# Patient Record
Sex: FEMALE | Race: WHITE | NOT HISPANIC OR LATINO | Employment: OTHER | URBAN - METROPOLITAN AREA
[De-identification: names, ages, dates, MRNs, and addresses within clinical notes are randomized per-mention and may not be internally consistent; named-entity substitution may affect disease eponyms.]

---

## 2023-11-07 ENCOUNTER — APPOINTMENT (EMERGENCY)
Dept: CT IMAGING | Facility: HOSPITAL | Age: 68
End: 2023-11-07
Payer: COMMERCIAL

## 2023-11-07 ENCOUNTER — APPOINTMENT (EMERGENCY)
Dept: RADIOLOGY | Facility: HOSPITAL | Age: 68
End: 2023-11-07
Payer: COMMERCIAL

## 2023-11-07 ENCOUNTER — HOSPITAL ENCOUNTER (EMERGENCY)
Facility: HOSPITAL | Age: 68
Discharge: HOME | End: 2023-11-07
Attending: EMERGENCY MEDICINE
Payer: COMMERCIAL

## 2023-11-07 VITALS
DIASTOLIC BLOOD PRESSURE: 71 MMHG | OXYGEN SATURATION: 93 % | HEIGHT: 63 IN | BODY MASS INDEX: 19.14 KG/M2 | WEIGHT: 108 LBS | SYSTOLIC BLOOD PRESSURE: 119 MMHG | HEART RATE: 77 BPM | RESPIRATION RATE: 20 BRPM | TEMPERATURE: 98 F

## 2023-11-07 DIAGNOSIS — R10.9 ABDOMINAL PAIN: Primary | ICD-10-CM

## 2023-11-07 LAB
ALBUMIN SERPL BCP-MCNC: 3.7 G/DL (ref 3.5–5)
ALP SERPL-CCNC: 85 U/L (ref 34–104)
ALT SERPL W P-5'-P-CCNC: <3 U/L (ref 7–52)
ANION GAP SERPL CALCULATED.3IONS-SCNC: 11 MMOL/L
AST SERPL W P-5'-P-CCNC: 14 U/L (ref 13–39)
BASOPHILS # BLD AUTO: 0.05 THOUSANDS/ÂΜL (ref 0–0.1)
BASOPHILS NFR BLD AUTO: 1 % (ref 0–1)
BILIRUB SERPL-MCNC: 0.72 MG/DL (ref 0.2–1)
BUN SERPL-MCNC: 8 MG/DL (ref 5–25)
CALCIUM SERPL-MCNC: 9.7 MG/DL (ref 8.4–10.2)
CHLORIDE SERPL-SCNC: 101 MMOL/L (ref 96–108)
CO2 SERPL-SCNC: 26 MMOL/L (ref 21–32)
CREAT SERPL-MCNC: 0.64 MG/DL (ref 0.6–1.3)
EOSINOPHIL # BLD AUTO: 0.33 THOUSAND/ÂΜL (ref 0–0.61)
EOSINOPHIL NFR BLD AUTO: 4 % (ref 0–6)
ERYTHROCYTE [DISTWIDTH] IN BLOOD BY AUTOMATED COUNT: 13.2 % (ref 11.6–15.1)
GFR SERPL CREATININE-BSD FRML MDRD: 91 ML/MIN/1.73SQ M
GLUCOSE SERPL-MCNC: 92 MG/DL (ref 65–140)
HCT VFR BLD AUTO: 42.2 % (ref 34.8–46.1)
HGB BLD-MCNC: 13 G/DL (ref 11.5–15.4)
IMM GRANULOCYTES # BLD AUTO: 0.03 THOUSAND/UL (ref 0–0.2)
IMM GRANULOCYTES NFR BLD AUTO: 0 % (ref 0–2)
LIPASE SERPL-CCNC: 7 U/L (ref 11–82)
LYMPHOCYTES # BLD AUTO: 1.23 THOUSANDS/ÂΜL (ref 0.6–4.47)
LYMPHOCYTES NFR BLD AUTO: 14 % (ref 14–44)
MCH RBC QN AUTO: 29.7 PG (ref 26.8–34.3)
MCHC RBC AUTO-ENTMCNC: 30.8 G/DL (ref 31.4–37.4)
MCV RBC AUTO: 96 FL (ref 82–98)
MONOCYTES # BLD AUTO: 0.6 THOUSAND/ÂΜL (ref 0.17–1.22)
MONOCYTES NFR BLD AUTO: 7 % (ref 4–12)
NEUTROPHILS # BLD AUTO: 6.83 THOUSANDS/ÂΜL (ref 1.85–7.62)
NEUTS SEG NFR BLD AUTO: 74 % (ref 43–75)
NRBC BLD AUTO-RTO: 0 /100 WBCS
PLATELET # BLD AUTO: 322 THOUSANDS/UL (ref 149–390)
PMV BLD AUTO: 11.1 FL (ref 8.9–12.7)
POTASSIUM SERPL-SCNC: 4.7 MMOL/L (ref 3.5–5.3)
PROT SERPL-MCNC: 7.8 G/DL (ref 6.4–8.4)
RBC # BLD AUTO: 4.38 MILLION/UL (ref 3.81–5.12)
SODIUM SERPL-SCNC: 138 MMOL/L (ref 135–147)
WBC # BLD AUTO: 9.07 THOUSAND/UL (ref 4.31–10.16)

## 2023-11-07 PROCEDURE — 71046 X-RAY EXAM CHEST 2 VIEWS: CPT

## 2023-11-07 PROCEDURE — 99284 EMERGENCY DEPT VISIT MOD MDM: CPT

## 2023-11-07 PROCEDURE — 96374 THER/PROPH/DIAG INJ IV PUSH: CPT

## 2023-11-07 PROCEDURE — 93005 ELECTROCARDIOGRAM TRACING: CPT

## 2023-11-07 PROCEDURE — 74177 CT ABD & PELVIS W/CONTRAST: CPT

## 2023-11-07 PROCEDURE — G1004 CDSM NDSC: HCPCS

## 2023-11-07 PROCEDURE — 83690 ASSAY OF LIPASE: CPT | Performed by: EMERGENCY MEDICINE

## 2023-11-07 PROCEDURE — 36415 COLL VENOUS BLD VENIPUNCTURE: CPT | Performed by: EMERGENCY MEDICINE

## 2023-11-07 PROCEDURE — 99285 EMERGENCY DEPT VISIT HI MDM: CPT | Performed by: EMERGENCY MEDICINE

## 2023-11-07 PROCEDURE — 85025 COMPLETE CBC W/AUTO DIFF WBC: CPT | Performed by: EMERGENCY MEDICINE

## 2023-11-07 PROCEDURE — 80053 COMPREHEN METABOLIC PANEL: CPT | Performed by: EMERGENCY MEDICINE

## 2023-11-07 RX ORDER — ONDANSETRON 2 MG/ML
4 INJECTION INTRAMUSCULAR; INTRAVENOUS ONCE
Status: COMPLETED | OUTPATIENT
Start: 2023-11-07 | End: 2023-11-07

## 2023-11-07 RX ORDER — ONDANSETRON 4 MG/1
4 TABLET, ORALLY DISINTEGRATING ORAL EVERY 6 HOURS PRN
Qty: 20 TABLET | Refills: 0 | Status: ON HOLD | OUTPATIENT
Start: 2023-11-07

## 2023-11-07 RX ORDER — DICYCLOMINE HCL 20 MG
20 TABLET ORAL ONCE
Status: COMPLETED | OUTPATIENT
Start: 2023-11-07 | End: 2023-11-07

## 2023-11-07 RX ORDER — ALBUTEROL SULFATE 90 UG/1
2 AEROSOL, METERED RESPIRATORY (INHALATION) ONCE
Status: COMPLETED | OUTPATIENT
Start: 2023-11-07 | End: 2023-11-07

## 2023-11-07 RX ORDER — DICYCLOMINE HCL 20 MG
20 TABLET ORAL 2 TIMES DAILY
Qty: 20 TABLET | Refills: 0 | Status: ON HOLD | OUTPATIENT
Start: 2023-11-07

## 2023-11-07 RX ADMIN — ONDANSETRON 4 MG: 2 INJECTION INTRAMUSCULAR; INTRAVENOUS at 21:13

## 2023-11-07 RX ADMIN — ALBUTEROL SULFATE 2 PUFF: 90 AEROSOL, METERED RESPIRATORY (INHALATION) at 23:08

## 2023-11-07 RX ADMIN — IOHEXOL 100 ML: 350 INJECTION, SOLUTION INTRAVENOUS at 22:20

## 2023-11-07 RX ADMIN — DICYCLOMINE HYDROCHLORIDE 20 MG: 20 TABLET ORAL at 20:11

## 2023-11-08 LAB
ATRIAL RATE: 94 BPM
P AXIS: 75 DEGREES
PR INTERVAL: 134 MS
QRS AXIS: 82 DEGREES
QRSD INTERVAL: 66 MS
QT INTERVAL: 360 MS
QTC INTERVAL: 450 MS
T WAVE AXIS: 52 DEGREES
VENTRICULAR RATE: 94 BPM

## 2023-11-08 PROCEDURE — 93010 ELECTROCARDIOGRAM REPORT: CPT | Performed by: INTERNAL MEDICINE

## 2023-11-08 NOTE — RESULT ENCOUNTER NOTE
1st Attempt to contact patient regarding CXR results. Mobile number was disconnected. No answer on home number. Left generic message to return call to ED to discuss results.  H: [phone]

## 2023-11-08 NOTE — ED PROVIDER NOTES
History  Chief Complaint   Patient presents with   • Abdominal Pain     Generalized abdominal pain with continued nausea and vomiting for 4 weeks. Patient reports  continuous diarrhea and being unable to keep any food down and only being able to drink water. Patient denies any shortness of breath or chest pain. Patient is 17-year-old female past medical history of rheumatoid arthritis presenting with abdominal pain. Patient notes constant generalized abdominal pain for the last month associated with nausea but no vomiting. Notes diarrhea 2 episodes today, nonbloody and states that the pain is mildly better with bowel movements. Has not discussed with patient be, and has not taken medication for it. States that she cannot tolerate p.o. intake, only water. Denies any chest pain, shortness of breath, dizziness, urinary symptoms, fevers, rashes, vision changes. None       No past medical history on file. No past surgical history on file. No family history on file. I have reviewed and agree with the history as documented. E-Cigarette/Vaping   • E-Cigarette Use Never User      E-Cigarette/Vaping Substances     Social History     Tobacco Use   • Smoking status: Every Day     Packs/day: 0.25     Types: Cigarettes   • Smokeless tobacco: Never   Vaping Use   • Vaping Use: Never used   Substance Use Topics   • Alcohol use: Never   • Drug use: Never       Review of Systems   All other systems reviewed and are negative. Physical Exam  Physical Exam  Vitals reviewed. Constitutional:       General: She is not in acute distress. Appearance: Normal appearance. She is not ill-appearing. HENT:      Mouth/Throat:      Mouth: Mucous membranes are moist.   Eyes:      Conjunctiva/sclera: Conjunctivae normal.      Comments: Normal conjunctiva   Cardiovascular:      Rate and Rhythm: Normal rate and regular rhythm. Heart sounds: Normal heart sounds.       Comments: Equal bilateral radial pulses  Pulmonary:      Effort: Pulmonary effort is normal.      Comments: , No signs of respiratory distress, excess or muscle use, retractions or conversational dyspnea, small expiratory wheezing at the bases and apices bilaterally  Abdominal:      General: Abdomen is flat. Palpations: Abdomen is soft. Tenderness: There is generalized abdominal tenderness. There is guarding. There is no right CVA tenderness or left CVA tenderness. Comments: Generalized tenderness with voluntary guarding in all quadrants   Musculoskeletal:         General: No swelling. Normal range of motion. Cervical back: Neck supple. Skin:     General: Skin is warm and dry. Neurological:      General: No focal deficit present. Mental Status: She is alert.    Psychiatric:         Mood and Affect: Mood normal.         Vital Signs  ED Triage Vitals [11/07/23 1823]   Temperature Pulse Respirations Blood Pressure SpO2   98 °F (36.7 °C) 98 18 117/64 95 %      Temp Source Heart Rate Source Patient Position - Orthostatic VS BP Location FiO2 (%)   Tympanic Monitor Sitting Left arm --      Pain Score       --           Vitals:    11/07/23 1823   BP: 117/64   Pulse: 98   Patient Position - Orthostatic VS: Sitting         Visual Acuity      ED Medications  Medications   ondansetron (ZOFRAN) injection 4 mg (has no administration in time range)   dicyclomine (BENTYL) tablet 20 mg (has no administration in time range)   sodium chloride 0.9 % bolus 1,000 mL (has no administration in time range)       Diagnostic Studies  Results Reviewed       None                   No orders to display              Procedures  ECG 12 Lead Documentation Only    Date/Time: 11/7/2023 8:39 PM    Performed by: Sarah Do DO  Authorized by: Sarah Do DO    Patient location:  ED  Previous ECG:     Previous ECG:  Unavailable  Interpretation:     Interpretation: non-specific    Rate:     ECG rate assessment: normal    Rhythm:     Rhythm: sinus rhythm    Ectopy:     Ectopy: none    QRS:     QRS axis:  Normal    QRS intervals:  Normal  Conduction:     Conduction: normal    ST segments:     ST segments:  Normal  T waves:     T waves: non-specific             ED Course  ED Course as of 11/07/23 5228   Tue Nov 07, 2023   9188 Patient notes improvement of symptoms, still denying shortness of breath, trace left pleural effusion and wheezing on exam previously, will send with inhaler, have discussed return precautions and outpatient PCP follow-up and patient states she understands. Have also discussed incidental findings and patient states she understands. Medical Decision Making  Patient is 70-year-old female past medical history of rheumatoid arthritis presenting with abdominal pain. Patient is well-appearing at bedside with stable vitals and in no acute distress. She does have scant expiratory wheezing on exam but no signs respiratory distress and denies any shortness of breath. Obtain chest x-ray to rule out pneumonia. Patient does have generalized abdominal tenderness with voluntary guarding will obtain labs to assess for electrolyte abnormalities, anemia, pancreatitis, EKG to assess for signs of ACS, CT abdomen pelvis to rule out small bowel obstruction, diverticulitis, appendicitis, cholecystitis or other intra-abdominal pathology, give symptomatic management reassess. Amount and/or Complexity of Data Reviewed  Labs: ordered. Radiology: ordered and independent interpretation performed. Risk  Prescription drug management. Disposition  Final diagnoses:   None     ED Disposition       None          Follow-up Information    None         Patient's Medications    No medications on file       No discharge procedures on file.     PDMP Review       None            ED Provider  Electronically Signed by             Ac Bedolla DO  11/07/23 1258

## 2023-11-11 ENCOUNTER — APPOINTMENT (EMERGENCY)
Dept: RADIOLOGY | Facility: HOSPITAL | Age: 68
DRG: 393 | End: 2023-11-11
Payer: COMMERCIAL

## 2023-11-11 ENCOUNTER — HOSPITAL ENCOUNTER (INPATIENT)
Facility: HOSPITAL | Age: 68
LOS: 2 days | Discharge: HOME HEALTH | DRG: 393 | End: 2023-11-14
Attending: EMERGENCY MEDICINE | Admitting: INTERNAL MEDICINE
Payer: COMMERCIAL

## 2023-11-11 DIAGNOSIS — R09.89 PULMONARY VASCULAR CONGESTION: ICD-10-CM

## 2023-11-11 DIAGNOSIS — E27.8 ADRENAL NODULE (HCC): ICD-10-CM

## 2023-11-11 DIAGNOSIS — K59.01 SLOW TRANSIT CONSTIPATION: ICD-10-CM

## 2023-11-11 DIAGNOSIS — E44.0 MODERATE PROTEIN-CALORIE MALNUTRITION (HCC): ICD-10-CM

## 2023-11-11 DIAGNOSIS — R10.30 LOWER ABDOMINAL PAIN OF UNKNOWN ETIOLOGY: ICD-10-CM

## 2023-11-11 DIAGNOSIS — M06.9 RHEUMATOID ARTHRITIS (HCC): ICD-10-CM

## 2023-11-11 DIAGNOSIS — D86.0 PULMONARY SARCOIDOSIS (HCC): ICD-10-CM

## 2023-11-11 DIAGNOSIS — J18.9 BILATERAL PNEUMONIA: Primary | ICD-10-CM

## 2023-11-11 DIAGNOSIS — J90 BILATERAL PLEURAL EFFUSION: ICD-10-CM

## 2023-11-11 DIAGNOSIS — R09.02 HYPOXIA: ICD-10-CM

## 2023-11-11 LAB
ANION GAP SERPL CALCULATED.3IONS-SCNC: 8 MMOL/L
APTT PPP: 25 SECONDS (ref 23–37)
BASOPHILS # BLD AUTO: 0.03 THOUSANDS/ÂΜL (ref 0–0.1)
BASOPHILS NFR BLD AUTO: 0 % (ref 0–1)
BNP SERPL-MCNC: 47 PG/ML (ref 0–100)
BUN SERPL-MCNC: 13 MG/DL (ref 5–25)
CALCIUM SERPL-MCNC: 9.6 MG/DL (ref 8.4–10.2)
CARDIAC TROPONIN I PNL SERPL HS: 3 NG/L
CHLORIDE SERPL-SCNC: 100 MMOL/L (ref 96–108)
CO2 SERPL-SCNC: 28 MMOL/L (ref 21–32)
CREAT SERPL-MCNC: 0.62 MG/DL (ref 0.6–1.3)
EOSINOPHIL # BLD AUTO: 0.25 THOUSAND/ÂΜL (ref 0–0.61)
EOSINOPHIL NFR BLD AUTO: 2 % (ref 0–6)
ERYTHROCYTE [DISTWIDTH] IN BLOOD BY AUTOMATED COUNT: 13.5 % (ref 11.6–15.1)
FLUAV RNA RESP QL NAA+PROBE: NEGATIVE
FLUBV RNA RESP QL NAA+PROBE: NEGATIVE
GFR SERPL CREATININE-BSD FRML MDRD: 92 ML/MIN/1.73SQ M
GLUCOSE SERPL-MCNC: 165 MG/DL (ref 65–140)
HCT VFR BLD AUTO: 42.1 % (ref 34.8–46.1)
HGB BLD-MCNC: 13.1 G/DL (ref 11.5–15.4)
IMM GRANULOCYTES # BLD AUTO: 0.04 THOUSAND/UL (ref 0–0.2)
IMM GRANULOCYTES NFR BLD AUTO: 0 % (ref 0–2)
INR PPP: 1.16 (ref 0.84–1.19)
LACTATE SERPL-SCNC: 1.3 MMOL/L (ref 0.5–2)
LIPASE SERPL-CCNC: 7 U/L (ref 11–82)
LYMPHOCYTES # BLD AUTO: 0.72 THOUSANDS/ÂΜL (ref 0.6–4.47)
LYMPHOCYTES NFR BLD AUTO: 6 % (ref 14–44)
MAGNESIUM SERPL-MCNC: 1.9 MG/DL (ref 1.9–2.7)
MCH RBC QN AUTO: 29.8 PG (ref 26.8–34.3)
MCHC RBC AUTO-ENTMCNC: 31.1 G/DL (ref 31.4–37.4)
MCV RBC AUTO: 96 FL (ref 82–98)
MONOCYTES # BLD AUTO: 0.78 THOUSAND/ÂΜL (ref 0.17–1.22)
MONOCYTES NFR BLD AUTO: 7 % (ref 4–12)
NEUTROPHILS # BLD AUTO: 9.88 THOUSANDS/ÂΜL (ref 1.85–7.62)
NEUTS SEG NFR BLD AUTO: 85 % (ref 43–75)
NRBC BLD AUTO-RTO: 0 /100 WBCS
PLATELET # BLD AUTO: 288 THOUSANDS/UL (ref 149–390)
PMV BLD AUTO: 11.4 FL (ref 8.9–12.7)
POTASSIUM SERPL-SCNC: 4.1 MMOL/L (ref 3.5–5.3)
PROCALCITONIN SERPL-MCNC: 0.1 NG/ML
PROTHROMBIN TIME: 15.5 SECONDS (ref 11.6–14.5)
RBC # BLD AUTO: 4.39 MILLION/UL (ref 3.81–5.12)
RSV RNA RESP QL NAA+PROBE: NEGATIVE
SARS-COV-2 RNA RESP QL NAA+PROBE: NEGATIVE
SODIUM SERPL-SCNC: 136 MMOL/L (ref 135–147)
WBC # BLD AUTO: 11.7 THOUSAND/UL (ref 4.31–10.16)

## 2023-11-11 PROCEDURE — 96361 HYDRATE IV INFUSION ADD-ON: CPT

## 2023-11-11 PROCEDURE — 80048 BASIC METABOLIC PNL TOTAL CA: CPT | Performed by: EMERGENCY MEDICINE

## 2023-11-11 PROCEDURE — 94640 AIRWAY INHALATION TREATMENT: CPT

## 2023-11-11 PROCEDURE — 71045 X-RAY EXAM CHEST 1 VIEW: CPT

## 2023-11-11 PROCEDURE — 0241U HB NFCT DS VIR RESP RNA 4 TRGT: CPT | Performed by: EMERGENCY MEDICINE

## 2023-11-11 PROCEDURE — 84484 ASSAY OF TROPONIN QUANT: CPT | Performed by: EMERGENCY MEDICINE

## 2023-11-11 PROCEDURE — 83735 ASSAY OF MAGNESIUM: CPT | Performed by: EMERGENCY MEDICINE

## 2023-11-11 PROCEDURE — 80076 HEPATIC FUNCTION PANEL: CPT | Performed by: EMERGENCY MEDICINE

## 2023-11-11 PROCEDURE — 83605 ASSAY OF LACTIC ACID: CPT | Performed by: EMERGENCY MEDICINE

## 2023-11-11 PROCEDURE — 36415 COLL VENOUS BLD VENIPUNCTURE: CPT | Performed by: EMERGENCY MEDICINE

## 2023-11-11 PROCEDURE — 83690 ASSAY OF LIPASE: CPT | Performed by: EMERGENCY MEDICINE

## 2023-11-11 PROCEDURE — 85025 COMPLETE CBC W/AUTO DIFF WBC: CPT | Performed by: EMERGENCY MEDICINE

## 2023-11-11 PROCEDURE — 87040 BLOOD CULTURE FOR BACTERIA: CPT | Performed by: EMERGENCY MEDICINE

## 2023-11-11 PROCEDURE — 99285 EMERGENCY DEPT VISIT HI MDM: CPT

## 2023-11-11 PROCEDURE — 84145 PROCALCITONIN (PCT): CPT | Performed by: EMERGENCY MEDICINE

## 2023-11-11 PROCEDURE — 93005 ELECTROCARDIOGRAM TRACING: CPT

## 2023-11-11 PROCEDURE — 96365 THER/PROPH/DIAG IV INF INIT: CPT

## 2023-11-11 PROCEDURE — 83880 ASSAY OF NATRIURETIC PEPTIDE: CPT | Performed by: EMERGENCY MEDICINE

## 2023-11-11 PROCEDURE — 96375 TX/PRO/DX INJ NEW DRUG ADDON: CPT

## 2023-11-11 PROCEDURE — 85610 PROTHROMBIN TIME: CPT | Performed by: EMERGENCY MEDICINE

## 2023-11-11 PROCEDURE — 84443 ASSAY THYROID STIM HORMONE: CPT | Performed by: INTERNAL MEDICINE

## 2023-11-11 PROCEDURE — 85730 THROMBOPLASTIN TIME PARTIAL: CPT | Performed by: EMERGENCY MEDICINE

## 2023-11-11 PROCEDURE — 99285 EMERGENCY DEPT VISIT HI MDM: CPT | Performed by: EMERGENCY MEDICINE

## 2023-11-11 RX ORDER — DICYCLOMINE HCL 20 MG
20 TABLET ORAL ONCE
Status: COMPLETED | OUTPATIENT
Start: 2023-11-11 | End: 2023-11-11

## 2023-11-11 RX ORDER — ONDANSETRON 2 MG/ML
4 INJECTION INTRAMUSCULAR; INTRAVENOUS ONCE
Status: COMPLETED | OUTPATIENT
Start: 2023-11-11 | End: 2023-11-11

## 2023-11-11 RX ORDER — CEFTRIAXONE 2 G/50ML
2000 INJECTION, SOLUTION INTRAVENOUS ONCE
Status: COMPLETED | OUTPATIENT
Start: 2023-11-11 | End: 2023-11-11

## 2023-11-11 RX ADMIN — CEFTRIAXONE 2000 MG: 2 INJECTION, SOLUTION INTRAVENOUS at 23:15

## 2023-11-11 RX ADMIN — SODIUM CHLORIDE 1000 ML: 0.9 INJECTION, SOLUTION INTRAVENOUS at 22:31

## 2023-11-11 RX ADMIN — IPRATROPIUM BROMIDE 0.5 MG: 0.5 SOLUTION RESPIRATORY (INHALATION) at 21:52

## 2023-11-11 RX ADMIN — ALBUTEROL SULFATE 5 MG: 2.5 SOLUTION RESPIRATORY (INHALATION) at 21:52

## 2023-11-11 RX ADMIN — DICYCLOMINE HYDROCHLORIDE 20 MG: 20 TABLET ORAL at 22:38

## 2023-11-11 RX ADMIN — ONDANSETRON 4 MG: 2 INJECTION INTRAMUSCULAR; INTRAVENOUS at 22:38

## 2023-11-12 ENCOUNTER — APPOINTMENT (EMERGENCY)
Dept: CT IMAGING | Facility: HOSPITAL | Age: 68
DRG: 393 | End: 2023-11-12
Payer: COMMERCIAL

## 2023-11-12 PROBLEM — D72.829 LEUKOCYTOSIS: Status: ACTIVE | Noted: 2023-11-12

## 2023-11-12 PROBLEM — R06.89 RESPIRATORY INSUFFICIENCY: Status: ACTIVE | Noted: 2023-11-12

## 2023-11-12 PROBLEM — R10.9 ABDOMINAL PAIN: Status: ACTIVE | Noted: 2023-11-12

## 2023-11-12 PROBLEM — R93.89 ABNORMAL CT SCAN: Status: ACTIVE | Noted: 2023-11-12

## 2023-11-12 PROBLEM — E27.8 ADRENAL NODULE (HCC): Status: ACTIVE | Noted: 2023-11-12

## 2023-11-12 PROBLEM — M06.9 RHEUMATOID ARTHRITIS (HCC): Status: ACTIVE | Noted: 2023-11-12

## 2023-11-12 LAB
ALBUMIN SERPL BCP-MCNC: 3.2 G/DL (ref 3.5–5)
ALP SERPL-CCNC: 74 U/L (ref 34–104)
ALT SERPL W P-5'-P-CCNC: 3 U/L (ref 7–52)
AST SERPL W P-5'-P-CCNC: 11 U/L (ref 13–39)
ATRIAL RATE: 106 BPM
BACTERIA UR QL AUTO: NORMAL /HPF
BILIRUB DIRECT SERPL-MCNC: 0.16 MG/DL (ref 0–0.2)
BILIRUB SERPL-MCNC: 0.52 MG/DL (ref 0.2–1)
BILIRUB UR QL STRIP: NEGATIVE
CLARITY UR: CLEAR
COLOR UR: YELLOW
GLUCOSE UR STRIP-MCNC: NEGATIVE MG/DL
HGB UR QL STRIP.AUTO: NEGATIVE
KETONES UR STRIP-MCNC: ABNORMAL MG/DL
LEUKOCYTE ESTERASE UR QL STRIP: NEGATIVE
NITRITE UR QL STRIP: NEGATIVE
NON-SQ EPI CELLS URNS QL MICRO: NORMAL /HPF
P AXIS: 66 DEGREES
PH UR STRIP.AUTO: 6.5 [PH]
PR INTERVAL: 140 MS
PROT SERPL-MCNC: 7 G/DL (ref 6.4–8.4)
PROT UR STRIP-MCNC: ABNORMAL MG/DL
QRS AXIS: 64 DEGREES
QRSD INTERVAL: 68 MS
QT INTERVAL: 354 MS
QTC INTERVAL: 470 MS
RBC #/AREA URNS AUTO: NORMAL /HPF
SP GR UR STRIP.AUTO: <=1.005 (ref 1–1.03)
T WAVE AXIS: 6 DEGREES
TSH SERPL DL<=0.05 MIU/L-ACNC: 3.73 UIU/ML (ref 0.45–4.5)
UROBILINOGEN UR QL STRIP.AUTO: 0.2 E.U./DL
VENTRICULAR RATE: 106 BPM
WBC #/AREA URNS AUTO: NORMAL /HPF

## 2023-11-12 PROCEDURE — 96376 TX/PRO/DX INJ SAME DRUG ADON: CPT

## 2023-11-12 PROCEDURE — 93010 ELECTROCARDIOGRAM REPORT: CPT | Performed by: INTERNAL MEDICINE

## 2023-11-12 PROCEDURE — 81001 URINALYSIS AUTO W/SCOPE: CPT | Performed by: EMERGENCY MEDICINE

## 2023-11-12 PROCEDURE — 74177 CT ABD & PELVIS W/CONTRAST: CPT

## 2023-11-12 PROCEDURE — G1004 CDSM NDSC: HCPCS

## 2023-11-12 PROCEDURE — 99222 1ST HOSP IP/OBS MODERATE 55: CPT | Performed by: INTERNAL MEDICINE

## 2023-11-12 PROCEDURE — 87086 URINE CULTURE/COLONY COUNT: CPT | Performed by: EMERGENCY MEDICINE

## 2023-11-12 PROCEDURE — 71260 CT THORAX DX C+: CPT

## 2023-11-12 RX ORDER — ACETAMINOPHEN 325 MG/1
650 TABLET ORAL EVERY 6 HOURS PRN
Status: DISCONTINUED | OUTPATIENT
Start: 2023-11-12 | End: 2023-11-14 | Stop reason: HOSPADM

## 2023-11-12 RX ORDER — SENNOSIDES 8.6 MG
1 TABLET ORAL DAILY
Status: DISCONTINUED | OUTPATIENT
Start: 2023-11-12 | End: 2023-11-14 | Stop reason: HOSPADM

## 2023-11-12 RX ORDER — FUROSEMIDE 10 MG/ML
40 INJECTION INTRAMUSCULAR; INTRAVENOUS DAILY
Status: DISCONTINUED | OUTPATIENT
Start: 2023-11-12 | End: 2023-11-13

## 2023-11-12 RX ORDER — POLYETHYLENE GLYCOL 3350 17 G/17G
17 POWDER, FOR SOLUTION ORAL
Status: DISCONTINUED | OUTPATIENT
Start: 2023-11-12 | End: 2023-11-14 | Stop reason: HOSPADM

## 2023-11-12 RX ORDER — HEPARIN SODIUM 5000 [USP'U]/ML
5000 INJECTION, SOLUTION INTRAVENOUS; SUBCUTANEOUS EVERY 8 HOURS SCHEDULED
Status: DISCONTINUED | OUTPATIENT
Start: 2023-11-12 | End: 2023-11-14 | Stop reason: HOSPADM

## 2023-11-12 RX ORDER — PREDNISONE 5 MG/1
5 TABLET ORAL 2 TIMES DAILY WITH MEALS
Status: DISCONTINUED | OUTPATIENT
Start: 2023-11-12 | End: 2023-11-14 | Stop reason: HOSPADM

## 2023-11-12 RX ORDER — ONDANSETRON 2 MG/ML
INJECTION INTRAMUSCULAR; INTRAVENOUS
Status: DISPENSED
Start: 2023-11-12 | End: 2023-11-12

## 2023-11-12 RX ORDER — DICYCLOMINE HYDROCHLORIDE 10 MG/1
10 CAPSULE ORAL
Status: DISCONTINUED | OUTPATIENT
Start: 2023-11-12 | End: 2023-11-14 | Stop reason: HOSPADM

## 2023-11-12 RX ORDER — NYSTATIN 100000 [USP'U]/G
POWDER TOPICAL 2 TIMES DAILY
Status: DISCONTINUED | OUTPATIENT
Start: 2023-11-12 | End: 2023-11-12

## 2023-11-12 RX ORDER — PREDNISONE 5 MG/1
5 TABLET ORAL 2 TIMES DAILY WITH MEALS
Status: ON HOLD | COMMUNITY

## 2023-11-12 RX ORDER — ONDANSETRON 2 MG/ML
4 INJECTION INTRAMUSCULAR; INTRAVENOUS EVERY 6 HOURS PRN
Status: DISCONTINUED | OUTPATIENT
Start: 2023-11-12 | End: 2023-11-14 | Stop reason: HOSPADM

## 2023-11-12 RX ORDER — DOCUSATE SODIUM 100 MG/1
100 CAPSULE, LIQUID FILLED ORAL 2 TIMES DAILY
Status: DISCONTINUED | OUTPATIENT
Start: 2023-11-12 | End: 2023-11-14 | Stop reason: HOSPADM

## 2023-11-12 RX ORDER — PREGABALIN 75 MG/1
150 CAPSULE ORAL 2 TIMES DAILY
Status: DISCONTINUED | OUTPATIENT
Start: 2023-11-12 | End: 2023-11-13

## 2023-11-12 RX ORDER — NYSTATIN 100000 [USP'U]/G
POWDER TOPICAL 2 TIMES DAILY
Status: DISCONTINUED | OUTPATIENT
Start: 2023-11-12 | End: 2023-11-14 | Stop reason: HOSPADM

## 2023-11-12 RX ORDER — ONDANSETRON 2 MG/ML
4 INJECTION INTRAMUSCULAR; INTRAVENOUS ONCE
Status: COMPLETED | OUTPATIENT
Start: 2023-11-12 | End: 2023-11-12

## 2023-11-12 RX ORDER — PREGABALIN 75 MG/1
150 CAPSULE ORAL 2 TIMES DAILY
Status: ON HOLD | COMMUNITY

## 2023-11-12 RX ADMIN — PREGABALIN 150 MG: 75 CAPSULE ORAL at 09:37

## 2023-11-12 RX ADMIN — IOHEXOL 100 ML: 350 INJECTION, SOLUTION INTRAVENOUS at 00:39

## 2023-11-12 RX ADMIN — HEPARIN SODIUM 5000 UNITS: 5000 INJECTION INTRAVENOUS; SUBCUTANEOUS at 17:52

## 2023-11-12 RX ADMIN — DICYCLOMINE HYDROCHLORIDE 10 MG: 10 CAPSULE ORAL at 12:19

## 2023-11-12 RX ADMIN — PREDNISONE 5 MG: 5 TABLET ORAL at 07:32

## 2023-11-12 RX ADMIN — DOCUSATE SODIUM 100 MG: 100 CAPSULE, LIQUID FILLED ORAL at 17:52

## 2023-11-12 RX ADMIN — DICYCLOMINE HYDROCHLORIDE 10 MG: 10 CAPSULE ORAL at 17:52

## 2023-11-12 RX ADMIN — DOCUSATE SODIUM 100 MG: 100 CAPSULE, LIQUID FILLED ORAL at 09:37

## 2023-11-12 RX ADMIN — POLYETHYLENE GLYCOL 3350 17 G: 17 POWDER, FOR SOLUTION ORAL at 17:52

## 2023-11-12 RX ADMIN — PREGABALIN 150 MG: 75 CAPSULE ORAL at 17:52

## 2023-11-12 RX ADMIN — NYSTATIN: 100000 POWDER TOPICAL at 07:33

## 2023-11-12 RX ADMIN — DICYCLOMINE HYDROCHLORIDE 10 MG: 10 CAPSULE ORAL at 07:32

## 2023-11-12 RX ADMIN — ONDANSETRON 4 MG: 2 INJECTION INTRAMUSCULAR; INTRAVENOUS at 01:08

## 2023-11-12 RX ADMIN — HEPARIN SODIUM 5000 UNITS: 5000 INJECTION INTRAVENOUS; SUBCUTANEOUS at 07:32

## 2023-11-12 RX ADMIN — PREDNISONE 5 MG: 5 TABLET ORAL at 17:52

## 2023-11-12 RX ADMIN — SENNOSIDES 8.6 MG: 8.6 TABLET, FILM COATED ORAL at 09:37

## 2023-11-12 RX ADMIN — NYSTATIN 1 APPLICATION: 100000 POWDER TOPICAL at 17:53

## 2023-11-12 RX ADMIN — HEPARIN SODIUM 5000 UNITS: 5000 INJECTION INTRAVENOUS; SUBCUTANEOUS at 23:30

## 2023-11-12 NOTE — PLAN OF CARE
Problem: MOBILITY - ADULT  Goal: Maintain or return to baseline ADL function  Description: INTERVENTIONS:  -  Assess patient's ability to carry out ADLs; assess patient's baseline for ADL function and identify physical deficits which impact ability to perform ADLs (bathing, care of mouth/teeth, toileting, grooming, dressing, etc.)  - Assess/evaluate cause of self-care deficits   - Assess range of motion  - Assess patient's mobility; develop plan if impaired  - Assess patient's need for assistive devices and provide as appropriate  - Encourage maximum independence but intervene and supervise when necessary  - Involve family in performance of ADLs  - Assess for home care needs following discharge   - Consider OT consult to assist with ADL evaluation and planning for discharge  - Provide patient education as appropriate  Outcome: Progressing     Problem: Prexisting or High Potential for Compromised Skin Integrity  Goal: Skin integrity is maintained or improved  Description: INTERVENTIONS:  - Identify patients at risk for skin breakdown  - Assess and monitor skin integrity  - Assess and monitor nutrition and hydration status  - Monitor labs   - Assess for incontinence   - Turn and reposition patient  - Assist with mobility/ambulation  - Relieve pressure over bony prominences  - Avoid friction and shearing  - Provide appropriate hygiene as needed including keeping skin clean and dry  - Evaluate need for skin moisturizer/barrier cream  - Collaborate with interdisciplinary team   - Patient/family teaching  - Consider wound care consult   Outcome: Progressing     Problem: PAIN - ADULT  Goal: Verbalizes/displays adequate comfort level or baseline comfort level  Description: Interventions:  - Encourage patient to monitor pain and request assistance  - Assess pain using appropriate pain scale  - Administer analgesics based on type and severity of pain and evaluate response  - Implement non-pharmacological measures as appropriate and evaluate response  - Consider cultural and social influences on pain and pain management  - Notify physician/advanced practitioner if interventions unsuccessful or patient reports new pain  Outcome: Progressing

## 2023-11-12 NOTE — ASSESSMENT & PLAN NOTE
Initial presentation to the ED on 11/7 with x-ray revealing dense opacity suspicious for consolidation versus mass  On this presentation status CT chest revealing: Diffuse reticular changes in the lungs; this finding in conjunction with small bilateral pleural effusions is consistent with pulmonary venous congestion. Superimposed nodular and groundglass opacities predominantly in the upper lobes noted, suspicious   for superimposed infection. No clinical sign of acute infectious process other than minimal leukocytosis which may be steroid-induced. In addition to low procalcitonin  No clinical sign of CHF decompensation, low BNP but given her symptoms she is now status post Lasix trial with partial improvement in breathing - now looks dry  COVID/influenza/RSV ruled out    Patient with underlying history of rheumatoid arthritis, was previously on methotrexate which was discontinued more than a year ago.   Wonder if above findings secondary to methotrexate induced pneumonitis  Continue to attempt to wean oxygen  She will need OP follow up with pulmonology, high-resolution CT scan and PFTs
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
## 2023-11-12 NOTE — ASSESSMENT & PLAN NOTE
Ongoing mesogastric and epigastric abdominal pain on occasion, Unclear etiology to date  Status post CT scan of abdomen pelvis with no acute intra-abdominal pathology  UA is benign.  Lactic acid so low suspicion for ischemic colitis  Patient chronically on steroids but region of pain not consistent with gastritis and she denies any alarm symptoms  Continue on Bentyl  OP follow up with GI seems appropriate

## 2023-11-12 NOTE — ASSESSMENT & PLAN NOTE
Unclear etiology  Status post CT scan of abdomen pelvis with no acute intra-abdominal pathology  UA is benign  Continue on Bentyl  If symptoms persist consider referral to gastroenterology as outpatient

## 2023-11-12 NOTE — UTILIZATION REVIEW
Initial Clinical Review    11/11 Care started in ED    Observation 11/12 @ 0409 and changed to Inpatient on 11/12 @ 1017. Pt requiring continued stay for Abdominal pain, Respiratory insufficiency and Leukocytosis, work up and treat    Admission: Date/Time/Statement:   Admission Orders (From admission, onward)       Ordered        11/12/23 1017  Inpatient Admission  Once            11/12/23 0409  Place in Observation  Once                     Orders Placed This Encounter   Procedures    INPATIENT ADMISSION     Standing Status:   Standing     Number of Occurrences:   1     Order Specific Question:   Level of Care     Answer:   Med Surg [16]     Order Specific Question:   Estimated length of stay     Answer:   More than 2 Midnights     Order Specific Question:   Certification     Answer:   I certify that inpatient services are medically necessary for this patient for a duration of greater than two midnights. See H&P and MD Progress Notes for additional information about the patient's course of treatment. ED Arrival Information       Expected   -    Arrival   11/11/2023 21:32    Acuity   Emergent              Means of arrival   Ambulance    Escorted by   [address]   Hospitalist    Admission type   Emergency              Arrival complaint   ab pain             Chief Complaint   Patient presents with    Abdominal Pain     Pt arrives EMS from home c/o lower abd pain x weeks. C/o associated frequent urination, lower back pain, and some vomiting. Found to be in high 80's on RA, placed on NC. 95% on 2L NC       Initial Presentation: 76 y.o. female to ED presents for abdominal pain ongoing for past 3 wks. Also c/o  intermittent nausea, vomiting and diarrhea, though states her last occurrence of this was approximately 2 days ago. Seen in ED on 11/7 and d/c on course of Bentyl. She was also seen at [facility] on 9/23  with very similar complaints for the same duration.  States she currently resides at a Group home. Per EMS, pt's O2 sat noted to be in the high 80s. , placed on O2 2L NC. Now off O2. Pt notes mild shortness of breath. PMH for Rheumatoid arthritis. Admit Inpatient level of care for Abdominal pain, Respiratory insufficiency and Leukocytosis. Continue Bentyl. Incidental finding status post CT imaging. Adrenal nodule. Also noted renal cyst     11/12 Changed to Inpatient status        ED Triage Vitals   Temperature Pulse Respirations Blood Pressure SpO2   11/11/23 2143 11/11/23 2135 11/11/23 2135 11/11/23 2135 11/11/23 2135   98.7 °F (37.1 °C) (!) 106 16 99/72 93 %      Temp Source Heart Rate Source Patient Position - Orthostatic VS BP Location FiO2 (%)   11/11/23 2143 11/11/23 2135 11/11/23 2300 11/11/23 2135 --   Oral Monitor Lying Right arm       Pain Score       11/12/23 0310       4          Wt Readings from Last 1 Encounters:   11/12/23 44.6 kg (98 lb 5.2 oz)     Additional Vital Signs:   11/12/23 08:30:17 -- 83 -- -- -- 89 % Abnormal  -- -- -- --   11/12/23 07:52:29 97.8 °F (36.6 °C) -- 19 93/57 69 -- -- -- -- --   11/12/23 0439 -- 89 -- -- -- 90 % -- -- None (Room air) --   11/12/23 03:10:14 -- 85 18 99/65 76 97 % -- -- -- Lying   11/12/23 0200 -- 86 23 Abnormal  100/65 78 97 % 28 2 L/min Nasal cannula Lying   11/12/23 0100 -- 87 26 Abnormal  103/63 77 98 % 28 2 L/min Nasal cannula Lying   11/11/23 2300 -- 100 31 Abnormal  96/57 72 96 % 28 2 L/min Nasal cannula Lying   11/11/23 2143 98.7 °F (37.1 °C) -- -- -- -- -- -- -- -- --     Pertinent Labs/Diagnostic Test Results:   CT chest abdomen pelvis w contrast   Final Result by Jarrett Curling, DO (11/12 0159)      Diffuse reticular changes in the lungs; this finding in conjunction with small bilateral pleural effusions is consistent with pulmonary venous congestion. Superimposed nodular and groundglass opacities predominantly in the upper lobes noted, suspicious    for superimposed infection. 1.4 cm right adrenal nodule again seen. 12-month follow-up CT is recommended to ensure stability. Biochemical evaluation is also suggested to rule out functioning adenoma if not previously performed. This study was marked for "Immediate" notification in EPIC. Workstation performed: [id number]         XR chest 1 view portable   ED Interpretation by Beryl Rodas DO (11/11 2243)   Worsening patchy density in the right midlung and possibly the right lung base. Small right pleural effusion and trace left pleural effusion.         Results from last 7 days   Lab Units 11/11/23 2159   SARS-COV-2  Negative     Results from last 7 days   Lab Units 11/11/23 2215 11/07/23 2012   WBC Thousand/uL 11.70* 9.07   HEMOGLOBIN g/dL 13.1 13.0   HEMATOCRIT % 42.1 42.2   PLATELETS Thousands/uL 288 322   NEUTROS ABS Thousands/µL 9.88* 6.83         Results from last 7 days   Lab Units 11/11/23 2306 11/07/23 2012   SODIUM mmol/L 136 138   POTASSIUM mmol/L 4.1 4.7   CHLORIDE mmol/L 100 101   CO2 mmol/L 28 26   ANION GAP mmol/L 8 11   BUN mg/dL 13 8   CREATININE mg/dL 0.62 0.64   EGFR ml/min/1.73sq m 92 91   CALCIUM mg/dL 9.6 9.7   MAGNESIUM mg/dL 1.9  --      Results from last 7 days   Lab Units 11/11/23 2306 11/07/23 2012   AST U/L 11* 14   ALT U/L 3* <3*   ALK PHOS U/L 74 85   TOTAL PROTEIN g/dL 7.0 7.8   ALBUMIN g/dL 3.2* 3.7   TOTAL BILIRUBIN mg/dL 0.52 0.72   BILIRUBIN DIRECT mg/dL 0.16  --          Results from last 7 days   Lab Units 11/11/23 2306 11/07/23 2012   GLUCOSE RANDOM mg/dL 165* 92           Results from last 7 days   Lab Units 11/11/23 2215   HS TNI 0HR ng/L 3         Results from last 7 days   Lab Units 11/11/23 2306   PROTIME seconds 15.5*   INR  1.16   PTT seconds 25     Results from last 7 days   Lab Units 11/11/23 2306   TSH 3RD GENERATON uIU/mL 3.733     Results from last 7 days   Lab Units 11/11/23 2215   PROCALCITONIN ng/ml 0.10     Results from last 7 days   Lab Units 11/11/23  2213   LACTIC ACID mmol/L 1.3 Results from last 7 days   Lab Units 11/11/23  2215   BNP pg/mL 47                     Results from last 7 days   Lab Units 11/11/23  2306 11/07/23  2012   LIPASE u/L 7* 7*                 Results from last 7 days   Lab Units 11/12/23  0203   CLARITY UA  Clear   COLOR UA  Yellow   SPEC GRAV UA  <=1.005   PH UA  6.5   GLUCOSE UA mg/dl Negative   KETONES UA mg/dl Trace*   BLOOD UA  Negative   PROTEIN UA mg/dl Trace*   NITRITE UA  Negative   BILIRUBIN UA  Negative   UROBILINOGEN UA E.U./dl 0.2   LEUKOCYTES UA  Negative   WBC UA /hpf None Seen   RBC UA /hpf None Seen   BACTERIA UA /hpf None Seen   EPITHELIAL CELLS WET PREP /hpf None Seen     Results from last 7 days   Lab Units 11/11/23  2159   INFLUENZA A PCR  Negative   INFLUENZA B PCR  Negative   RSV PCR  Negative         ED Treatment:   Medication Administration from 11/11/2023 2131 to 11/12/2023 0305         Date/Time Order Dose Route Action     11/11/2023 2342 EST sodium chloride 0.9 % bolus 1,000 mL 0 mL Intravenous Stopped     11/11/2023 2231 EST sodium chloride 0.9 % bolus 1,000 mL 1,000 mL Intravenous New Bag     11/11/2023 2238 EST ondansetron (ZOFRAN) injection 4 mg 4 mg Intravenous Given     11/11/2023 2238 EST dicyclomine (BENTYL) tablet 20 mg 20 mg Oral Given     11/11/2023 2152 EST ipratropium (ATROVENT) 0.02 % inhalation solution 0.5 mg 0.5 mg Nebulization Given     11/11/2023 2152 EST albuterol inhalation solution 5 mg 5 mg Nebulization Given     11/11/2023 2354 EST cefTRIAXone (ROCEPHIN) IVPB (premix in dextrose) 2,000 mg 50 mL 0 mg Intravenous Stopped     11/11/2023 2315 EST cefTRIAXone (ROCEPHIN) IVPB (premix in dextrose) 2,000 mg 50 mL 2,000 mg Intravenous New Bag     11/12/2023 0039 EST iohexol (OMNIPAQUE) 350 MG/ML injection (MULTI-DOSE) 100 mL 100 mL Intravenous Given     11/12/2023 0111 EST ondansetron (ZOFRAN) injection 4 mg -- Intravenous Canceled Entry     11/12/2023 0108 EST ondansetron (ZOFRAN) injection 4 mg 4 mg Intravenous Given No past medical history on file. Present on Admission:  **None**      Admitting Diagnosis: Abdominal pain [R10.9]  Adrenal nodule (HCC) [E27.8]  Hypoxia [R09.02]  Bilateral pneumonia [J18.9]  Bilateral pleural effusion [J90]  Pulmonary vascular congestion [R09.89]  Lower abdominal pain of unknown etiology [R10.30]  Age/Sex: 76 y.o. female    Admission Orders:  Scheduled Medications:  dicyclomine, 10 mg, Oral, 4x Daily (AC & HS)  docusate sodium, 100 mg, Oral, BID  furosemide, 40 mg, Intravenous, Daily  heparin (porcine), 5,000 Units, Subcutaneous, Q8H ADA  nystatin, , Topical, BID  polyethylene glycol, 17 g, Oral, BID AC  predniSONE, 5 mg, Oral, BID With Meals  pregabalin, 150 mg, Oral, BID  senna, 1 tablet, Oral, Daily      Continuous IV Infusions:     PRN Meds:  acetaminophen, 650 mg, Oral, Q6H PRN  influenza vaccine, 0.7 mL, Intramuscular, Prior to discharge  ondansetron, 4 mg, Intravenous, Q6H PRN        None    Network Utilization Review Department  ATTENTION: Please call with any questions or concerns to [phone] and carefully listen to the prompts so that you are directed to the right person. All voicemails are confidential.   For Discharge needs, contact Care Management DC Support Team at [phone] opt. [phone]  Send all requests for admission clinical reviews, approved or denied determinations and any other requests to dedicated fax number below belonging to the campus where the patient is receiving treatment.  List of dedicated fax numbers for the Facilities:  [place] DENIALS (Administrative/Medical Necessity) [phone]   DISCHARGE SUPPORT TEAM (NETWORK) [id number] Wilks (Maternity/NICU/Pediatrics) [phone]   [address] [phone] [address] [facility] [address] [address] [facility] [address] [phone]

## 2023-11-12 NOTE — ASSESSMENT & PLAN NOTE
Incidental finding status post CT imaging.   Also noted renal cyst  Repeat imaging in 1 year  Communicated to patient

## 2023-11-12 NOTE — ASSESSMENT & PLAN NOTE
Initial presentation to the ED on 11/7 with x-ray revealing dense opacity suspicious for consolidation versus mass  On this presentation status CT chest revealing: Diffuse reticular changes in the lungs; this finding in conjunction with small bilateral pleural effusions is consistent with pulmonary venous congestion. Superimposed nodular and groundglass opacities predominantly in the upper lobes noted, suspicious   for superimposed infection. No clinical sign of acute infectious process other than minimal leukocytosis which may be steroid-induced. In addition to low procalcitonin  Patient with underlying history of rheumatoid arthritis, was previously on methotrexate which was discontinued more than a year ago. 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   Will need PFTs as outpatient  No clinical sign of CHF decompensation, low BNP  Negative troponin x1  At this time she is oxygen independent  COVID/influenza/RSV ruled out  Recommend referral to pulmonary for further evaluation as outpatient

## 2023-11-12 NOTE — PLAN OF CARE
Problem: PAIN - ADULT  Goal: Verbalizes/displays adequate comfort level or baseline comfort level  Description: Interventions:  - Encourage patient to monitor pain and request assistance  - Assess pain using appropriate pain scale  - Administer analgesics based on type and severity of pain and evaluate response  - Implement non-pharmacological measures as appropriate and evaluate response  - Consider cultural and social influences on pain and pain management  - Notify physician/advanced practitioner if interventions unsuccessful or patient reports new pain  Outcome: Progressing     Problem: INFECTION - ADULT  Goal: Absence or prevention of progression during hospitalization  Description: INTERVENTIONS:  - Assess and monitor for signs and symptoms of infection  - Monitor lab/diagnostic results  - Monitor all insertion sites, i.e. indwelling lines, tubes, and drains  - Monitor endotracheal if appropriate and nasal secretions for changes in amount and color  - [place] appropriate cooling/warming therapies per order  - Administer medications as ordered  - Instruct and encourage patient and family to use good hand hygiene technique  - Identify and instruct in appropriate isolation precautions for identified infection/condition  Outcome: Progressing  Goal: Absence of fever/infection during neutropenic period  Description: INTERVENTIONS:  - Monitor WBC    Outcome: Progressing

## 2023-11-12 NOTE — ED PROVIDER NOTES
History  Chief Complaint   Patient presents with    Abdominal Pain     Pt arrives EMS from home c/o lower abd pain x weeks. C/o associated frequent urination, lower back pain, and some vomiting. Found to be in high 80's on RA, placed on NC. 95% on 2L NC     Patient is a 20-year-old female with past medical history of fibromyalgia, rheumatoid arthritis on daily prednisone 5 mg, presents to the emergency department complaining of persistent and worsening lower abdominal pain radiating into her back, nausea, vomiting, frequent urination. States that for the past several weeks she has had lower abdominal pain all across her lower abdomen that radiates to her back. The pain has been fairly constant and is getting progressively worse. More recently she has had nausea and vomiting over the past several days. About 5 days ago she had 1 day of diarrhea but that has resolved. She reports subjective fevers and chills at home for the past few weeks as well as increased urinary frequency and a hot sensation when she urinates but denies any pain with urination. Patient does state that for several months she has had a productive cough of yellow phlegm. She denies any headache, dizziness or near syncope, hemoptysis, chest pain, palpitations, dyspnea, abdominal distention, blood per rectum or melena, hematemesis, gross hematuria, skin rash or color change, leg pain or swelling, extremity weakness or paresthesia or other focal neurologic deficits. Patient has been seen in the ED for the abdominal pain and nausea on 11/7. She had a CT scan and chest x-ray at that time. CT showed adrenal nodule for which she will need follow-up as well as subacute sacral fracture. Patient states she had a fall in December and injured her hip and back but denies any fall since then. Denies any sacral pain currently.   Patient also had a chest x-ray on 11/7 which showed a trace left pleural effusion and a patchy density in the right midlung which could represent pneumonia however underlying mass or nodule could not be excluded. According to EMS who brought patient into the ED tonight, her O2 sat on room air was in the high 80s so she was placed on 2 L nasal cannula. O2 sat currently on 2 L is 94%. Patient denies any home oxygen use, any current dyspnea. She does smoke cigarettes but denies any known history of COPD or asthma. History provided by:  Patient, EMS personnel and medical records   used: No    Abdominal Pain  Associated symptoms: chills, cough, fever, nausea and vomiting    Associated symptoms: no chest pain, no constipation, no diarrhea, no dysuria, no hematuria, no shortness of breath and no sore throat        Prior to Admission Medications   Prescriptions Last Dose Informant Patient Reported? Taking?   dicyclomine (BENTYL) 20 mg tablet   No No   Sig: Take 1 tablet (20 mg total) by mouth 2 (two) times a day   ondansetron (Zofran ODT) 4 mg disintegrating tablet   No No   Sig: Take 1 tablet (4 mg total) by mouth every 6 (six) hours as needed for nausea or vomiting      Facility-Administered Medications: None       No past medical history on file. No past surgical history on file. No family history on file. I have reviewed and agree with the history as documented. E-Cigarette/Vaping    E-Cigarette Use Never User      E-Cigarette/Vaping Substances     Social History     Tobacco Use    Smoking status: Every Day     Packs/day: 0.25     Types: Cigarettes    Smokeless tobacco: Never   Vaping Use    Vaping Use: Never used   Substance Use Topics    Alcohol use: Never    Drug use: Never       Review of Systems   Constitutional:  Positive for chills and fever. HENT:  Negative for congestion, ear pain, rhinorrhea and sore throat. Respiratory:  Positive for cough. Negative for chest tightness, shortness of breath and wheezing. Cardiovascular:  Negative for chest pain, palpitations and leg swelling. Gastrointestinal:  Positive for abdominal pain, nausea and vomiting. Negative for abdominal distention, blood in stool, constipation and diarrhea. Genitourinary:  Positive for flank pain and frequency. Negative for dysuria and hematuria. Musculoskeletal:  Positive for back pain. Negative for neck pain and neck stiffness. Skin:  Negative for color change, pallor, rash and wound. Allergic/Immunologic: Negative for immunocompromised state. Neurological:  Negative for dizziness, syncope, weakness, light-headedness, numbness and headaches. Hematological:  Negative for adenopathy. Psychiatric/Behavioral:  Negative for confusion and decreased concentration. All other systems reviewed and are negative. Physical Exam  Physical Exam  Vitals and nursing note reviewed. Constitutional:       General: She is not in acute distress. Appearance: Normal appearance. She is well-developed. She is not ill-appearing, toxic-appearing or diaphoretic. HENT:      Head: Normocephalic and atraumatic. Right Ear: External ear normal.      Left Ear: External ear normal.      Mouth/Throat:      Mouth: Mucous membranes are moist.      Pharynx: Oropharynx is clear. Eyes:      Extraocular Movements: Extraocular movements intact. Conjunctiva/sclera: Conjunctivae normal.      Pupils: Pupils are equal, round, and reactive to light. Neck:      Vascular: No JVD. Cardiovascular:      Rate and Rhythm: Regular rhythm. Tachycardia present. Pulses: Normal pulses. Heart sounds: Normal heart sounds. No murmur heard. No friction rub. No gallop. Pulmonary:      Effort: Pulmonary effort is normal. No respiratory distress. Breath sounds: No wheezing, rhonchi or rales. Comments: Decreased air movement throughout, worse in bilateral lung bases. Abdominal:      General: There is no distension. Palpations: Abdomen is soft. Tenderness: There is abdominal tenderness.  There is left CVA tenderness. There is no right CVA tenderness, guarding or rebound. Comments: Diffuse abdominal tenderness present. +Left CVA tenderness. Musculoskeletal:         General: No swelling or tenderness. Normal range of motion. Cervical back: Normal range of motion and neck supple. No rigidity. Skin:     General: Skin is warm and dry. Coloration: Skin is not pale. Findings: No erythema or rash. Neurological:      General: No focal deficit present. Mental Status: She is alert and oriented to person, place, and time. Sensory: No sensory deficit. Motor: No weakness.    Psychiatric:         Mood and Affect: Mood normal.         Behavior: Behavior normal.         Vital Signs  ED Triage Vitals   Temperature Pulse Respirations Blood Pressure SpO2   11/11/23 2143 11/11/23 2135 11/11/23 2135 11/11/23 2135 11/11/23 2135   98.7 °F (37.1 °C) (!) 106 16 99/72 93 %      Temp Source Heart Rate Source Patient Position - Orthostatic VS BP Location FiO2 (%)   11/11/23 2143 11/11/23 2135 11/11/23 2300 11/11/23 2135 --   Oral Monitor Lying Right arm       Pain Score       --                Vitals:    11/11/23 2143 11/11/23 2300 11/12/23 0100 11/12/23 0200   BP:  96/57 103/63 100/65   BP Location:  Right arm Right arm Right arm   Pulse:  100 87 86   Resp:  (!) 31 (!) 26 (!) 23   Temp: 98.7 °F (37.1 °C)      TempSrc: Oral      SpO2:  96% 98% 97%          Visual Acuity      ED Medications  Medications   sodium chloride 0.9 % bolus 1,000 mL (0 mL Intravenous Stopped 11/11/23 2342)   ondansetron (ZOFRAN) injection 4 mg (4 mg Intravenous Given 11/11/23 2238)   dicyclomine (BENTYL) tablet 20 mg (20 mg Oral Given 11/11/23 2238)   ipratropium (ATROVENT) 0.02 % inhalation solution 0.5 mg (0.5 mg Nebulization Given 11/11/23 2152)   albuterol inhalation solution 5 mg (5 mg Nebulization Given 11/11/23 2152)   cefTRIAXone (ROCEPHIN) IVPB (premix in dextrose) 2,000 mg 50 mL (0 mg Intravenous Stopped 11/11/23 7019) iohexol (OMNIPAQUE) 350 MG/ML injection (MULTI-DOSE) 100 mL (100 mL Intravenous Given 11/12/23 0039)   ondansetron (ZOFRAN) injection 4 mg ( Intravenous Canceled Entry 11/12/23 0111)       Diagnostic Studies  Results Reviewed       Procedure Component Value Units Date/Time    Urine Microscopic [[id number]]  (Normal) Collected: 11/12/23 0203    Lab Status: Final result Specimen: Urine, Clean Catch Updated: 11/12/23 0222     RBC, UA None Seen /hpf      WBC, UA None Seen /hpf      Epithelial Cells None Seen /hpf      Bacteria, UA None Seen /hpf     UA (URINE) with reflex to Scope [[id number]]  (Abnormal) Collected: 11/12/23 0203    Lab Status: Final result Specimen: Urine, Clean Catch Updated: 11/12/23 0208     Color, UA Yellow     Clarity, UA Clear     Specific Gravity, UA <=1.005     pH, UA 6.5     Leukocytes, UA Negative     Nitrite, UA Negative     Protein, UA Trace mg/dl      Glucose, UA Negative mg/dl      Ketones, UA Trace mg/dl      Bilirubin, UA Negative     Occult Blood, UA Negative     Urobilinogen, UA 0.2 E.U./dl     Urine culture [[id number]] Collected: 11/12/23 0203    Lab Status: No result Specimen: Urine, Clean Catch     Hepatic function panel [[id number]]  (Abnormal) Collected: 11/11/23 2306    Lab Status: Final result Specimen: Blood from Arm, Left Updated: 11/12/23 0005     Total Bilirubin 0.52 mg/dL      Bilirubin, Direct 0.16 mg/dL      Alkaline Phosphatase 74 U/L      AST 11 U/L      ALT 3 U/L      Total Protein 7.0 g/dL      Albumin 3.2 g/dL     Basic metabolic panel [[id number]]  (Abnormal) Collected: 11/11/23 2306    Lab Status: Final result Specimen: Blood from Arm, Left Updated: 11/11/23 2352     Sodium 136 mmol/L      Potassium 4.1 mmol/L      Chloride 100 mmol/L      CO2 28 mmol/L      ANION GAP 8 mmol/L      BUN 13 mg/dL      Creatinine 0.62 mg/dL      Glucose 165 mg/dL      Calcium 9.6 mg/dL      eGFR 92 ml/min/1.73sq m     Narrative:      [facility] guidelines for Chronic Kidney Disease (CKD):     Stage 1 with normal or high GFR (GFR > 90 mL/min/1.73 square meters)    Stage 2 Mild CKD (GFR = 60-89 mL/min/1.73 square meters)    Stage 3A Moderate CKD (GFR = 45-59 mL/min/1.73 square meters)    Stage 3B Moderate CKD (GFR = 30-44 mL/min/1.73 square meters)    Stage 4 Severe CKD (GFR = 15-29 mL/min/1.73 square meters)    Stage 5 End Stage CKD (GFR <15 mL/min/1.73 square meters)  Note: GFR calculation is accurate only with a steady state creatinine    Magnesium [[id number]]  (Normal) Collected: 11/11/23 2306    Lab Status: Final result Specimen: Blood from Arm, Left Updated: 11/11/23 2352     Magnesium 1.9 mg/dL     Lipase [[id number]]  (Abnormal) Collected: 11/11/23 2306    Lab Status: Final result Specimen: Blood from Arm, Left Updated: 11/11/23 2352     Lipase 7 u/L     APTT [[id number]]  (Normal) Collected: 11/11/23 2306    Lab Status: Final result Specimen: Blood from Arm, Left Updated: 11/11/23 2333     PTT 25 seconds     Protime-INR [[id number]]  (Abnormal) Collected: 11/11/23 2306    Lab Status: Final result Specimen: Blood from Arm, Left Updated: 11/11/23 2333     Protime 15.5 seconds      INR 1.16    Lactic acid, plasma (w/reflex if result > 2.0) [[id number]]  (Normal) Collected: 11/11/23 2215    Lab Status: Final result Specimen: Blood from Arm, Right Updated: 11/11/23 2301     LACTIC ACID 1.3 mmol/L     Narrative:      Result may be elevated if tourniquet was used during collection.     Procalcitonin [[id number]]  (Normal) Collected: 11/11/23 2215    Lab Status: Final result Specimen: Blood from Arm, Right Updated: 11/11/23 2300     Procalcitonin 0.10 ng/ml     HS Troponin 0hr (reflex protocol) [[id number]]  (Normal) Collected: 11/11/23 2215    Lab Status: Final result Specimen: Blood from Arm, Right Updated: 11/11/23 2259     hs TnI 0hr 3 ng/L     B-Type Natriuretic Peptide(BNP) [[id number]]  (Normal) Collected: 11/11/23 2215    Lab Status: Final result Specimen: Blood from Arm, Right Updated: 11/11/23 2256     BNP 47 pg/mL     FLU/RSV/COVID - if FLU/RSV clinically relevant [[id number]]  (Normal) Collected: 11/11/23 2159    Lab Status: Final result Specimen: Nares from Nose Updated: 11/11/23 2249     SARS-CoV-2 Negative     INFLUENZA A PCR Negative     INFLUENZA B PCR Negative     RSV PCR Negative    Narrative:      FOR PEDIATRIC PATIENTS - copy/paste COVID Guidelines URL to browser: [email or web address]    SARS-CoV-2 assay is a Nucleic Acid Amplification assay intended for the  qualitative detection of nucleic acid from SARS-CoV-2 in nasopharyngeal  swabs. Results are for the presumptive identification of SARS-CoV-2 RNA. Positive results are indicative of infection with SARS-CoV-2, the virus  causing COVID-19, but do not rule out bacterial infection or co-infection  with other viruses. Laboratories within the [facility] and its  territories are required to report all positive results to the appropriate  public health authorities. Negative results do not preclude SARS-CoV-2  infection and should not be used as the sole basis for treatment or other  patient management decisions. Negative results must be combined with  clinical observations, patient history, and epidemiological information. This test has not been FDA cleared or approved. This test has been authorized by FDA under an Emergency Use Authorization  (EUA). This test is only authorized for the duration of time the  declaration that circumstances exist justifying the authorization of the  emergency use of an in vitro diagnostic tests for detection of SARS-CoV-2  virus and/or diagnosis of COVID-19 infection under section 564(b)(1) of  the Act, 21 U. S.C. 525INY-4(S)(6), unless the authorization is terminated  or revoked sooner. The test has been validated but independent review by FDA  and CLIA is pending. Test performed using [employer] network:  This RT-PCR assay targets N2,  a region unique to SARS-CoV-2. A conserved region in the E-gene was chosen  for pan-Sarbecovirus detection which includes SARS-CoV-2. According to CMS-2020-01-R, this platform meets the definition of high-throughput technology. CBC and differential [[id number]]  (Abnormal) Collected: 11/11/23 2215    Lab Status: Final result Specimen: Blood from Arm, Right Updated: 11/11/23 2230     WBC 11.70 Thousand/uL      RBC 4.39 Million/uL      Hemoglobin 13.1 g/dL      Hematocrit 42.1 %      MCV 96 fL      MCH 29.8 pg      MCHC 31.1 g/dL      RDW 13.5 %      MPV 11.4 fL      Platelets 188 Thousands/uL      nRBC 0 /100 WBCs      Neutrophils Relative 85 %      Immat GRANS % 0 %      Lymphocytes Relative 6 %      Monocytes Relative 7 %      Eosinophils Relative 2 %      Basophils Relative 0 %      Neutrophils Absolute 9.88 Thousands/µL      Immature Grans Absolute 0.04 Thousand/uL      Lymphocytes Absolute 0.72 Thousands/µL      Monocytes Absolute 0.78 Thousand/µL      Eosinophils Absolute 0.25 Thousand/µL      Basophils Absolute 0.03 Thousands/µL     Blood culture #1 [[id number]] Collected: 11/11/23 2217    Lab Status: In process Specimen: Blood Updated: 11/11/23 2227    Blood culture #2 [[id number]] Collected: 11/11/23 2215    Lab Status: In process Specimen: Blood from Arm, Right Updated: 11/11/23 2225                   CT chest abdomen pelvis w contrast   Final Result by Sabrina Carver DO (11/12 0159)      Diffuse reticular changes in the lungs; this finding in conjunction with small bilateral pleural effusions is consistent with pulmonary venous congestion. Superimposed nodular and groundglass opacities predominantly in the upper lobes noted, suspicious    for superimposed infection. 1.4 cm right adrenal nodule again seen. 12-month follow-up CT is recommended to ensure stability. Biochemical evaluation is also suggested to rule out functioning adenoma if not previously performed.       This study was marked for "Immediate" notification in EPIC. Workstation performed: [id number]         XR chest 1 view portable   ED Interpretation by Mela Santiago DO (11/11 2243)   Worsening patchy density in the right midlung and possibly the right lung base. Small right pleural effusion and trace left pleural effusion. Procedures  ECG 12 Lead Documentation Only    Date/Time: 11/11/2023 10:20 PM    Performed by: Mela Santiago DO  Authorized by: Mela Santiago DO    ECG reviewed by me, the ED Provider: yes    Patient location:  ED  Previous ECG:     Previous ECG:  Compared to current    Comparison ECG info:  11-7-2023, nonspecific T wave abnormality and T wave inversion more evident in inferior and anterior leads    Similarity:  Changes noted  Rate:     ECG rate:  106    ECG rate assessment: tachycardic    Rhythm:     Rhythm: sinus tachycardia    Ectopy:     Ectopy: none    QRS:     QRS axis:  Normal    QRS intervals:  Normal  Conduction:     Conduction: normal    ST segments:     ST segments:  Normal  T waves:     T waves: non-specific and inverted      Inverted:  III, aVF and V3           ED Course  ED Course as of 11/12/23 0236   Sat Nov 11, 2023   2241 WBC(!): 11.70  Likely increased from 9.0 on 11/7.   2243 Chest x-ray reviewed and shows worsening patchy density in the right lung concerning for pneumonia. Will give a dose of Rocephin 2 g.   2307 LACTIC ACID: 1.3   2328 BNP: 47   Sun Nov 12, 2023   0211 Leukocytes, UA: Negative   0211 Nitrite, UA: Negative   0228 Updated patient about CT findings and plan to admit for treatment of pneumonia. Patient is agreeable. Medical Decision Making  42-year-old female presents to the ED for several weeks of progressively worsening lower abdominal pain, back pain, nausea with recent vomiting, subjective fevers and chills, urinary frequency.   Patient recently seen in the ED and had abnormal chest x-ray and patient does report productive cough recently. She also had CT scan of the abdomen and pelvis which showed no acute abnormality however given her worsening symptoms, will repeat imaging and include CT chest to definitively assess for either pneumonia or lung mass. Will work-up with cardiac and abdominal as well as infectious labs given possibility of UTI or pyelonephritis based on her symptoms. Other possibilities for her abdominal complaints includes diverticulitis, colitis, enteritis, appendicitis. Will provide IV fluids, Zofran, Bentyl for symptom relief. Given that patient did have prehospital hypoxia requiring 2 L nasal cannula and she sounds tight on exam, will give albuterol/Atrovent breathing treatment. Will swab for COVID/flu/RSV. Amount and/or Complexity of Data Reviewed  Independent Historian: EMS  External Data Reviewed: radiology and notes. Details: Reviewed recent ED visit including ED provider note, labs and CT scan results as well as chest x-ray results from 11/7/23  Labs: ordered. Decision-making details documented in ED Course. Radiology: ordered and independent interpretation performed. Decision-making details documented in ED Course. ECG/medicine tests: ordered and independent interpretation performed. Decision-making details documented in ED Course. Risk  Prescription drug management. Decision regarding hospitalization.              Disposition  Final diagnoses:   Bilateral pneumonia   Pulmonary vascular congestion   Lower abdominal pain of unknown etiology   Hypoxia   Adrenal nodule (HCC) - Incidental; Recommend 12 month follow up CT scan for surveillance   Bilateral pleural effusion     Time reflects when diagnosis was documented in both MDM as applicable and the Disposition within this note       Time User Action Codes Description Comment    11/12/2023  2:32 AM Claudette Roque [J18.9] Bilateral pneumonia     11/12/2023  2:32 AM Marky Rg Add [R09.89] Pulmonary vascular congestion     11/12/2023  2:32 AM Rajendra Pier E Add [R10.30] Lower abdominal pain of unknown etiology     11/12/2023  2:32 AM Rajendra Pier E Add [R09.02] Hypoxia     11/12/2023  2:33 AM Rajednra Pier E Add [E27.8] Adrenal nodule ([address])     11/12/2023  2:33 AM Rajendra Pier E Modify [E27.8] Adrenal nodule ([address]) Incidental    11/12/2023  2:33 AM Rajendra Pier E Modify [E27.8] Adrenal nodule ([address]) Incidental; Recommend 12 month follow up CT scan for surveillance    11/12/2023  2:36 AM Rajendra Pier E Add [J90] Bilateral pleural effusion           ED Disposition       ED Disposition   Admit    Condition   Stable    Date/Time   Sun Nov 12, 2023  2:33 AM    Comment   Case was discussed with ROBBIE and the patient's admission status was agreed to be Admission Status: inpatient status to the service of Dr. Renetta Russo . Follow-up Information    None         Patient's Medications   Discharge Prescriptions    No medications on file       No discharge procedures on file.     PDMP Review       None            ED Provider  Electronically Signed by             Peg Avila DO  11/12/23 0236

## 2023-11-12 NOTE — ASSESSMENT & PLAN NOTE
Incidental finding status post CT imaging.   Also noted renal cyst  Repeat imaging in 1 year  OP follow up  Communicated to patient

## 2023-11-13 ENCOUNTER — APPOINTMENT (INPATIENT)
Dept: CT IMAGING | Facility: HOSPITAL | Age: 68
DRG: 393 | End: 2023-11-13
Payer: COMMERCIAL

## 2023-11-13 PROBLEM — G93.40 ACUTE ENCEPHALOPATHY: Status: ACTIVE | Noted: 2023-11-13

## 2023-11-13 PROBLEM — D72.829 LEUKOCYTOSIS: Status: RESOLVED | Noted: 2023-11-12 | Resolved: 2023-11-13

## 2023-11-13 LAB
ALBUMIN SERPL BCP-MCNC: 3.4 G/DL (ref 3.5–5)
ALP SERPL-CCNC: 80 U/L (ref 34–104)
ALT SERPL W P-5'-P-CCNC: 4 U/L (ref 7–52)
AMMONIA PLAS-SCNC: 27 UMOL/L (ref 18–72)
ANION GAP SERPL CALCULATED.3IONS-SCNC: 5 MMOL/L
AST SERPL W P-5'-P-CCNC: 15 U/L (ref 13–39)
BASE EX.OXY STD BLDV CALC-SCNC: 95.8 % (ref 60–80)
BASE EXCESS BLDV CALC-SCNC: 5.7 MMOL/L
BILIRUB SERPL-MCNC: 0.24 MG/DL (ref 0.2–1)
BUN SERPL-MCNC: 12 MG/DL (ref 5–25)
CALCIUM ALBUM COR SERPL-MCNC: 10 MG/DL (ref 8.3–10.1)
CALCIUM SERPL-MCNC: 9.5 MG/DL (ref 8.4–10.2)
CHLORIDE SERPL-SCNC: 101 MMOL/L (ref 96–108)
CO2 SERPL-SCNC: 33 MMOL/L (ref 21–32)
CREAT SERPL-MCNC: 0.61 MG/DL (ref 0.6–1.3)
ERYTHROCYTE [DISTWIDTH] IN BLOOD BY AUTOMATED COUNT: 13.3 % (ref 11.6–15.1)
GFR SERPL CREATININE-BSD FRML MDRD: 93 ML/MIN/1.73SQ M
GLUCOSE SERPL-MCNC: 140 MG/DL (ref 65–140)
GLUCOSE SERPL-MCNC: 148 MG/DL (ref 65–140)
HCO3 BLDV-SCNC: 32.2 MMOL/L (ref 24–30)
HCT VFR BLD AUTO: 39 % (ref 34.8–46.1)
HGB BLD-MCNC: 12 G/DL (ref 11.5–15.4)
MCH RBC QN AUTO: 30.2 PG (ref 26.8–34.3)
MCHC RBC AUTO-ENTMCNC: 30.8 G/DL (ref 31.4–37.4)
MCV RBC AUTO: 98 FL (ref 82–98)
O2 CT BLDV-SCNC: 18.7 ML/DL
PCO2 BLDV: 54.6 MM HG (ref 42–50)
PH BLDV: 7.39 [PH] (ref 7.3–7.4)
PLATELET # BLD AUTO: 286 THOUSANDS/UL (ref 149–390)
PMV BLD AUTO: 11.4 FL (ref 8.9–12.7)
PO2 BLDV: 190.8 MM HG (ref 35–45)
POTASSIUM SERPL-SCNC: 4.5 MMOL/L (ref 3.5–5.3)
PROT SERPL-MCNC: 7.3 G/DL (ref 6.4–8.4)
RBC # BLD AUTO: 3.97 MILLION/UL (ref 3.81–5.12)
SODIUM SERPL-SCNC: 139 MMOL/L (ref 135–147)
VIT B12 SERPL-MCNC: 239 PG/ML (ref 180–914)
WBC # BLD AUTO: 7.21 THOUSAND/UL (ref 4.31–10.16)

## 2023-11-13 PROCEDURE — 82607 VITAMIN B-12: CPT | Performed by: INTERNAL MEDICINE

## 2023-11-13 PROCEDURE — 80053 COMPREHEN METABOLIC PANEL: CPT | Performed by: INTERNAL MEDICINE

## 2023-11-13 PROCEDURE — G1004 CDSM NDSC: HCPCS

## 2023-11-13 PROCEDURE — 82805 BLOOD GASES W/O2 SATURATION: CPT | Performed by: PHYSICIAN ASSISTANT

## 2023-11-13 PROCEDURE — 70450 CT HEAD/BRAIN W/O DYE: CPT

## 2023-11-13 PROCEDURE — 82948 REAGENT STRIP/BLOOD GLUCOSE: CPT

## 2023-11-13 PROCEDURE — 82140 ASSAY OF AMMONIA: CPT | Performed by: INTERNAL MEDICINE

## 2023-11-13 PROCEDURE — 99233 SBSQ HOSP IP/OBS HIGH 50: CPT | Performed by: INTERNAL MEDICINE

## 2023-11-13 PROCEDURE — 85027 COMPLETE CBC AUTOMATED: CPT | Performed by: INTERNAL MEDICINE

## 2023-11-13 RX ADMIN — POLYETHYLENE GLYCOL 3350 17 G: 17 POWDER, FOR SOLUTION ORAL at 17:36

## 2023-11-13 RX ADMIN — NYSTATIN 1 APPLICATION: 100000 POWDER TOPICAL at 17:37

## 2023-11-13 RX ADMIN — DICYCLOMINE HYDROCHLORIDE 10 MG: 10 CAPSULE ORAL at 17:36

## 2023-11-13 RX ADMIN — PREDNISONE 5 MG: 5 TABLET ORAL at 17:36

## 2023-11-13 RX ADMIN — HEPARIN SODIUM 5000 UNITS: 5000 INJECTION INTRAVENOUS; SUBCUTANEOUS at 06:22

## 2023-11-13 RX ADMIN — PREGABALIN 150 MG: 75 CAPSULE ORAL at 08:29

## 2023-11-13 RX ADMIN — DICYCLOMINE HYDROCHLORIDE 10 MG: 10 CAPSULE ORAL at 20:52

## 2023-11-13 RX ADMIN — PREDNISONE 5 MG: 5 TABLET ORAL at 08:29

## 2023-11-13 RX ADMIN — DICYCLOMINE HYDROCHLORIDE 10 MG: 10 CAPSULE ORAL at 12:52

## 2023-11-13 RX ADMIN — DICYCLOMINE HYDROCHLORIDE 10 MG: 10 CAPSULE ORAL at 08:29

## 2023-11-13 RX ADMIN — DOCUSATE SODIUM 100 MG: 100 CAPSULE, LIQUID FILLED ORAL at 08:29

## 2023-11-13 RX ADMIN — HEPARIN SODIUM 5000 UNITS: 5000 INJECTION INTRAVENOUS; SUBCUTANEOUS at 21:04

## 2023-11-13 RX ADMIN — DOCUSATE SODIUM 100 MG: 100 CAPSULE, LIQUID FILLED ORAL at 17:36

## 2023-11-13 RX ADMIN — SENNOSIDES 8.6 MG: 8.6 TABLET, FILM COATED ORAL at 08:29

## 2023-11-13 RX ADMIN — HEPARIN SODIUM 5000 UNITS: 5000 INJECTION INTRAVENOUS; SUBCUTANEOUS at 17:36

## 2023-11-13 RX ADMIN — POLYETHYLENE GLYCOL 3350 17 G: 17 POWDER, FOR SOLUTION ORAL at 08:30

## 2023-11-13 RX ADMIN — NYSTATIN 1 APPLICATION: 100000 POWDER TOPICAL at 08:30

## 2023-11-13 NOTE — MALNUTRITION/BMI
This medical record reflects one or more clinical indicators suggestive of malnutrition. Malnutrition Findings:   Adult Malnutrition type: Acute illness  Adult Degree of Malnutrition: Malnutrition of moderate degree  Malnutrition Characteristics: Fat loss, Weight loss    360 Statement: Related to acute illness as evidenced by Weight loss of 10 lbs (9.3%) over 5 days, significant; Visually observed mild fat loss (orbitals). Treated with diet and supplement initiation. BMI Findings: Body mass index is 17.42 kg/m². See Nutrition note dated 11/13/2023 for additional details. Completed nutrition assessment is viewable in the nutrition documentation.
PACU

## 2023-11-13 NOTE — ASSESSMENT & PLAN NOTE
Patient allegedly had some confusion and disorientation in the night of 11/12/2023. Currently AAOx2 (self and place but not time). Neurological exam is nonfocal.  CT head with " No acute intracranial hemorrhage or acute territorial infarction. Likely chronic lacunar infarctions in the bilateral basal ganglia, however, no prior study available to assess stability of this appearance. "  Recent TSH normal 3.7  VBG with normal pH and no CO2 retention  Suspect metabolic component, potentially related to alkalosis due to Lasix that was given that day, also potentially worsened by ongoing use of Lyrica in the setting of diuresis it may have accumulated and caused encephalopathy.     Hold Lyrica for now  Check B12, ammonia  If she develops any focality on exam consider MRI

## 2023-11-13 NOTE — PROGRESS NOTES
[address]  Progress Note  Name: Suzy Cardona  MRN: [id number]  Unit/Bed#: -01 I Date of Admission: 11/11/2023   Date of Service: 11/13/2023 I Hospital Day: 1    Assessment/Plan   Acute encephalopathy  Assessment & Plan  Patient allegedly had some confusion and disorientation in the night of 11/12/2023. Currently AAOx2 (self and place but not time). Neurological exam is nonfocal.  CT head with " No acute intracranial hemorrhage or acute territorial infarction. Likely chronic lacunar infarctions in the bilateral basal ganglia, however, no prior study available to assess stability of this appearance. "  Recent TSH normal 3.7  VBG with normal pH and no CO2 retention  Suspect metabolic component, potentially related to alkalosis due to Lasix that was given that day, also potentially worsened by ongoing use of Lyrica in the setting of diuresis it may have accumulated and caused encephalopathy. Hold Lyrica for now  Check B12, ammonia  If she develops any focality on exam consider MRI      * Abdominal pain  Assessment & Plan  Ongoing mesogastric and epigastric abdominal pain on occasion, Unclear etiology to date  Status post CT scan of abdomen pelvis with no acute intra-abdominal pathology  UA is benign. Lactic acid so low suspicion for ischemic colitis  Patient chronically on steroids but region of pain not consistent with gastritis and she denies any alarm symptoms  Continue on Bentyl  OP follow up with GI seems appropriate    Rheumatoid arthritis [facility])  Assessment & Plan  Chronically on prednisone  Continue Lyrica    Respiratory insufficiency  Assessment & Plan  Initial presentation to the ED on 11/7 with x-ray revealing dense opacity suspicious for consolidation versus mass  On this presentation status CT chest revealing: Diffuse reticular changes in the lungs; this finding in conjunction with small bilateral pleural effusions is consistent with pulmonary venous congestion. Superimposed nodular and groundglass opacities predominantly in the upper lobes noted, suspicious   for superimposed infection. No clinical sign of acute infectious process other than minimal leukocytosis which may be steroid-induced. In addition to low procalcitonin  No clinical sign of CHF decompensation, low BNP but given her symptoms she is now status post Lasix trial with partial improvement in breathing - now looks dry  COVID/influenza/RSV ruled out    Patient with underlying history of rheumatoid arthritis, was previously on methotrexate which was discontinued more than a year ago. 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 Also noted renal cyst  Repeat imaging in 1 year  OP follow up  Communicated to patient    Leukocytosis-resolved as of 11/13/2023  Assessment & Plan  Likely steroid-induced patient is chronically on prednisone           VTE Pharmacologic Prophylaxis:   Pharmacologic: Heparin  Mechanical VTE Prophylaxis in Place: Yes    Patient Centered Rounds: I have performed bedside rounds with nursing staff today. Discussions with Specialists or Other Care Team Provider: Discussed with care management team    Education and Discussions with Family / Patient:   Patient herself, there is no one to talk    Time Spent for Care: 45 minutes. More than 50% of total time spent on counseling and coordination of care as described above. Current Length of Stay: 1 day(s)    Current Patient Status: Inpatient   Certification Statement: The patient will continue to require additional inpatient hospital stay due to need for improvement in mental status and workup.     Discharge Plan: 24-48h pending mental status    Code Status: Level 1 - Full Code      Subjective:     Patient related this morning. She did have some episodes of confusion yesterday. Today she is oriented to self and place but not time. No other events reported. Objective:     Vitals:   Temp (24hrs), Av.7 °F (36.5 °C), Min:97.2 °F (36.2 °C), Max:97.9 °F (36.6 °C)    Temp:  [97.2 °F (36.2 °C)-97.9 °F (36.6 °C)] 97.2 °F (36.2 °C)  HR:  [87-95] 91  Resp:  [16] 16  BP: (89-97)/(56-64) 91/56  SpO2:  [90 %-97 %] 96 %  Body mass index is 17.42 kg/m². Input and Output Summary (last 24 hours): Intake/Output Summary (Last 24 hours) at 2023 1149  Last data filed at 2023 0900  Gross per 24 hour   Intake 540 ml   Output --   Net 540 ml       Physical Exam:     Physical Exam  Vitals and nursing note reviewed. Constitutional:       General: She is not in acute distress. Appearance: Normal appearance. She is normal weight. She is not ill-appearing, toxic-appearing or diaphoretic. HENT:      Head: Normocephalic and atraumatic. Right Ear: External ear normal.      Left Ear: External ear normal.      Nose: Nose normal. No congestion. Mouth/Throat:      Mouth: Mucous membranes are moist.      Pharynx: Oropharynx is clear. No oropharyngeal exudate or posterior oropharyngeal erythema. Eyes:      General: No scleral icterus. Right eye: No discharge. Left eye: No discharge. Extraocular Movements: Extraocular movements intact. Conjunctiva/sclera: Conjunctivae normal.      Pupils: Pupils are equal, round, and reactive to light. Cardiovascular:      Rate and Rhythm: Normal rate and regular rhythm. Pulses: Normal pulses. Heart sounds: Normal heart sounds. No murmur heard. No friction rub. No gallop. Pulmonary:      Effort: Pulmonary effort is normal. No respiratory distress. Breath sounds: Normal breath sounds. No stridor. No wheezing, rhonchi or rales. Chest:      Chest wall: No tenderness. Abdominal:      General: Abdomen is flat.  Bowel sounds are normal. There is no distension. Palpations: Abdomen is soft. There is no mass. Tenderness: There is no abdominal tenderness. There is no guarding or rebound. Musculoskeletal:         General: No swelling, tenderness, deformity or signs of injury. Normal range of motion. Cervical back: Normal range of motion and neck supple. No rigidity. No muscular tenderness. Skin:     General: Skin is warm and dry. Capillary Refill: Capillary refill takes less than 2 seconds. Coloration: Skin is not jaundiced or pale. Findings: No bruising, erythema, lesion or rash. Neurological:      General: No focal deficit present. Mental Status: She is alert. Mental status is at baseline. Cranial Nerves: No cranial nerve deficit. Sensory: No sensory deficit. Motor: No weakness. Coordination: Coordination normal.      Comments: Oriented to self and place but no time   Psychiatric:         Mood and Affect: Mood normal.         Behavior: Behavior normal.         Thought Content:  Thought content normal.         Judgment: Judgment normal.       Additional Data:     Labs:    Results from last 7 days   Lab Units 11/13/23  0436 11/11/23 2215   WBC Thousand/uL 7.21 11.70*   HEMOGLOBIN g/dL 12.0 13.1   HEMATOCRIT % 39.0 42.1   PLATELETS Thousands/uL 286 288   NEUTROS PCT %  --  85*   LYMPHS PCT %  --  6*   MONOS PCT %  --  7   EOS PCT %  --  2     Results from last 7 days   Lab Units 11/13/23  0436   SODIUM mmol/L 139   POTASSIUM mmol/L 4.5   CHLORIDE mmol/L 101   CO2 mmol/L 33*   BUN mg/dL 12   CREATININE mg/dL 0.61   ANION GAP mmol/L 5   CALCIUM mg/dL 9.5   ALBUMIN g/dL 3.4*   TOTAL BILIRUBIN mg/dL 0.24   ALK PHOS U/L 80   ALT U/L 4*   AST U/L 15   GLUCOSE RANDOM mg/dL 140     Results from last 7 days   Lab Units 11/11/23  2306   INR  1.16     Results from last 7 days   Lab Units 11/13/23  0008   POC GLUCOSE mg/dl 148*         Results from last 7 days   Lab Units 11/11/23  2215   LACTIC ACID mmol/L 1.3   PROCALCITONIN ng/ml 0.10           * I Have Reviewed All Lab Data Listed Above. * Additional Pertinent Lab Tests Reviewed: [address] Admission Reviewed      Recent Cultures (last 7 days):     Results from last 7 days   Lab Units 11/12/23  0203 11/11/23  2217 11/11/23  2215   BLOOD CULTURE   --  Received in Microbiology Lab. Culture in Progress. Received in Microbiology Lab. Culture in Progress. URINE CULTURE  Culture too young- will reincubate  --   --        Last 24 Hours Medication List:   Current Facility-Administered Medications   Medication Dose Route Frequency Provider Last Rate    acetaminophen  650 mg Oral Q6H PRN Aleksey Bullocks, DO      dicyclomine  10 mg Oral 4x Daily (AC & HS) Aleksey Bullocks, DO      docusate sodium  100 mg Oral BID Sheila Prieto, DO      heparin (porcine)  5,000 Units Subcutaneous Q8H [facility] [place] Prieto,       influenza vaccine  0.7 mL Intramuscular Prior to discharge Kerry Moreno MD      nystatin   Topical BID Aleksey Bullocks, DO      ondansetron  4 mg Intravenous Q6H PRN Sheila Prieto, DO      polyethylene glycol  17 g Oral BID AC Kerry Moreno MD      predniSONE  5 mg Oral BID With Meals Aleksey Bullocks, DO      senna  1 tablet Oral Daily Aleksey Bullocks, DO          Today, Patient Was Seen By: Kerry Moreno MD    ** Please Note: Dictation voice to text software may have been used in the creation of this document.  **

## 2023-11-13 NOTE — UTILIZATION REVIEW
NOTIFICATION OF INPATIENT ADMISSION   AUTHORIZATION REQUEST   [facility]  Tax ID: [id number]  NPI: [id number] ATTENDING PROVIDER:  Attending Name and NPI#: Jam Grimes, [place] [[id number]]  Address: Joseph Jones[address]  Phone: [phone]     ADMISSION INFORMATION:  Place of Service: [address] [place] [address]  Place of Service Code: 21  Inpatient Admission Date/Time: 11/12/23  2:33 AM  Discharge Date/Time: No discharge date for patient encounter. Admitting Diagnosis Code/Description:  Abdominal pain [R10.9]  Adrenal nodule (HCC) [E27.8]  Hypoxia [R09.02]  Bilateral pneumonia [J18.9]  Bilateral pleural effusion [J90]  Pulmonary vascular congestion [R09.89]  Lower abdominal pain of unknown etiology [R10.30]     UTILIZATION REVIEW CONTACT:  Dearl Phalen, Utilization   Network Utilization Review Department  Phone: [phone]  Fax [phone]  Email: [email or web address]  Contact for approvals/pending authorizations, clinical reviews, and discharge. PHYSICIAN ADVISORY SERVICES:  Medical Necessity Denial & Ujdk[employer]py-[email or web address] Review  Phone: [phone]  Fax: [phone]  Email: [email or web address]     DISCHARGE SUPPORT TEAM:  For Patients Discharge Needs & Updates  Phone: [phone] opt. 2 Fax: [phone]  Email: [email or web address]

## 2023-11-13 NOTE — QUICK NOTE
Notified by RN that patient very somnolent, difficult to arouse even with sternal rub. Per RN was told by dayshift staff that patient has been somnolent most of the day. Vital signs have been stable. SPO2 per review of chart has been in the 90s on room air. Patient has not received any sedating medications. Is prescribed Lyrica outpatient. Blood glucose now 148, electrolytes on labs seem okay  On approach patient is SPO2 96% on 2 L nasal cannula  Patient not responding to name only, but does respond to tactile stimuli including sternal rub. Does open eyes and is able to answer that she is in the hospital and feels okay. However, does  close eyes again and does not answer any other questions due lethargy. No grimace or notice of pain with palpation to abdomen. S1, S2 heart sounds heard, normal rate and rhythm. Lungs clear to auscultation. Did attempt to ask patient if she took any medications that were not ordered or from the hospital and due to lethargy she does not answer. Unknown encephalopathy at this time. We will check VBG despite SPO2 being stable and will also obtain stat CT head    For now with vital signs stable and patient does at least open eyes with light tactile stimuli we will continue closely monitor. Avoid any sedating medications. [facility] Speaker

## 2023-11-13 NOTE — PLAN OF CARE
Problem: MOBILITY - ADULT  Goal: Maintain or return to baseline ADL function  Description: INTERVENTIONS:  -  Assess patient's ability to carry out ADLs; assess patient's baseline for ADL function and identify physical deficits which impact ability to perform ADLs (bathing, care of mouth/teeth, toileting, grooming, dressing, etc.)  - Assess/evaluate cause of self-care deficits   - Assess range of motion  - Assess patient's mobility; develop plan if impaired  - Assess patient's need for assistive devices and provide as appropriate  - Encourage maximum independence but intervene and supervise when necessary  - Involve family in performance of ADLs  - Assess for home care needs following discharge   - Consider OT consult to assist with ADL evaluation and planning for discharge  - Provide patient education as appropriate  Outcome: Progressing     Problem: Prexisting or High Potential for Compromised Skin Integrity  Goal: Skin integrity is maintained or improved  Description: INTERVENTIONS:  - Identify patients at risk for skin breakdown  - Assess and monitor skin integrity  - Assess and monitor nutrition and hydration status  - Monitor labs   - Assess for incontinence   - Turn and reposition patient  - Assist with mobility/ambulation  - Relieve pressure over bony prominences  - Avoid friction and shearing  - Provide appropriate hygiene as needed including keeping skin clean and dry  - Evaluate need for skin moisturizer/barrier cream  - Collaborate with interdisciplinary team   - Patient/family teaching  - Consider wound care consult   Outcome: Progressing     Problem: PAIN - ADULT  Goal: Verbalizes/displays adequate comfort level or baseline comfort level  Description: Interventions:  - Encourage patient to monitor pain and request assistance  - Assess pain using appropriate pain scale  - Administer analgesics based on type and severity of pain and evaluate response  - Implement non-pharmacological measures as appropriate and evaluate response  - Consider cultural and social influences on pain and pain management  - Notify physician/advanced practitioner if interventions unsuccessful or patient reports new pain  Outcome: Progressing     Problem: INFECTION - ADULT  Goal: Absence or prevention of progression during hospitalization  Description: INTERVENTIONS:  - Assess and monitor for signs and symptoms of infection  - Monitor lab/diagnostic results  - Monitor all insertion sites, i.e. indwelling lines, tubes, and drains  - Monitor endotracheal if appropriate and nasal secretions for changes in amount and color  - [place] appropriate cooling/warming therapies per order  - Administer medications as ordered  - Instruct and encourage patient and family to use good hand hygiene technique  - Identify and instruct in appropriate isolation precautions for identified infection/condition  Outcome: Progressing

## 2023-11-13 NOTE — UTILIZATION REVIEW
Date: 11/13    Day 3: Has surpassed a 2nd midnight with active treatments and services, which include cont to wean O2 . Some confusion yest . Today oriented to self and place. On iv diuretics . Hold lyrica for encephalopathy . 11/12 IM Note   Abd pain unclear etiology , cont bentyl . CT chest w/ sm daphne pleural effusions , consistent w/ pulm venous congestion . Off O2 at this time . + abd tenderness . 11/13 Quick Note   patient very somnolent, difficult to arouse even with sternal rub. O2 sat 96 % on 2l . Glucose 148 , lytes wnl . Check VBG despite SPO2 stable , obtain CT head . 11/13 CT head   No acute intracranial hemorrhage or acute territorial infarction. Likely chronic lacunar infarctions in the bilateral basal ganglia, however, no prior study available to assess stability of this appearance.  If there is concern for acute ischemia, recommend MRI

## 2023-11-14 VITALS
BODY MASS INDEX: 17.42 KG/M2 | HEIGHT: 63 IN | WEIGHT: 98.33 LBS | DIASTOLIC BLOOD PRESSURE: 59 MMHG | SYSTOLIC BLOOD PRESSURE: 97 MMHG | RESPIRATION RATE: 16 BRPM | TEMPERATURE: 98.9 F | HEART RATE: 82 BPM | OXYGEN SATURATION: 93 %

## 2023-11-14 PROBLEM — G93.40 ACUTE ENCEPHALOPATHY: Status: RESOLVED | Noted: 2023-11-13 | Resolved: 2023-11-14

## 2023-11-14 PROBLEM — E44.0 MODERATE PROTEIN-CALORIE MALNUTRITION (HCC): Status: ACTIVE | Noted: 2023-11-14

## 2023-11-14 LAB
ALBUMIN SERPL BCP-MCNC: 3 G/DL (ref 3.5–5)
ALP SERPL-CCNC: 80 U/L (ref 34–104)
ALT SERPL W P-5'-P-CCNC: 5 U/L (ref 7–52)
ANION GAP SERPL CALCULATED.3IONS-SCNC: 5 MMOL/L
AST SERPL W P-5'-P-CCNC: 13 U/L (ref 13–39)
BACTERIA UR CULT: NORMAL
BASOPHILS # BLD AUTO: 0.02 THOUSANDS/ÂΜL (ref 0–0.1)
BASOPHILS NFR BLD AUTO: 0 % (ref 0–1)
BILIRUB SERPL-MCNC: 0.22 MG/DL (ref 0.2–1)
BUN SERPL-MCNC: 11 MG/DL (ref 5–25)
CALCIUM ALBUM COR SERPL-MCNC: 10 MG/DL (ref 8.3–10.1)
CALCIUM SERPL-MCNC: 9.2 MG/DL (ref 8.4–10.2)
CHLORIDE SERPL-SCNC: 103 MMOL/L (ref 96–108)
CO2 SERPL-SCNC: 33 MMOL/L (ref 21–32)
CREAT SERPL-MCNC: 0.54 MG/DL (ref 0.6–1.3)
EOSINOPHIL # BLD AUTO: 0.31 THOUSAND/ÂΜL (ref 0–0.61)
EOSINOPHIL NFR BLD AUTO: 5 % (ref 0–6)
ERYTHROCYTE [DISTWIDTH] IN BLOOD BY AUTOMATED COUNT: 13.2 % (ref 11.6–15.1)
GFR SERPL CREATININE-BSD FRML MDRD: 97 ML/MIN/1.73SQ M
GLUCOSE SERPL-MCNC: 116 MG/DL (ref 65–140)
HCT VFR BLD AUTO: 35.2 % (ref 34.8–46.1)
HGB BLD-MCNC: 10.6 G/DL (ref 11.5–15.4)
IMM GRANULOCYTES # BLD AUTO: 0.01 THOUSAND/UL (ref 0–0.2)
IMM GRANULOCYTES NFR BLD AUTO: 0 % (ref 0–2)
LYMPHOCYTES # BLD AUTO: 0.7 THOUSANDS/ÂΜL (ref 0.6–4.47)
LYMPHOCYTES NFR BLD AUTO: 12 % (ref 14–44)
MCH RBC QN AUTO: 29.5 PG (ref 26.8–34.3)
MCHC RBC AUTO-ENTMCNC: 30.1 G/DL (ref 31.4–37.4)
MCV RBC AUTO: 98 FL (ref 82–98)
MONOCYTES # BLD AUTO: 0.44 THOUSAND/ÂΜL (ref 0.17–1.22)
MONOCYTES NFR BLD AUTO: 7 % (ref 4–12)
NEUTROPHILS # BLD AUTO: 4.58 THOUSANDS/ÂΜL (ref 1.85–7.62)
NEUTS SEG NFR BLD AUTO: 76 % (ref 43–75)
NRBC BLD AUTO-RTO: 0 /100 WBCS
PLATELET # BLD AUTO: 231 THOUSANDS/UL (ref 149–390)
PMV BLD AUTO: 11.2 FL (ref 8.9–12.7)
POTASSIUM SERPL-SCNC: 4.5 MMOL/L (ref 3.5–5.3)
PROT SERPL-MCNC: 6.4 G/DL (ref 6.4–8.4)
RBC # BLD AUTO: 3.59 MILLION/UL (ref 3.81–5.12)
SODIUM SERPL-SCNC: 141 MMOL/L (ref 135–147)
WBC # BLD AUTO: 6.06 THOUSAND/UL (ref 4.31–10.16)

## 2023-11-14 PROCEDURE — 85025 COMPLETE CBC W/AUTO DIFF WBC: CPT | Performed by: INTERNAL MEDICINE

## 2023-11-14 PROCEDURE — 99239 HOSP IP/OBS DSCHRG MGMT >30: CPT | Performed by: PHYSICIAN ASSISTANT

## 2023-11-14 PROCEDURE — 80053 COMPREHEN METABOLIC PANEL: CPT | Performed by: INTERNAL MEDICINE

## 2023-11-14 RX ORDER — BISACODYL 10 MG
10 SUPPOSITORY, RECTAL RECTAL DAILY PRN
Status: DISCONTINUED | OUTPATIENT
Start: 2023-11-14 | End: 2023-11-14 | Stop reason: HOSPADM

## 2023-11-14 RX ORDER — MAGNESIUM CARB/ALUMINUM HYDROX 105-160MG
296 TABLET,CHEWABLE ORAL ONCE
Status: COMPLETED | OUTPATIENT
Start: 2023-11-14 | End: 2023-11-14

## 2023-11-14 RX ORDER — POLYETHYLENE GLYCOL 3350 17 G/17G
17 POWDER, FOR SOLUTION ORAL DAILY
Refills: 0 | Status: ON HOLD
Start: 2023-11-14

## 2023-11-14 RX ORDER — SENNA AND DOCUSATE SODIUM 50; 8.6 MG/1; MG/1
2 TABLET, FILM COATED ORAL 2 TIMES DAILY
Qty: 7 TABLET | Refills: 0 | Status: ON HOLD
Start: 2023-11-14

## 2023-11-14 RX ADMIN — PREDNISONE 5 MG: 5 TABLET ORAL at 08:16

## 2023-11-14 RX ADMIN — DICYCLOMINE HYDROCHLORIDE 10 MG: 10 CAPSULE ORAL at 05:20

## 2023-11-14 RX ADMIN — DOCUSATE SODIUM 100 MG: 100 CAPSULE, LIQUID FILLED ORAL at 17:27

## 2023-11-14 RX ADMIN — HEPARIN SODIUM 5000 UNITS: 5000 INJECTION INTRAVENOUS; SUBCUTANEOUS at 05:19

## 2023-11-14 RX ADMIN — DOCUSATE SODIUM 100 MG: 100 CAPSULE, LIQUID FILLED ORAL at 08:16

## 2023-11-14 RX ADMIN — POLYETHYLENE GLYCOL 3350 17 G: 17 POWDER, FOR SOLUTION ORAL at 08:16

## 2023-11-14 RX ADMIN — SENNOSIDES 8.6 MG: 8.6 TABLET, FILM COATED ORAL at 08:16

## 2023-11-14 RX ADMIN — DICYCLOMINE HYDROCHLORIDE 10 MG: 10 CAPSULE ORAL at 17:27

## 2023-11-14 RX ADMIN — BE HEALTH MAGNESIUM CITRATE ORAL SOLUTION - LEMON 296 ML: 1.75 LIQUID ORAL at 14:49

## 2023-11-14 RX ADMIN — DICYCLOMINE HYDROCHLORIDE 10 MG: 10 CAPSULE ORAL at 12:27

## 2023-11-14 RX ADMIN — NYSTATIN: 100000 POWDER TOPICAL at 08:17

## 2023-11-14 RX ADMIN — POLYETHYLENE GLYCOL 3350 17 G: 17 POWDER, FOR SOLUTION ORAL at 17:27

## 2023-11-14 RX ADMIN — PREDNISONE 5 MG: 5 TABLET ORAL at 17:27

## 2023-11-14 NOTE — ASSESSMENT & PLAN NOTE
Patient allegedly had some confusion and disorientation in the night of 11/12/2023. Currently AAOx2 (self and place but not time). Neurological exam is nonfocal.  CT head with " No acute intracranial hemorrhage or acute territorial infarction. Likely chronic lacunar infarctions in the bilateral basal ganglia, however, no prior study available to assess stability of this appearance. "  Recent TSH normal 3.7  VBG with normal pH and no CO2 retention  Suspect metabolic component, potentially related to alkalosis due to Lasix, also potentially worsened by ongoing use of Lyrica in the setting of diuresis it may have accumulated and caused encephalopathy.     Hold Lyrica for now  Check B12, ammonia  If she develops any focality on exam consider MRI

## 2023-11-14 NOTE — PLAN OF CARE
Problem: MOBILITY - ADULT  Goal: Maintain or return to baseline ADL function  Description: INTERVENTIONS:  -  Assess patient's ability to carry out ADLs; assess patient's baseline for ADL function and identify physical deficits which impact ability to perform ADLs (bathing, care of mouth/teeth, toileting, grooming, dressing, etc.)  - Assess/evaluate cause of self-care deficits   - Assess range of motion  - Assess patient's mobility; develop plan if impaired  - Assess patient's need for assistive devices and provide as appropriate  - Encourage maximum independence but intervene and supervise when necessary  - Involve family in performance of ADLs  - Assess for home care needs following discharge   - Consider OT consult to assist with ADL evaluation and planning for discharge  - Provide patient education as appropriate  11/14/2023 1640 by Ale Salmon RN  Outcome: Adequate for Discharge  11/14/2023 1055 by Ale Salmon RN  Outcome: Progressing  Goal: Maintains/Returns to pre admission functional level  Description: INTERVENTIONS:  - Perform BMAT or MOVE assessment daily.   - Set and communicate daily mobility goal to care team and patient/family/caregiver. - Collaborate with rehabilitation services on mobility goals if consulted  - Perform Range of Motion times a day. - Reposition patient every  hours.   - Dangle patient  times a day  - Stand patient  times a day  - Ambulate patient  times a day  - Out of bed to chair  times a day   - Out of bed for meals  times a day  - Out of bed for toileting  - Record patient progress and toleration of activity level   11/14/2023 1640 by Ale Salmon RN  Outcome: Adequate for Discharge  11/14/2023 1055 by Ale aSlmon RN  Outcome: Progressing     Problem: Prexisting or High Potential for Compromised Skin Integrity  Goal: Skin integrity is maintained or improved  Description: INTERVENTIONS:  - Identify patients at risk for skin breakdown  - Assess and monitor skin integrity  - Assess and monitor nutrition and hydration status  - Monitor labs   - Assess for incontinence   - Turn and reposition patient  - Assist with mobility/ambulation  - Relieve pressure over bony prominences  - Avoid friction and shearing  - Provide appropriate hygiene as needed including keeping skin clean and dry  - Evaluate need for skin moisturizer/barrier cream  - Collaborate with interdisciplinary team   - Patient/family teaching  - Consider wound care consult   11/14/2023 1640 by Gloria Cutler RN  Outcome: Adequate for Discharge  11/14/2023 1055 by Gloria Cutler RN  Outcome: Progressing     Problem: PAIN - ADULT  Goal: Verbalizes/displays adequate comfort level or baseline comfort level  Description: Interventions:  - Encourage patient to monitor pain and request assistance  - Assess pain using appropriate pain scale  - Administer analgesics based on type and severity of pain and evaluate response  - Implement non-pharmacological measures as appropriate and evaluate response  - Consider cultural and social influences on pain and pain management  - Notify physician/advanced practitioner if interventions unsuccessful or patient reports new pain  11/14/2023 1640 by Gloria Cutler RN  Outcome: Adequate for Discharge  11/14/2023 1055 by Gloria Cutler RN  Outcome: Progressing     Problem: INFECTION - ADULT  Goal: Absence or prevention of progression during hospitalization  Description: INTERVENTIONS:  - Assess and monitor for signs and symptoms of infection  - Monitor lab/diagnostic results  - Monitor all insertion sites, i.e. indwelling lines, tubes, and drains  - Monitor endotracheal if appropriate and nasal secretions for changes in amount and color  - [place] appropriate cooling/warming therapies per order  - Administer medications as ordered  - Instruct and encourage patient and family to use good hand hygiene technique  - Identify and instruct in appropriate isolation precautions for identified infection/condition  11/14/2023 1640 by Fatimah Curry RN  Outcome: Adequate for Discharge  11/14/2023 1055 by Fatimah Curry RN  Outcome: Progressing  Goal: Absence of fever/infection during neutropenic period  Description: INTERVENTIONS:  - Monitor WBC    11/14/2023 1640 by Fatimah Curry RN  Outcome: Adequate for Discharge  11/14/2023 1055 by Fatimah Curry RN  Outcome: Progressing     Problem: Nutrition/Hydration-ADULT  Goal: Nutrient/Hydration intake appropriate for improving, restoring or maintaining nutritional needs  Description: Monitor and assess patient's nutrition/hydration status for malnutrition. Collaborate with interdisciplinary team and initiate plan and interventions as ordered. Monitor patient's weight and dietary intake as ordered or per policy. Utilize nutrition screening tool and intervene as necessary. Determine patient's food preferences and provide high-protein, high-caloric foods as appropriate.      INTERVENTIONS:  - Monitor oral intake, urinary output, labs, and treatment plans  - Assess nutrition and hydration status and recommend course of action  - Evaluate amount of meals eaten  - Assist patient with eating if necessary   - Allow adequate time for meals  - Recommend/ encourage appropriate diets, oral nutritional supplements, and vitamin/mineral supplements  - Order, calculate, and assess calorie counts as needed  - Recommend, monitor, and adjust tube feedings and TPN/PPN based on assessed needs  - Assess need for intravenous fluids  - Provide specific nutrition/hydration education as appropriate  - Include patient/family/caregiver in decisions related to nutrition  11/14/2023 1640 by Fatimah Curry RN  Outcome: Adequate for Discharge  11/14/2023 1055 by Fatimah Curry RN  Outcome: Progressing

## 2023-11-14 NOTE — PLAN OF CARE
Problem: MOBILITY - ADULT  Goal: Maintain or return to baseline ADL function  Description: INTERVENTIONS:  -  Assess patient's ability to carry out ADLs; assess patient's baseline for ADL function and identify physical deficits which impact ability to perform ADLs (bathing, care of mouth/teeth, toileting, grooming, dressing, etc.)  - Assess/evaluate cause of self-care deficits   - Assess range of motion  - Assess patient's mobility; develop plan if impaired  - Assess patient's need for assistive devices and provide as appropriate  - Encourage maximum independence but intervene and supervise when necessary  - Involve family in performance of ADLs  - Assess for home care needs following discharge   - Consider OT consult to assist with ADL evaluation and planning for discharge  - Provide patient education as appropriate  Outcome: Progressing  Goal: Maintains/Returns to pre admission functional level  Description: INTERVENTIONS:  - Perform BMAT or MOVE assessment daily.   - Set and communicate daily mobility goal to care team and patient/family/caregiver. - Collaborate with rehabilitation services on mobility goals if consulted  - Perform Range of Motion 3 times a day. - Reposition patient every 2 hours.   - Dangle patient 3 times a day  - Stand patient 3 times a day  - Ambulate patient 3 times a day  - Out of bed to chair 3 times a day   - Out of bed for meals 3 times a day  - Out of bed for toileting  - Record patient progress and toleration of activity level   Outcome: Progressing     Problem: Prexisting or High Potential for Compromised Skin Integrity  Goal: Skin integrity is maintained or improved  Description: INTERVENTIONS:  - Identify patients at risk for skin breakdown  - Assess and monitor skin integrity  - Assess and monitor nutrition and hydration status  - Monitor labs   - Assess for incontinence   - Turn and reposition patient  - Assist with mobility/ambulation  - Relieve pressure over bony prominences  - Avoid friction and shearing  - Provide appropriate hygiene as needed including keeping skin clean and dry  - Evaluate need for skin moisturizer/barrier cream  - Collaborate with interdisciplinary team   - Patient/family teaching  - Consider wound care consult   Outcome: Progressing     Problem: PAIN - ADULT  Goal: Verbalizes/displays adequate comfort level or baseline comfort level  Description: Interventions:  - Encourage patient to monitor pain and request assistance  - Assess pain using appropriate pain scale  - Administer analgesics based on type and severity of pain and evaluate response  - Implement non-pharmacological measures as appropriate and evaluate response  - Consider cultural and social influences on pain and pain management  - Notify physician/advanced practitioner if interventions unsuccessful or patient reports new pain  Outcome: Progressing     Problem: INFECTION - ADULT  Goal: Absence or prevention of progression during hospitalization  Description: INTERVENTIONS:  - Assess and monitor for signs and symptoms of infection  - Monitor lab/diagnostic results  - Monitor all insertion sites, i.e. indwelling lines, tubes, and drains  - Monitor endotracheal if appropriate and nasal secretions for changes in amount and color  - [place] appropriate cooling/warming therapies per order  - Administer medications as ordered  - Instruct and encourage patient and family to use good hand hygiene technique  - Identify and instruct in appropriate isolation precautions for identified infection/condition  Outcome: Progressing  Goal: Absence of fever/infection during neutropenic period  Description: INTERVENTIONS:  - Monitor WBC    Outcome: Progressing     Problem: Nutrition/Hydration-ADULT  Goal: Nutrient/Hydration intake appropriate for improving, restoring or maintaining nutritional needs  Description: Monitor and assess patient's nutrition/hydration status for malnutrition.  Collaborate with interdisciplinary team and initiate plan and interventions as ordered. Monitor patient's weight and dietary intake as ordered or per policy. Utilize nutrition screening tool and intervene as necessary. Determine patient's food preferences and provide high-protein, high-caloric foods as appropriate.      INTERVENTIONS:  - Monitor oral intake, urinary output, labs, and treatment plans  - Assess nutrition and hydration status and recommend course of action  - Evaluate amount of meals eaten  - Assist patient with eating if necessary   - Allow adequate time for meals  - Recommend/ encourage appropriate diets, oral nutritional supplements, and vitamin/mineral supplements  - Order, calculate, and assess calorie counts as needed  - Recommend, monitor, and adjust tube feedings and TPN/PPN based on assessed needs  - Assess need for intravenous fluids  - Provide specific nutrition/hydration education as appropriate  - Include patient/family/caregiver in decisions related to nutrition  Outcome: Progressing

## 2023-11-14 NOTE — ASSESSMENT & PLAN NOTE
Initial presentation to the ED on 11/7 with x-ray revealing dense opacity suspicious for consolidation versus mass  On this presentation status CT chest revealing: Diffuse reticular changes in the lungs; this finding in conjunction with small bilateral pleural effusions is consistent with pulmonary venous congestion. Superimposed nodular and groundglass opacities predominantly in the upper lobes noted, suspicious for superimposed infection. No clinical sign of acute infectious process other than minimal leukocytosis which may be steroid-induced. In addition to low procalcitonin  No clinical sign of CHF decompensation, low BNP but given her symptoms she is now status post Lasix trial with partial improvement in breathing - now looks dry  COVID/influenza/RSV ruled out  Underlying pulmonary sarcoidosis - needs outpatient pulmonology referral  Patient with underlying history of rheumatoid arthritis, was previously on methotrexate which was discontinued more than a year ago.   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

## 2023-11-14 NOTE — DISCHARGE SUMMARY
[address]  Discharge- Maryuri Hicks 1955, 76 y.o. female MRN: [id number]  Unit/Bed#: -01 Encounter: [id number]  Primary Care Provider: No primary care provider on file. Date and time admitted to hospital: 11/11/2023  9:32 PM    * Abdominal pain  Assessment & Plan  Ongoing abdominal pain on occasion, unclear etiology to date; suspect adhesions leading to neuropathic pain along with constipation - no BM since admission  Status post CT scan of abdomen pelvis with no acute intra-abdominal pathology  UA is benign. Lactic acid negative, low suspicion for ischemic colitis  Patient chronically on steroids but region of pain not consistent with gastritis and she denies any alarm symptoms  Continue Bentyl, aggressive bowel regimen  Discussed outpatient GI or surgery follow up should problem persist     Acute encephalopathy-resolved as of 11/14/2023  Assessment & Plan  Patient allegedly had some confusion and disorientation in the night of 11/12/2023. Currently AAOx2 (self and place but not time). Neurological exam is nonfocal.  CT head with " No acute intracranial hemorrhage or acute territorial infarction. Likely chronic lacunar infarctions in the bilateral basal ganglia, however, no prior study available to assess stability of this appearance. "  Recent TSH normal 3.7  VBG with normal pH and no CO2 retention  Suspect metabolic component, potentially related to alkalosis due to Lasix, also potentially worsened by ongoing use of Lyrica in the setting of diuresis it may have accumulated and caused encephalopathy.     Hold Lyrica for now  Check B12, ammonia  If she develops any focality on exam consider MRI      Respiratory insufficiency  Assessment & Plan  Initial presentation to the ED on 11/7 with x-ray revealing dense opacity suspicious for consolidation versus mass  On this presentation status CT chest revealing: Diffuse reticular changes in the lungs; this finding in conjunction with small bilateral pleural effusions is consistent with pulmonary venous congestion. Superimposed nodular and groundglass opacities predominantly in the upper lobes noted, suspicious for superimposed infection. No clinical sign of acute infectious process other than minimal leukocytosis which may be steroid-induced. In addition to low procalcitonin  No clinical sign of CHF decompensation, low BNP but given her symptoms she is now status post Lasix trial with partial improvement in breathing - now looks dry  COVID/influenza/RSV ruled out  Underlying pulmonary sarcoidosis - needs outpatient pulmonology referral  Patient with underlying history of rheumatoid arthritis, was previously on methotrexate which was discontinued more than a year ago. 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)  Assessment & Plan  Incidental finding status post CT imaging. Also noted renal cyst  Repeat imaging in 1 year  OP follow up  Communicated to patient    Rheumatoid arthritis [facility])  Assessment & Plan  Chronically on prednisone  Continue Lyrica    Leukocytosis-resolved as of 11/13/2023  Assessment & Plan  Likely steroid-induced patient is chronically on prednisone      Medical Problems       Resolved Problems  Date Reviewed: 11/14/2023            Resolved    Leukocytosis 11/13/2023     Resolved by  Paul Spence PA-C    Acute encephalopathy 11/14/2023     Resolved by  Paul Spence PA-C        Discharging Physician / Practitioner: Paul Spence PA-C  PCP: No primary care provider on file.   Admission Date:   Admission Orders (From admission, onward)       Ordered        11/12/23 1017  Inpatient Admission  Once            11/12/23 0409  Place in Observation  Once            11/12/23 0233  INPATIENT ADMISSION  Once                          Discharge Date: 11/14/23    Consultations During Hospital Stay:  None     Procedures Performed:   None     Significant Findings / Test Results: CT head wo contrast   Final Result by Ana Maria Espana DO (11/13 0145)      No acute intracranial hemorrhage or acute territorial infarction. Likely chronic lacunar infarctions in the bilateral basal ganglia, however, no prior study available to assess stability of this appearance. If there is concern for acute ischemia,    recommend MRI. Workstation performed: [id number]         CT chest abdomen pelvis w contrast   Final Result by Jared Chang DO (11/12 0159)      Diffuse reticular changes in the lungs; this finding in conjunction with small bilateral pleural effusions is consistent with pulmonary venous congestion. Superimposed nodular and groundglass opacities predominantly in the upper lobes noted, suspicious    for superimposed infection. 1.4 cm right adrenal nodule again seen. 12-month follow-up CT is recommended to ensure stability. Biochemical evaluation is also suggested to rule out functioning adenoma if not previously performed. This study was marked for "Immediate" notification in EPIC. Workstation performed: [id number]         XR chest 1 view portable   ED Interpretation by Carley Galarza DO (11/11 3083)   Worsening patchy density in the right midlung and possibly the right lung base. Small right pleural effusion and trace left pleural effusion. Final Result by Miranda Perez MD ([id number])      Slightly increased interstitial markings with small right effusion. See subsequent chest CT. Workstation performed: [id number]              Incidental Findings:   As above    I reviewed the above mentioned incidental findings with the patient and/or family and they expressed understanding. Test Results Pending at Discharge (will require follow up):    None      Outpatient Tests Requested:  Pulmonology follow up  GI vs surgery follow up     Complications:  none     Reason for Admission: abdominal pain     Hospital Course:   Pantera Whelan is a 76 y.o. female patient who originally presented to the hospital on 11/11/2023 due to abdominal pain x 3 weeks, with intermittent nausea and vomiting/diarrhea and mild hypoxia. Extensive workup has been unrevealing for etiology. Suspect constipation and neuropathic pain secondary to scar +/- possibility of adhesions. Provided aggressive bowel regimen instructions and discharge to home. She does have underlying autoimmune issues and needs a pulmonologist, for which referral was provided. Please see above list of diagnoses and related plan for additional information. Condition at Discharge: stable    Discharge Day Visit / Exam:   Subjective:  patient reports pain there presently, sometimes not there. Points to the vertical scar on her abdomen, states that is the only place it has been. She has not had a BM in days, no nausea. Vitals: Blood Pressure: 93/52 (11/14/23 0733)  Pulse: 67 (11/14/23 0733)  Temperature: (!) 97.4 °F (36.3 °C) (11/14/23 0733)  Temp Source: Oral (11/11/23 2143)  Respirations: 14 (11/14/23 0733)  Height: 5' 3" (160 cm) (11/12/23 0310)  Weight - Scale: 44.6 kg (98 lb 5.2 oz) (11/12/23 0310)  SpO2: 96 % (11/14/23 0733)  Exam:   Physical Exam  Vitals and nursing note reviewed. Constitutional:       Appearance: Normal appearance. She is not ill-appearing or toxic-appearing. Cardiovascular:      Rate and Rhythm: Normal rate and regular rhythm. Heart sounds: Normal heart sounds. Pulmonary:      Effort: Pulmonary effort is normal. No respiratory distress. Breath sounds: Normal breath sounds. No wheezing. Abdominal:      General: Bowel sounds are normal. There is no distension. Palpations: Abdomen is soft. Tenderness: There is abdominal tenderness (near vertical scar pubis symphisis to umbillicus). Skin:     General: Skin is warm and dry. Coloration: Skin is not pale. Neurological:      Mental Status: She is alert. Mental status is at baseline. Psychiatric:         Mood and Affect: Mood normal.         Behavior: Behavior normal.           Discussion with Family: Updated  (caregiver, Halima Donaldson) via phone. Discharge instructions/Information to patient and family:   See after visit summary for information provided to patient and family. Provisions for Follow-Up Care:  See after visit summary for information related to follow-up care and any pertinent home health orders. Mobility at time of Discharge:   Basic Mobility Inpatient Raw Score: 18  JH-HLM Goal: 6: Walk 10 steps or more  JH-HLM Achieved: 5: Stand (1 or more minutes)  HLM Goal NOT achieved. Continue to encourage mobility in post discharge setting. Disposition:   Home with [facility] Services (Reminder: Complete face to face encounter)    Planned Readmission: no     Discharge Statement:  I spent 65 minutes discharging the patient. This time was spent on the day of discharge. I had direct contact with the patient on the day of discharge. Greater than 50% of the total time was spent examining patient, answering all patient questions, arranging and discussing plan of care with patient as well as directly providing post-discharge instructions. Additional time then spent on discharge activities. Discharge Medications:  See after visit summary for reconciled discharge medications provided to patient and/or family.       **Please Note: This note may have been constructed using a voice recognition system**

## 2023-11-14 NOTE — ASSESSMENT & PLAN NOTE
Ongoing abdominal pain on occasion, unclear etiology to date; suspect adhesions leading to neuropathic pain along with constipation - no BM since admission  Status post CT scan of abdomen pelvis with no acute intra-abdominal pathology  UA is benign.  Lactic acid negative, low suspicion for ischemic colitis  Patient chronically on steroids but region of pain not consistent with gastritis and she denies any alarm symptoms  Continue Bentyl, aggressive bowel regimen  Discussed outpatient GI or surgery follow up should problem persist

## 2023-11-14 NOTE — PLAN OF CARE
Problem: PAIN - ADULT  Goal: Verbalizes/displays adequate comfort level or baseline comfort level  Description: Interventions:  - Encourage patient to monitor pain and request assistance  - Assess pain using appropriate pain scale  - Administer analgesics based on type and severity of pain and evaluate response  - Implement non-pharmacological measures as appropriate and evaluate response  - Consider cultural and social influences on pain and pain management  - Notify physician/advanced practitioner if interventions unsuccessful or patient reports new pain  Outcome: Progressing     Problem: INFECTION - ADULT  Goal: Absence or prevention of progression during hospitalization  Description: INTERVENTIONS:  - Assess and monitor for signs and symptoms of infection  - Monitor lab/diagnostic results  - Monitor all insertion sites, i.e. indwelling lines, tubes, and drains  - Monitor endotracheal if appropriate and nasal secretions for changes in amount and color  - [place] appropriate cooling/warming therapies per order  - Administer medications as ordered  - Instruct and encourage patient and family to use good hand hygiene technique  - Identify and instruct in appropriate isolation precautions for identified infection/condition  Outcome: Progressing  Goal: Absence of fever/infection during neutropenic period  Description: INTERVENTIONS:  - Monitor WBC    Outcome: Progressing     Problem: Nutrition/Hydration-ADULT  Goal: Nutrient/Hydration intake appropriate for improving, restoring or maintaining nutritional needs  Description: Monitor and assess patient's nutrition/hydration status for malnutrition. Collaborate with interdisciplinary team and initiate plan and interventions as ordered. Monitor patient's weight and dietary intake as ordered or per policy. Utilize nutrition screening tool and intervene as necessary. Determine patient's food preferences and provide high-protein, high-caloric foods as appropriate. INTERVENTIONS:  - Monitor oral intake, urinary output, labs, and treatment plans  - Assess nutrition and hydration status and recommend course of action  - Evaluate amount of meals eaten  - Assist patient with eating if necessary   - Allow adequate time for meals  - Recommend/ encourage appropriate diets, oral nutritional supplements, and vitamin/mineral supplements  - Order, calculate, and assess calorie counts as needed  - Recommend, monitor, and adjust tube feedings and TPN/PPN based on assessed needs  - Assess need for intravenous fluids  - Provide specific nutrition/hydration education as appropriate  - Include patient/family/caregiver in decisions related to nutrition  Outcome: Progressing

## 2023-11-17 ENCOUNTER — HOSPITAL ENCOUNTER (INPATIENT)
Facility: HOSPITAL | Age: 68
LOS: 12 days | Discharge: NURSING, REHAB OR LONG-TERM CARE | DRG: 546 | End: 2023-11-30
Attending: EMERGENCY MEDICINE | Admitting: STUDENT IN AN ORGANIZED HEALTH CARE EDUCATION/TRAINING PROGRAM
Payer: COMMERCIAL

## 2023-11-17 ENCOUNTER — APPOINTMENT (EMERGENCY)
Dept: CT IMAGING | Facility: HOSPITAL | Age: 68
DRG: 546 | End: 2023-11-17
Payer: COMMERCIAL

## 2023-11-17 DIAGNOSIS — R10.84 GENERALIZED ABDOMINAL PAIN: ICD-10-CM

## 2023-11-17 DIAGNOSIS — J90 PLEURAL EFFUSION: ICD-10-CM

## 2023-11-17 DIAGNOSIS — J90 PLEURAL EFFUSION ON RIGHT: Primary | ICD-10-CM

## 2023-11-17 LAB
ALBUMIN SERPL BCP-MCNC: 3.3 G/DL (ref 3.5–5)
ALP SERPL-CCNC: 102 U/L (ref 34–104)
ALT SERPL W P-5'-P-CCNC: 9 U/L (ref 7–52)
AMPHETAMINES SERPL QL SCN: NEGATIVE
ANION GAP SERPL CALCULATED.3IONS-SCNC: 8 MMOL/L
APAP SERPL-MCNC: <2 UG/ML (ref 10–20)
AST SERPL W P-5'-P-CCNC: 23 U/L (ref 13–39)
ATRIAL RATE: 92 BPM
BACTERIA BLD CULT: NORMAL
BACTERIA BLD CULT: NORMAL
BACTERIA UR QL AUTO: ABNORMAL /HPF
BARBITURATES UR QL: NEGATIVE
BASOPHILS # BLD AUTO: 0.03 THOUSANDS/ÂΜL (ref 0–0.1)
BASOPHILS NFR BLD AUTO: 0 % (ref 0–1)
BENZODIAZ UR QL: NEGATIVE
BILIRUB SERPL-MCNC: 0.6 MG/DL (ref 0.2–1)
BILIRUB UR QL STRIP: NEGATIVE
BNP SERPL-MCNC: 21 PG/ML (ref 0–100)
BUN SERPL-MCNC: 18 MG/DL (ref 5–25)
CALCIUM ALBUM COR SERPL-MCNC: 10 MG/DL (ref 8.3–10.1)
CALCIUM SERPL-MCNC: 9.4 MG/DL (ref 8.4–10.2)
CARDIAC TROPONIN I PNL SERPL HS: 3 NG/L
CHLORIDE SERPL-SCNC: 104 MMOL/L (ref 96–108)
CLARITY UR: CLEAR
CO2 SERPL-SCNC: 27 MMOL/L (ref 21–32)
COCAINE UR QL: NEGATIVE
COLOR UR: YELLOW
CREAT SERPL-MCNC: 0.59 MG/DL (ref 0.6–1.3)
EOSINOPHIL # BLD AUTO: 0.25 THOUSAND/ÂΜL (ref 0–0.61)
EOSINOPHIL NFR BLD AUTO: 3 % (ref 0–6)
ERYTHROCYTE [DISTWIDTH] IN BLOOD BY AUTOMATED COUNT: 14 % (ref 11.6–15.1)
ETHANOL SERPL-MCNC: <10 MG/DL
FLUAV RNA RESP QL NAA+PROBE: NEGATIVE
FLUBV RNA RESP QL NAA+PROBE: NEGATIVE
GFR SERPL CREATININE-BSD FRML MDRD: 94 ML/MIN/1.73SQ M
GLUCOSE SERPL-MCNC: 116 MG/DL (ref 65–140)
GLUCOSE UR STRIP-MCNC: NEGATIVE MG/DL
HCT VFR BLD AUTO: 41.5 % (ref 34.8–46.1)
HGB BLD-MCNC: 13.1 G/DL (ref 11.5–15.4)
HGB UR QL STRIP.AUTO: NEGATIVE
IMM GRANULOCYTES # BLD AUTO: 0.02 THOUSAND/UL (ref 0–0.2)
IMM GRANULOCYTES NFR BLD AUTO: 0 % (ref 0–2)
KETONES UR STRIP-MCNC: NEGATIVE MG/DL
LACTATE SERPL-SCNC: 1 MMOL/L (ref 0.5–2)
LEUKOCYTE ESTERASE UR QL STRIP: NEGATIVE
LIPASE SERPL-CCNC: 9 U/L (ref 11–82)
LYMPHOCYTES # BLD AUTO: 1.28 THOUSANDS/ÂΜL (ref 0.6–4.47)
LYMPHOCYTES NFR BLD AUTO: 16 % (ref 14–44)
MCH RBC QN AUTO: 29.8 PG (ref 26.8–34.3)
MCHC RBC AUTO-ENTMCNC: 31.6 G/DL (ref 31.4–37.4)
MCV RBC AUTO: 95 FL (ref 82–98)
METHADONE UR QL: NEGATIVE
MONOCYTES # BLD AUTO: 0.75 THOUSAND/ÂΜL (ref 0.17–1.22)
MONOCYTES NFR BLD AUTO: 9 % (ref 4–12)
MUCOUS THREADS UR QL AUTO: ABNORMAL
NEUTROPHILS # BLD AUTO: 5.69 THOUSANDS/ÂΜL (ref 1.85–7.62)
NEUTS SEG NFR BLD AUTO: 72 % (ref 43–75)
NITRITE UR QL STRIP: NEGATIVE
NON-SQ EPI CELLS URNS QL MICRO: ABNORMAL /HPF
NRBC BLD AUTO-RTO: 0 /100 WBCS
OPIATES UR QL SCN: NEGATIVE
OXYCODONE+OXYMORPHONE UR QL SCN: NEGATIVE
P AXIS: 63 DEGREES
PCP UR QL: NEGATIVE
PH UR STRIP.AUTO: 5.5 [PH]
PLATELET # BLD AUTO: 333 THOUSANDS/UL (ref 149–390)
PMV BLD AUTO: 10.5 FL (ref 8.9–12.7)
POTASSIUM SERPL-SCNC: 4.7 MMOL/L (ref 3.5–5.3)
PR INTERVAL: 150 MS
PROT SERPL-MCNC: 6.8 G/DL (ref 6.4–8.4)
PROT UR STRIP-MCNC: ABNORMAL MG/DL
QRS AXIS: 72 DEGREES
QRSD INTERVAL: 70 MS
QT INTERVAL: 360 MS
QTC INTERVAL: 445 MS
RBC # BLD AUTO: 4.39 MILLION/UL (ref 3.81–5.12)
RBC #/AREA URNS AUTO: ABNORMAL /HPF
RSV RNA RESP QL NAA+PROBE: NEGATIVE
SALICYLATES SERPL-MCNC: <5 MG/DL (ref 3–20)
SARS-COV-2 RNA RESP QL NAA+PROBE: NEGATIVE
SODIUM SERPL-SCNC: 139 MMOL/L (ref 135–147)
SP GR UR STRIP.AUTO: 1.02 (ref 1–1.03)
T WAVE AXIS: 38 DEGREES
THC UR QL: POSITIVE
UROBILINOGEN UR STRIP-ACNC: 2 MG/DL
VENTRICULAR RATE: 92 BPM
WBC # BLD AUTO: 8.02 THOUSAND/UL (ref 4.31–10.16)
WBC #/AREA URNS AUTO: ABNORMAL /HPF

## 2023-11-17 PROCEDURE — 96361 HYDRATE IV INFUSION ADD-ON: CPT

## 2023-11-17 PROCEDURE — 36415 COLL VENOUS BLD VENIPUNCTURE: CPT | Performed by: PHYSICIAN ASSISTANT

## 2023-11-17 PROCEDURE — 80307 DRUG TEST PRSMV CHEM ANLYZR: CPT | Performed by: PHYSICIAN ASSISTANT

## 2023-11-17 PROCEDURE — 93005 ELECTROCARDIOGRAM TRACING: CPT

## 2023-11-17 PROCEDURE — 93010 ELECTROCARDIOGRAM REPORT: CPT | Performed by: INTERNAL MEDICINE

## 2023-11-17 PROCEDURE — 96360 HYDRATION IV INFUSION INIT: CPT

## 2023-11-17 PROCEDURE — 80143 DRUG ASSAY ACETAMINOPHEN: CPT | Performed by: PHYSICIAN ASSISTANT

## 2023-11-17 PROCEDURE — 71260 CT THORAX DX C+: CPT

## 2023-11-17 PROCEDURE — 99285 EMERGENCY DEPT VISIT HI MDM: CPT | Performed by: PHYSICIAN ASSISTANT

## 2023-11-17 PROCEDURE — 85025 COMPLETE CBC W/AUTO DIFF WBC: CPT | Performed by: PHYSICIAN ASSISTANT

## 2023-11-17 PROCEDURE — 83690 ASSAY OF LIPASE: CPT | Performed by: PHYSICIAN ASSISTANT

## 2023-11-17 PROCEDURE — 99285 EMERGENCY DEPT VISIT HI MDM: CPT

## 2023-11-17 PROCEDURE — 80053 COMPREHEN METABOLIC PANEL: CPT | Performed by: PHYSICIAN ASSISTANT

## 2023-11-17 PROCEDURE — 0241U HB NFCT DS VIR RESP RNA 4 TRGT: CPT | Performed by: PHYSICIAN ASSISTANT

## 2023-11-17 PROCEDURE — 70450 CT HEAD/BRAIN W/O DYE: CPT

## 2023-11-17 PROCEDURE — 83880 ASSAY OF NATRIURETIC PEPTIDE: CPT | Performed by: PHYSICIAN ASSISTANT

## 2023-11-17 PROCEDURE — 84484 ASSAY OF TROPONIN QUANT: CPT | Performed by: PHYSICIAN ASSISTANT

## 2023-11-17 PROCEDURE — 74177 CT ABD & PELVIS W/CONTRAST: CPT

## 2023-11-17 PROCEDURE — 82077 ASSAY SPEC XCP UR&BREATH IA: CPT | Performed by: PHYSICIAN ASSISTANT

## 2023-11-17 PROCEDURE — 83605 ASSAY OF LACTIC ACID: CPT | Performed by: PHYSICIAN ASSISTANT

## 2023-11-17 PROCEDURE — 81001 URINALYSIS AUTO W/SCOPE: CPT | Performed by: PHYSICIAN ASSISTANT

## 2023-11-17 PROCEDURE — 1124F ACP DISCUSS-NO DSCNMKR DOCD: CPT | Performed by: INTERNAL MEDICINE

## 2023-11-17 PROCEDURE — 99223 1ST HOSP IP/OBS HIGH 75: CPT | Performed by: INTERNAL MEDICINE

## 2023-11-17 PROCEDURE — G1004 CDSM NDSC: HCPCS

## 2023-11-17 PROCEDURE — 80179 DRUG ASSAY SALICYLATE: CPT | Performed by: PHYSICIAN ASSISTANT

## 2023-11-17 RX ORDER — ACETAMINOPHEN 325 MG/1
650 TABLET ORAL EVERY 6 HOURS PRN
Status: DISCONTINUED | OUTPATIENT
Start: 2023-11-17 | End: 2023-11-30 | Stop reason: HOSPADM

## 2023-11-17 RX ORDER — ONDANSETRON 2 MG/ML
4 INJECTION INTRAMUSCULAR; INTRAVENOUS EVERY 6 HOURS PRN
Status: DISCONTINUED | OUTPATIENT
Start: 2023-11-17 | End: 2023-11-30 | Stop reason: HOSPADM

## 2023-11-17 RX ORDER — NICOTINE 21 MG/24HR
1 PATCH, TRANSDERMAL 24 HOURS TRANSDERMAL DAILY
Status: DISCONTINUED | OUTPATIENT
Start: 2023-11-18 | End: 2023-11-30 | Stop reason: HOSPADM

## 2023-11-17 RX ORDER — PREGABALIN 75 MG/1
150 CAPSULE ORAL 2 TIMES DAILY
Status: DISCONTINUED | OUTPATIENT
Start: 2023-11-17 | End: 2023-11-30 | Stop reason: HOSPADM

## 2023-11-17 RX ORDER — SODIUM CHLORIDE 9 MG/ML
75 INJECTION, SOLUTION INTRAVENOUS CONTINUOUS
Status: DISCONTINUED | OUTPATIENT
Start: 2023-11-17 | End: 2023-11-20

## 2023-11-17 RX ORDER — HEPARIN SODIUM 5000 [USP'U]/ML
5000 INJECTION, SOLUTION INTRAVENOUS; SUBCUTANEOUS EVERY 8 HOURS SCHEDULED
Status: DISCONTINUED | OUTPATIENT
Start: 2023-11-18 | End: 2023-11-30 | Stop reason: HOSPADM

## 2023-11-17 RX ORDER — PREDNISONE 5 MG/1
5 TABLET ORAL 2 TIMES DAILY WITH MEALS
Status: DISCONTINUED | OUTPATIENT
Start: 2023-11-18 | End: 2023-11-30 | Stop reason: HOSPADM

## 2023-11-17 RX ADMIN — SODIUM CHLORIDE 150 ML/HR: 0.9 INJECTION, SOLUTION INTRAVENOUS at 15:27

## 2023-11-17 RX ADMIN — IOHEXOL 100 ML: 350 INJECTION, SOLUTION INTRAVENOUS at 16:34

## 2023-11-17 RX ADMIN — PREGABALIN 150 MG: 75 CAPSULE ORAL at 19:59

## 2023-11-17 NOTE — ASSESSMENT & PLAN NOTE
Was incidentally found to have an increasing loculated effusion in the right middle and lower chest with slight rightward shift of heart and mediastinal structures  Possibly in setting of RA?   WBC count within normal limits, currently without any shortness of breath so unlikely pneumonia  Would benefit from thoracentesis and based on thoracentesis may need chest tube  We will consult pulmonology and IR for further evaluation

## 2023-11-17 NOTE — H&P
[address]  H&P  Name: Delroy Gao 76 y.o. female I MRN: [id number]  Unit/Bed#: ED 24 I Date of Admission: 11/17/2023   Date of Service: 11/17/2023 I Hospital Day: 0      Assessment/Plan   * Pleural effusion  Assessment & Plan  Was incidentally found to have an increasing loculated effusion in the right middle and lower chest with slight rightward shift of heart and mediastinal structures  Possibly in setting of RA? WBC count within normal limits, currently without any shortness of breath so unlikely pneumonia  Would benefit from thoracentesis and based on thoracentesis may need chest tube  We will consult pulmonology and IR for further evaluation     Rheumatoid arthritis [facility])  Assessment & Plan  Continue prednisone and Lyrica    Abdominal pain  Assessment & Plan  Presented with abdominal pain with associated nausea vomiting diarrhea  Suspect secondary to gastroenteritis  We will continue with conservative management for now  Advance diet as tolerated  Zofran as needed           VTE Prophylaxis: Heparin  / sequential compression device   Code Status: full code  POLST: There is no POLST form on file for this patient (pre-hospital)  Discussion with family: patient, declined family update    Anticipated Length of Stay:  Patient will be admitted on an Observation basis with an anticipated length of stay of  < 2 midnights. Justification for Hospital Stay: Pleural effusion    Total Time for Visit, including Counseling / Coordination of Care: 60 minutes. Greater than 50% of this total time spent on direct patient counseling and coordination of care. Chief Complaint:   Abdominal pain        History of Present Illness:    Delroy Gao is a 76 y.o. female with past medical history significant of rheumatoid arthritis who is present with abdominal pain nausea vomiting and diarrhea. Reports poor intake for the past 4 days. Reports diarrhea and nausea and vomiting that began yesterday. Otherwise denies any acute complaints. Denies chest pain, shortness of breath, fevers, chills, dysuria, headaches or any other complaints. Review of Systems:    Review of Systems   Constitutional:  Negative for activity change, appetite change, chills, diaphoresis, fever and unexpected weight change. HENT:  Negative for congestion, facial swelling and rhinorrhea. Eyes:  Negative for photophobia and visual disturbance. Respiratory:  Negative for cough, shortness of breath and wheezing. Cardiovascular:  Negative for chest pain and palpitations. Gastrointestinal:  Positive for abdominal pain, diarrhea and vomiting. Negative for blood in stool, constipation and nausea. Genitourinary:  Negative for decreased urine volume, difficulty urinating, dysuria, flank pain, frequency, hematuria and urgency. Musculoskeletal:  Negative for arthralgias, back pain, joint swelling and myalgias. Neurological:  Negative for dizziness, syncope, facial asymmetry, light-headedness, numbness and headaches. Psychiatric/Behavioral:  Negative for confusion and decreased concentration. The patient is not nervous/anxious. Past Medical and Surgical History:     History reviewed. No pertinent past medical history. History reviewed. No pertinent surgical history. Meds/Allergies:    Prior to Admission medications    Medication Sig Start Date End Date Taking?  Authorizing Provider   bisacodyl (FLEET) 10 MG/30ML ENEM Insert 30 mL (10 mg total) into the rectum daily as needed for constipation 11/14/23   Celeste Salinas PA-C   dicyclomine (BENTYL) 20 mg tablet Take 1 tablet (20 mg total) by mouth 2 (two) times a day 11/7/23   Casi Fonseca DO   ondansetron (Zofran ODT) 4 mg disintegrating tablet Take 1 tablet (4 mg total) by mouth every 6 (six) hours as needed for nausea or vomiting 11/7/23   Casi Fonseca DO   polyethylene glycol (MIRALAX) 17 g packet Take 17 g by mouth daily 11/14/23   Katelyn Garcia, SHYLA   predniSONE 5 mg tablet Take 5 mg by mouth 2 (two) times a day with meals    Historical Provider, MD   pregabalin (LYRICA) 75 mg capsule Take 150 mg by mouth 2 (two) times a day    Historical Provider, MD   senna-docusate sodium (SENOKOT-S) 8.6-50 mg per tablet Take 2 tablets by mouth 2 (two) times a day 11/14/23   Ariadne Patricia PA-C     I have reviewed home medications with patient personally. Allergies: Allergies   Allergen Reactions    Latex Hives    Aspirin GI Intolerance     Other reaction(s): Nausea/vomiting    Cephalexin GI Intolerance    Morphine GI Intolerance    Sulfamethoxazole-Trimethoprim GI Intolerance       Social History:     Marital Status:      Patient Pre-hospital Living Situation: home  Patient Pre-hospital Level of Mobility: indpeendent  Patient Pre-hospital Diet Restrictions: none  Substance Use History:   Social History     Substance and Sexual Activity   Alcohol Use Never     Social History     Tobacco Use   Smoking Status Every Day    Packs/day: 0.25    Types: Cigarettes   Smokeless Tobacco Never     Social History     Substance and Sexual Activity   Drug Use Never       Family History:    History reviewed. No pertinent family history. Physical Exam:     Vitals:   Blood Pressure: 105/71 (11/17/23 1425)  Pulse: 94 (11/17/23 1730)  Temperature: 98.5 °F (36.9 °C) (11/17/23 1327)  Temp Source: Oral (11/17/23 1327)  Respirations: 19 (11/17/23 1730)  Height: 5' 3" (160 cm) (11/17/23 1327)  Weight - Scale: 44.5 kg (98 lb) (11/17/23 1327)  SpO2: 92 % (11/17/23 1730)    Physical Exam  Constitutional:       General: She is not in acute distress. Appearance: She is well-developed. She is not diaphoretic. HENT:      Head: Normocephalic and atraumatic. Nose: Nose normal.      Mouth/Throat:      Pharynx: No oropharyngeal exudate. Eyes:      General: No scleral icterus. Right eye: No discharge. Left eye: No discharge.       Conjunctiva/sclera: Conjunctivae normal.   Neck:      Thyroid: No thyromegaly. Vascular: No JVD. Cardiovascular:      Rate and Rhythm: Normal rate and regular rhythm. Heart sounds: Normal heart sounds. No murmur heard. No friction rub. No gallop. Pulmonary:      Effort: Pulmonary effort is normal. No respiratory distress. Breath sounds: Normal breath sounds. No wheezing or rales. Chest:      Chest wall: No tenderness. Abdominal:      General: Bowel sounds are normal. There is no distension. Palpations: Abdomen is soft. Tenderness: There is no abdominal tenderness. There is no guarding or rebound. Musculoskeletal:         General: No tenderness or deformity. Normal range of motion. Cervical back: Normal range of motion and neck supple. Skin:     General: Skin is warm and dry. Findings: No erythema or rash. Neurological:      Mental Status: She is alert. Cranial Nerves: No cranial nerve deficit. Sensory: No sensory deficit. Motor: No abnormal muscle tone. Coordination: Coordination normal.             Additional Data:     Lab Results: I have personally reviewed pertinent reports.       Results from last 7 days   Lab Units 11/17/23  1517   WBC Thousand/uL 8.02   HEMOGLOBIN g/dL 13.1   HEMATOCRIT % 41.5   PLATELETS Thousands/uL 333   NEUTROS PCT % 72   LYMPHS PCT % 16   MONOS PCT % 9   EOS PCT % 3     Results from last 7 days   Lab Units 11/17/23  1517   SODIUM mmol/L 139   POTASSIUM mmol/L 4.7   CHLORIDE mmol/L 104   CO2 mmol/L 27   BUN mg/dL 18   CREATININE mg/dL 0.59*   ANION GAP mmol/L 8   CALCIUM mg/dL 9.4   ALBUMIN g/dL 3.3*   TOTAL BILIRUBIN mg/dL 0.60   ALK PHOS U/L 102   ALT U/L 9   AST U/L 23   GLUCOSE RANDOM mg/dL 116     Results from last 7 days   Lab Units 11/11/23  2306   INR  1.16     Results from last 7 days   Lab Units 11/13/23  0008   POC GLUCOSE mg/dl 148*         Results from last 7 days   Lab Units 11/17/23  1517 11/11/23  2215   LACTIC ACID mmol/L 1.0 1.3 PROCALCITONIN ng/ml  --  0.10       Imaging: I have personally reviewed pertinent reports. CT head without contrast   Final Result by Lanning Kanner, MD (11/17 2065)      No acute intracranial abnormality. Workstation performed: [id number]         CT chest abdomen pelvis w contrast   Final Result by Lanning Kanner, MD ([id number])      Increasing loculated effusion in the right mid and lower chest. Atelectasis in the right chest has progressed to an even greater degree than pleural effusions and there is now slight rightward shift of the heart and mediastinal structures. Otherwise no significant interval change. Osteopenia with numerous healed chronic fractures reidentified. Reidentified circumscribed 14 mm right adrenal nodule. Although its imaging features are indeterminate, it does not have suspicious imaging features (heterogeneity, necrosis, irregular margins), therefore this is likely benign, and can be followed by    non-contrast abdomen CT or MRI in 12 months. Please note that for adrenal nodule > 1cm,  biochemical evaluation is suggested to rule out functioning adenoma, if not already performed. Adrenal recommendation based on institutional consensus and Journal    of [employer] of Radiology [id number]            Workstation performed: [id number]         IR IN-Patient Thoracentesis    (Results Pending)       EKG, Pathology, and Other Studies Reviewed on Admission:   EKG: reviewed    Allscripts / Epic Records Reviewed: Yes     ** Please Note: This note has been constructed using a voice recognition system.  **

## 2023-11-17 NOTE — ASSESSMENT & PLAN NOTE
Presented with abdominal pain with associated nausea vomiting diarrhea  Suspect secondary to gastroenteritis  We will continue with conservative management for now  Advance diet as tolerated  Zofran as needed

## 2023-11-17 NOTE — ED PROVIDER NOTES
History  Chief Complaint   Patient presents with    Abdominal Pain     Pt presents in wheelchair c/o "generalized abdominal pain and n/v/d."     Patient is a 76year old female with a past medical history significant for rheumatoid arthritis presenting to the emergency department for evaluation of generalized abdominal pain, nausea, vomiting, diarrhea. She states that her symptoms have been ongoing for the last 4-5 days. She reports decreased PO intake, about 4 episodes of nonbloody diarrhea daily, and 6-7 episodes of nonbloody nonbilious vomiting over the last 4-5 days. Pain is generalized and not localized to any quadrant. She is also reporting subjective fever, has not checked her temperature. She is denying chest pain, difficulty breathing, leg swelling, headache, dizziness, numbness, focal weakness. She does endorse generalized weakness. She is accompanied by a caregiver who reports that the patient has been very somnolent recently and difficult to wake up. Patient currently awake, alert, oriented x 3. She is denying any other complaints at this time. Prior to Admission Medications   Prescriptions Last Dose Informant Patient Reported?  Taking?   bisacodyl (FLEET) 10 MG/30ML ENEM Not Taking  No No   Sig: Insert 30 mL (10 mg total) into the rectum daily as needed for constipation   Patient not taking: Reported on 11/17/2023   dicyclomine (BENTYL) 20 mg tablet 11/16/2023  No Yes   Sig: Take 1 tablet (20 mg total) by mouth 2 (two) times a day   ondansetron (Zofran ODT) 4 mg disintegrating tablet Unknown  No No   Sig: Take 1 tablet (4 mg total) by mouth every 6 (six) hours as needed for nausea or vomiting   polyethylene glycol (MIRALAX) 17 g packet Past Week  No Yes   Sig: Take 17 g by mouth daily   predniSONE 5 mg tablet Past Month  Yes Yes   Sig: Take 5 mg by mouth 2 (two) times a day with meals   pregabalin (LYRICA) 75 mg capsule Unknown  Yes No   Sig: Take 150 mg by mouth 2 (two) times a day senna-docusate sodium (SENOKOT-S) 8.6-50 mg per tablet Unknown  No No   Sig: Take 2 tablets by mouth 2 (two) times a day      Facility-Administered Medications: None       History reviewed. No pertinent past medical history. History reviewed. No pertinent surgical history. History reviewed. No pertinent family history. I have reviewed and agree with the history as documented. E-Cigarette/Vaping    E-Cigarette Use Never User      E-Cigarette/Vaping Substances    Nicotine No     THC No     CBD No     Flavoring No     Other No     Unknown No      Social History     Tobacco Use    Smoking status: Every Day     Packs/day: 0.25     Types: Cigarettes     Passive exposure: Current    Smokeless tobacco: Never   Vaping Use    Vaping Use: Never used   Substance Use Topics    Alcohol use: Never    Drug use: Never       Review of Systems   Constitutional:  Positive for activity change, appetite change and fever. Negative for chills. HENT:  Negative for congestion and sore throat. Respiratory:  Negative for cough, chest tightness and shortness of breath. Cardiovascular:  Negative for chest pain. Gastrointestinal:  Positive for abdominal pain, diarrhea, nausea and vomiting. Negative for blood in stool. Neurological:  Positive for weakness. Negative for dizziness, syncope, light-headedness and headaches. All other systems reviewed and are negative. Physical Exam  Physical Exam  Vitals reviewed. Constitutional:       General: She is not in acute distress. Appearance: Normal appearance. She is normal weight. She is not ill-appearing, toxic-appearing or diaphoretic. HENT:      Head: Normocephalic and atraumatic. Right Ear: External ear normal.      Left Ear: External ear normal.      Mouth/Throat:      Mouth: Mucous membranes are dry. Pharynx: Oropharynx is clear. No oropharyngeal exudate or posterior oropharyngeal erythema. Eyes:      General: No scleral icterus.         Right eye: No discharge. Left eye: No discharge. Extraocular Movements: Extraocular movements intact. Conjunctiva/sclera: Conjunctivae normal.      Pupils: Pupils are equal, round, and reactive to light. Cardiovascular:      Rate and Rhythm: Normal rate and regular rhythm. Heart sounds: Normal heart sounds. No murmur heard. No friction rub. No gallop. Pulmonary:      Effort: Pulmonary effort is normal. No respiratory distress. Breath sounds: Normal breath sounds. No stridor. No wheezing, rhonchi or rales. Abdominal:      General: Abdomen is flat. Palpations: Abdomen is soft. Tenderness: There is abdominal tenderness (generalized abdominal tenderness with voluntary guarding in all 4 quadrants). There is guarding. There is no right CVA tenderness, left CVA tenderness or rebound. Musculoskeletal:      Cervical back: Normal range of motion and neck supple. Right lower leg: No edema. Left lower leg: No edema. Skin:     General: Skin is warm and dry. Neurological:      Mental Status: She is alert and oriented to person, place, and time. GCS: GCS eye subscore is 4. GCS verbal subscore is 5. GCS motor subscore is 6.    Psychiatric:         Mood and Affect: Mood normal.         Behavior: Behavior normal.         Vital Signs  ED Triage Vitals   Temperature Pulse Respirations Blood Pressure SpO2   11/17/23 1327 11/17/23 1327 11/17/23 1327 11/17/23 1327 11/17/23 1327   98.5 °F (36.9 °C) 105 18 99/65 93 %      Temp Source Heart Rate Source Patient Position - Orthostatic VS BP Location FiO2 (%)   11/17/23 1327 11/17/23 1327 11/17/23 1327 11/17/23 1327 --   Oral Monitor Sitting Left arm       Pain Score       11/17/23 1850       3           Vitals:    11/17/23 1425 11/17/23 1730 11/17/23 1850 11/17/23 2152   BP: 105/71  103/74 102/73   Pulse: 97 94 92 99   Patient Position - Orthostatic VS: Lying  Lying          Visual Acuity      ED Medications  Medications   sodium chloride 0.9 % infusion (150 mL/hr Intravenous New Bag 11/17/23 1527)   nicotine (NICODERM CQ) 14 mg/24hr TD 24 hr patch 1 patch (has no administration in time range)   heparin (porcine) subcutaneous injection 5,000 Units (has no administration in time range)   ondansetron (ZOFRAN) injection 4 mg (has no administration in time range)   acetaminophen (TYLENOL) tablet 650 mg (has no administration in time range)   pregabalin (LYRICA) capsule 150 mg (150 mg Oral Given 11/17/23 1959)   predniSONE tablet 5 mg (has no administration in time range)   iohexol (OMNIPAQUE) 350 MG/ML injection (MULTI-DOSE) 100 mL (100 mL Intravenous Given 11/17/23 1634)       Diagnostic Studies  Results Reviewed       Procedure Component Value Units Date/Time    Rapid drug screen, urine [[id number]] Collected: 11/17/23 1533    Lab Status: In process Specimen: Urine, Clean Catch Updated: 11/17/23 2242    Lipase [[id number]]  (Abnormal) Collected: 11/17/23 1517    Lab Status: Final result Specimen: Blood from Arm, Right Updated: 11/17/23 1619     Lipase 9 u/L     Acetaminophen level-If concentration is detectable, please discuss with medical  on call.  [[id number]]  (Abnormal) Collected: 11/17/23 1517    Lab Status: Final result Specimen: Blood from Arm, Right Updated: 11/17/23 1619     Acetaminophen Level <2 ug/mL     Comprehensive metabolic panel [[id number]]  (Abnormal) Collected: 11/17/23 1517    Lab Status: Final result Specimen: Blood from Arm, Right Updated: 11/17/23 1619     Sodium 139 mmol/L      Potassium 4.7 mmol/L      Chloride 104 mmol/L      CO2 27 mmol/L      ANION GAP 8 mmol/L      BUN 18 mg/dL      Creatinine 0.59 mg/dL      Glucose 116 mg/dL      Calcium 9.4 mg/dL      Corrected Calcium 10.0 mg/dL      AST 23 U/L      ALT 9 U/L      Alkaline Phosphatase 102 U/L      Total Protein 6.8 g/dL      Albumin 3.3 g/dL      Total Bilirubin 0.60 mg/dL      eGFR 94 ml/min/1.73sq m     Narrative:      [facility] guidelines for Chronic Kidney Disease (CKD):     Stage 1 with normal or high GFR (GFR > 90 mL/min/1.73 square meters)    Stage 2 Mild CKD (GFR = 60-89 mL/min/1.73 square meters)    Stage 3A Moderate CKD (GFR = 45-59 mL/min/1.73 square meters)    Stage 3B Moderate CKD (GFR = 30-44 mL/min/1.73 square meters)    Stage 4 Severe CKD (GFR = 15-29 mL/min/1.73 square meters)    Stage 5 End Stage CKD (GFR <15 mL/min/1.73 square meters)  Note: GFR calculation is accurate only with a steady state creatinine    Salicylate level [[id number]]  (Normal) Collected: 11/17/23 1517    Lab Status: Final result Specimen: Blood from Arm, Right Updated: 72/81/73 6081     Salicylate Lvl <5 mg/dL     HS Troponin 0hr (reflex protocol) [[id number]]  (Normal) Collected: 11/17/23 1517    Lab Status: Final result Specimen: Blood from Arm, Right Updated: 11/17/23 1559     hs TnI 0hr 3 ng/L     B-Type Natriuretic Peptide(BNP) [[id number]]  (Normal) Collected: 11/17/23 1517    Lab Status: Final result Specimen: Blood from Arm, Right Updated: 11/17/23 1559     BNP 21 pg/mL     Urine Microscopic [[id number]]  (Abnormal) Collected: 11/17/23 1533    Lab Status: Final result Specimen: Urine, Clean Catch Updated: 11/17/23 1550     RBC, UA 1-2 /hpf      WBC, UA 2-4 /hpf      Epithelial Cells Moderate /hpf      Bacteria, UA Occasional /hpf      MUCUS THREADS Occasional    Lactic acid, plasma (w/reflex if result > 2.0) [[id number]]  (Normal) Collected: 11/17/23 1517    Lab Status: Final result Specimen: Blood from Arm, Right Updated: 11/17/23 1550     LACTIC ACID 1.0 mmol/L     Narrative:      Result may be elevated if tourniquet was used during collection.     Ethanol [[id number]]  (Normal) Collected: 11/17/23 1517    Lab Status: Final result Specimen: Blood from Arm, Right Updated: 11/17/23 1550     Ethanol Lvl <10 mg/dL     UA w Reflex to Microscopic w Reflex to Culture [[id number]]  (Abnormal) Collected: 11/17/23 153    Lab Status: Final result Specimen: Urine, Clean Catch Updated: 11/17/23 1548     Color, UA Yellow     Clarity, UA Clear     Specific Gravity, UA 1.022     pH, UA 5.5     Leukocytes, UA Negative     Nitrite, UA Negative     Protein, UA Trace mg/dl      Glucose, UA Negative mg/dl      Ketones, UA Negative mg/dl      Urobilinogen, UA 2.0 mg/dl      Bilirubin, UA Negative     Occult Blood, UA Negative    FLU/RSV/COVID - if FLU/RSV clinically relevant [[id number]]  (Normal) Collected: 11/17/23 1449    Lab Status: Final result Specimen: Nares from Nose Updated: 11/17/23 1534     SARS-CoV-2 Negative     INFLUENZA A PCR Negative     INFLUENZA B PCR Negative     RSV PCR Negative    Narrative:      FOR PEDIATRIC PATIENTS - copy/paste COVID Guidelines URL to browser: [email or web address]    SARS-CoV-2 assay is a Nucleic Acid Amplification assay intended for the  qualitative detection of nucleic acid from SARS-CoV-2 in nasopharyngeal  swabs. Results are for the presumptive identification of SARS-CoV-2 RNA. Positive results are indicative of infection with SARS-CoV-2, the virus  causing COVID-19, but do not rule out bacterial infection or co-infection  with other viruses. Laboratories within the [facility] and its  territories are required to report all positive results to the appropriate  public health authorities. Negative results do not preclude SARS-CoV-2  infection and should not be used as the sole basis for treatment or other  patient management decisions. Negative results must be combined with  clinical observations, patient history, and epidemiological information. This test has not been FDA cleared or approved. This test has been authorized by FDA under an Emergency Use Authorization  (EUA).  This test is only authorized for the duration of time the  declaration that circumstances exist justifying the authorization of the  emergency use of an in vitro diagnostic tests for detection of SARS-CoV-2  virus and/or diagnosis of COVID-19 infection under section 564(b)(1) of  the Act, 21 U. S.C. 402XAI-7(T)(6), unless the authorization is terminated  or revoked sooner. The test has been validated but independent review by FDA  and CLIA is pending. Test performed using [employer] GeneXpert: This RT-PCR assay targets N2,  a region unique to SARS-CoV-2. A conserved region in the E-gene was chosen  for pan-Sarbecovirus detection which includes SARS-CoV-2. According to CMS-2020-01-R, this platform meets the definition of high-throughput technology. CBC and differential [[id number]] Collected: 11/17/23 1517    Lab Status: Final result Specimen: Blood from Arm, Right Updated: 11/17/23 1525     WBC 8.02 Thousand/uL      RBC 4.39 Million/uL      Hemoglobin 13.1 g/dL      Hematocrit 41.5 %      MCV 95 fL      MCH 29.8 pg      MCHC 31.6 g/dL      RDW 14.0 %      MPV 10.5 fL      Platelets 238 Thousands/uL      nRBC 0 /100 WBCs      Neutrophils Relative 72 %      Immat GRANS % 0 %      Lymphocytes Relative 16 %      Monocytes Relative 9 %      Eosinophils Relative 3 %      Basophils Relative 0 %      Neutrophils Absolute 5.69 Thousands/µL      Immature Grans Absolute 0.02 Thousand/uL      Lymphocytes Absolute 1.28 Thousands/µL      Monocytes Absolute 0.75 Thousand/µL      Eosinophils Absolute 0.25 Thousand/µL      Basophils Absolute 0.03 Thousands/µL                    CT head without contrast   Final Result by Jimmy Mao MD (11/17 1655)      No acute intracranial abnormality. Workstation performed: [id number]         CT chest abdomen pelvis w contrast   Final Result by Jimmy Mao MD (65/01 3558)      Increasing loculated effusion in the right mid and lower chest. Atelectasis in the right chest has progressed to an even greater degree than pleural effusions and there is now slight rightward shift of the heart and mediastinal structures.       Otherwise no significant interval change. Osteopenia with numerous healed chronic fractures reidentified. Reidentified circumscribed 14 mm right adrenal nodule. Although its imaging features are indeterminate, it does not have suspicious imaging features (heterogeneity, necrosis, irregular margins), therefore this is likely benign, and can be followed by    non-contrast abdomen CT or MRI in 12 months. Please note that for adrenal nodule > 1cm,  biochemical evaluation is suggested to rule out functioning adenoma, if not already performed. Adrenal recommendation based on institutional consensus and Journal    of [employer] of Radiology 2486;56:[id number]            Workstation performed: [id number]         IR IN-Patient Thoracentesis    (Results Pending)              Procedures  Procedures         ED Course                               SBIRT 20yo+      Flowsheet Row Most Recent Value   Initial Alcohol Screen: US AUDIT-C     1. How often do you have a drink containing alcohol? 0 Filed at: 11/17/2023 1329   2. How many drinks containing alcohol do you have on a typical day you are drinking? 0 Filed at: 11/17/2023 1329   3b. FEMALE Any Age, or MALE 65+: How often do you have 4 or more drinks on one occassion? 0 Filed at: 11/17/2023 1329   Audit-C Score 0 Filed at: 11/17/2023 1329   HOLLIS: How many times in the past year have you. .. Used an illegal drug or used a prescription medication for non-medical reasons? Never Filed at: 11/17/2023 1329                      Medical Decision Making  Patient presenting to the emergency department for evaluation of generalized abdominal pain, nausea, vomiting, diarrhea. Upon arrival patient appears comfortable. She does not appear to be in any acute distress. Her vital signs are unremarkable. She has generalized abdominal tenderness on examination with voluntary guarding in all 4 quadrants. Mucous membranes are dry. Laboratory evaluation obtained and overall reassuring.   No evidence of UTI on urinalysis. Electrolytes normal.  Kidney function, LFTs, troponin normal.  COVID/flu/RSV test negative. CT of the abdomen pelvis without etiology of her abdominal pain, however does reveal worsening pleural effusion with rightward mediastinal shift. For this reason patient was admitted to internal medicine for further evaluation and management, probable IR thoracentesis. Patient is agreeable with this plan. She is in stable condition at time of admission. Amount and/or Complexity of Data Reviewed  Labs: ordered. Radiology: ordered. Risk  Prescription drug management. Decision regarding hospitalization. Disposition  Final diagnoses:   Pleural effusion on right   Generalized abdominal pain     Time reflects when diagnosis was documented in both MDM as applicable and the Disposition within this note       Time User Action Codes Description Comment    11/17/2023  6:09 PM Joshua Aburto Add [J90] Pleural effusion     11/17/2023  6:09 PM Derantelmoa Speaker Add [J90] Pleural effusion on right     11/17/2023  6:09 PM Derinda Speaker Modify [J90] Pleural effusion     11/17/2023  6:09 PM Derinda Speaker Modify [J90] Pleural effusion on right     11/17/2023  6:10 PM Russ Thomas, [address] [R10.92] Generalized abdominal pain           ED Disposition       ED Disposition   Admit    Condition   Stable    Date/Time   Fri Nov 17, 2023 6360    Comment   Case was discussed with ROBBIE and the patient's admission status was agreed to be Admission Status: observation status to the service of Dr. Alden Bean .                Follow-up Information    None         Current Discharge Medication List        CONTINUE these medications which have NOT CHANGED    Details   dicyclomine (BENTYL) 20 mg tablet Take 1 tablet (20 mg total) by mouth 2 (two) times a day  Qty: 20 tablet, Refills: 0    Associated Diagnoses: Abdominal pain      polyethylene glycol (MIRALAX) 17 g packet Take 17 g by mouth daily  Refills: 0    Associated Diagnoses: Slow transit constipation      predniSONE 5 mg tablet Take 5 mg by mouth 2 (two) times a day with meals      bisacodyl (FLEET) 10 MG/30ML ENEM Insert 30 mL (10 mg total) into the rectum daily as needed for constipation  Qty: 30 mL, Refills: 0    Associated Diagnoses: Slow transit constipation      ondansetron (Zofran ODT) 4 mg disintegrating tablet Take 1 tablet (4 mg total) by mouth every 6 (six) hours as needed for nausea or vomiting  Qty: 20 tablet, Refills: 0    Associated Diagnoses: Abdominal pain      pregabalin (LYRICA) 75 mg capsule Take 150 mg by mouth 2 (two) times a day      senna-docusate sodium (SENOKOT-S) 8.6-50 mg per tablet Take 2 tablets by mouth 2 (two) times a day  Qty: 7 tablet, Refills: 0    Associated Diagnoses: Slow transit constipation             No discharge procedures on file.     PDMP Review       None            ED Provider  Electronically Signed by             Ed Sanchez PA-C  11/17/23 5541

## 2023-11-18 PROBLEM — K52.9 COLITIS: Status: ACTIVE | Noted: 2023-11-12

## 2023-11-18 LAB
ALBUMIN SERPL BCP-MCNC: 2.9 G/DL (ref 3.5–5)
ALP SERPL-CCNC: 81 U/L (ref 34–104)
ALT SERPL W P-5'-P-CCNC: 6 U/L (ref 7–52)
ANION GAP SERPL CALCULATED.3IONS-SCNC: 7 MMOL/L
ARTERIAL PATENCY WRIST A: YES
AST SERPL W P-5'-P-CCNC: 12 U/L (ref 13–39)
BASE EXCESS BLDA CALC-SCNC: 2.1 MMOL/L
BASOPHILS # BLD AUTO: 0.03 THOUSANDS/ÂΜL (ref 0–0.1)
BASOPHILS NFR BLD AUTO: 1 % (ref 0–1)
BILIRUB DIRECT SERPL-MCNC: 0.06 MG/DL (ref 0–0.2)
BILIRUB SERPL-MCNC: 0.47 MG/DL (ref 0.2–1)
BUN SERPL-MCNC: 12 MG/DL (ref 5–25)
CALCIUM SERPL-MCNC: 9.1 MG/DL (ref 8.4–10.2)
CHLORIDE SERPL-SCNC: 106 MMOL/L (ref 96–108)
CO2 SERPL-SCNC: 27 MMOL/L (ref 21–32)
CREAT SERPL-MCNC: 0.63 MG/DL (ref 0.6–1.3)
EOSINOPHIL # BLD AUTO: 0.45 THOUSAND/ÂΜL (ref 0–0.61)
EOSINOPHIL NFR BLD AUTO: 9 % (ref 0–6)
ERYTHROCYTE [DISTWIDTH] IN BLOOD BY AUTOMATED COUNT: 14.2 % (ref 11.6–15.1)
GFR SERPL CREATININE-BSD FRML MDRD: 92 ML/MIN/1.73SQ M
GLUCOSE SERPL-MCNC: 159 MG/DL (ref 65–140)
GLUCOSE SERPL-MCNC: 84 MG/DL (ref 65–140)
HCO3 BLDA-SCNC: 27.1 MMOL/L (ref 22–28)
HCT VFR BLD AUTO: 37.2 % (ref 34.8–46.1)
HGB BLD-MCNC: 11.6 G/DL (ref 11.5–15.4)
IMM GRANULOCYTES # BLD AUTO: 0.03 THOUSAND/UL (ref 0–0.2)
IMM GRANULOCYTES NFR BLD AUTO: 1 % (ref 0–2)
INR PPP: 1.16 (ref 0.84–1.19)
LYMPHOCYTES # BLD AUTO: 1.17 THOUSANDS/ÂΜL (ref 0.6–4.47)
LYMPHOCYTES NFR BLD AUTO: 22 % (ref 14–44)
MCH RBC QN AUTO: 29.9 PG (ref 26.8–34.3)
MCHC RBC AUTO-ENTMCNC: 31.2 G/DL (ref 31.4–37.4)
MCV RBC AUTO: 96 FL (ref 82–98)
MONOCYTES # BLD AUTO: 0.5 THOUSAND/ÂΜL (ref 0.17–1.22)
MONOCYTES NFR BLD AUTO: 9 % (ref 4–12)
NASAL CANNULA: 0.5
NEUTROPHILS # BLD AUTO: 3.12 THOUSANDS/ÂΜL (ref 1.85–7.62)
NEUTS SEG NFR BLD AUTO: 58 % (ref 43–75)
NRBC BLD AUTO-RTO: 0 /100 WBCS
O2 CT BLDA-SCNC: 16.5 ML/DL (ref 16–23)
OXYHGB MFR BLDA: 91.7 % (ref 94–97)
PCO2 BLDA: 43.8 MM HG (ref 36–44)
PH BLDA: 7.41 [PH] (ref 7.35–7.45)
PLATELET # BLD AUTO: 275 THOUSANDS/UL (ref 149–390)
PMV BLD AUTO: 10.5 FL (ref 8.9–12.7)
PO2 BLDA: 68.4 MM HG (ref 75–129)
POTASSIUM SERPL-SCNC: 4.2 MMOL/L (ref 3.5–5.3)
PROCALCITONIN SERPL-MCNC: 0.07 NG/ML
PROT SERPL-MCNC: 6.4 G/DL (ref 6.4–8.4)
PROTHROMBIN TIME: 15.5 SECONDS (ref 11.6–14.5)
RBC # BLD AUTO: 3.88 MILLION/UL (ref 3.81–5.12)
SODIUM SERPL-SCNC: 140 MMOL/L (ref 135–147)
SPECIMEN SOURCE: ABNORMAL
WBC # BLD AUTO: 5.3 THOUSAND/UL (ref 4.31–10.16)

## 2023-11-18 PROCEDURE — 82948 REAGENT STRIP/BLOOD GLUCOSE: CPT

## 2023-11-18 PROCEDURE — 80076 HEPATIC FUNCTION PANEL: CPT | Performed by: INTERNAL MEDICINE

## 2023-11-18 PROCEDURE — 85610 PROTHROMBIN TIME: CPT | Performed by: INTERNAL MEDICINE

## 2023-11-18 PROCEDURE — 36600 WITHDRAWAL OF ARTERIAL BLOOD: CPT

## 2023-11-18 PROCEDURE — 85025 COMPLETE CBC W/AUTO DIFF WBC: CPT | Performed by: INTERNAL MEDICINE

## 2023-11-18 PROCEDURE — 84145 PROCALCITONIN (PCT): CPT | Performed by: NURSE PRACTITIONER

## 2023-11-18 PROCEDURE — 99223 1ST HOSP IP/OBS HIGH 75: CPT | Performed by: NURSE PRACTITIONER

## 2023-11-18 PROCEDURE — 80048 BASIC METABOLIC PNL TOTAL CA: CPT | Performed by: INTERNAL MEDICINE

## 2023-11-18 PROCEDURE — 99232 SBSQ HOSP IP/OBS MODERATE 35: CPT | Performed by: STUDENT IN AN ORGANIZED HEALTH CARE EDUCATION/TRAINING PROGRAM

## 2023-11-18 PROCEDURE — 82805 BLOOD GASES W/O2 SATURATION: CPT | Performed by: STUDENT IN AN ORGANIZED HEALTH CARE EDUCATION/TRAINING PROGRAM

## 2023-11-18 RX ORDER — ALBUMIN, HUMAN INJ 5% 5 %
25 SOLUTION INTRAVENOUS ONCE
Status: COMPLETED | OUTPATIENT
Start: 2023-11-18 | End: 2023-11-18

## 2023-11-18 RX ADMIN — HEPARIN SODIUM 5000 UNITS: 5000 INJECTION INTRAVENOUS; SUBCUTANEOUS at 19:32

## 2023-11-18 RX ADMIN — PREGABALIN 150 MG: 75 CAPSULE ORAL at 19:30

## 2023-11-18 RX ADMIN — PREDNISONE 5 MG: 5 TABLET ORAL at 08:26

## 2023-11-18 RX ADMIN — SODIUM CHLORIDE 75 ML/HR: 0.9 INJECTION, SOLUTION INTRAVENOUS at 08:29

## 2023-11-18 RX ADMIN — PREDNISONE 5 MG: 5 TABLET ORAL at 19:31

## 2023-11-18 RX ADMIN — PREGABALIN 150 MG: 75 CAPSULE ORAL at 08:26

## 2023-11-18 RX ADMIN — SODIUM CHLORIDE 75 ML/HR: 0.9 INJECTION, SOLUTION INTRAVENOUS at 22:36

## 2023-11-18 RX ADMIN — ALBUMIN (HUMAN) 25 G: 12.5 INJECTION, SOLUTION INTRAVENOUS at 05:40

## 2023-11-18 NOTE — ASSESSMENT & PLAN NOTE
Incidental finding, asymptomatic without oxygen requirements  Unclear etiology, refer to CT imaging report  Low suspicion for acute bacterial pneumonia given absence of fevers, leukocytosis, and oxygen requirements  Pulmonology and IR consulted for further management  Monitor off antibiotics for now

## 2023-11-18 NOTE — UTILIZATION REVIEW
Initial Clinical Review    OBS order 11/17 1810 converted to IP on 11/18 1012 for treatment o f pleural effusion     Admission: Date/Time/Statement:   Admission Orders (From admission, onward)       Ordered        11/18/23 1012  Inpatient Admission  Once            11/17/23 1810  Place in Observation  Once                           Inpatient Admission     Standing Status:   Standing     Number of Occurrences:   1     Order Specific Question:   Level of Care     Answer:   Med Surg [16]     Order Specific Question:   Estimated length of stay     Answer:   More than 2 Midnights     Order Specific Question:   Certification     Answer:   I certify that inpatient services are medically necessary for this patient for a duration of greater than two midnights. See H&P and MD Progress Notes for additional information about the patient's course of treatment. ED Arrival Information       Expected   -    Arrival   11/17/2023 13:20    Acuity   Urgent              Means of arrival   Wheelchair    Escorted by   [place]    Service   Hospitalist    Admission type   Emergency              Arrival complaint   WEAKNESS             Chief Complaint   Patient presents with    Abdominal Pain     Pt presents in wheelchair c/o "generalized abdominal pain and n/v/d."       Initial Presentation: 76 y.o. female to ED from home w/  PMHX rheumatoid arthritis who is present with abdominal pain nausea vomiting and diarrhea. Reports poor intake for the past 4 days. Reports diarrhea and nausea and vomiting that began yesterday. Found to have an increasing loculated effusion in the right middle and lower chest with slight rightward shift of heart and mediastinal structures . Admitted OBS status w/ pleural effusion possibly in setting of RA . Plan for pulm consult and IR . RA cont prednisone and lyrica . Abd pain , suspect sec to gastroenteritis . adv diet as brenda , zofran prn , cont conservative mngt . 11/18 IM Note   Monitor off abx .  Pulm and IR consulted . Cont nausea meds. Cont IVF . 11/18 Pulm Consult   The effusion has loculations and progressive atelectasis. It also has progressed relatively quickly in both size and degree of loculation. consideration to small pigtail catheter for more consistent drainage with TPA and Dornase may be more beneficial.  Would send fluid for culture, cytology, ADA and typical studies. Date: 11/19    Day 3: Has surpassed a 2nd midnight with active treatments and services, which include cont mngt of effusion , possible thoracentesis, IR consult , IVF       ED Triage Vitals   Temperature Pulse Respirations Blood Pressure SpO2   11/17/23 1327 11/17/23 1327 11/17/23 1327 11/17/23 1327 11/17/23 1327   98.5 °F (36.9 °C) 105 18 99/65 93 %      Temp Source Heart Rate Source Patient Position - Orthostatic VS BP Location FiO2 (%)   11/17/23 1327 11/17/23 1327 11/17/23 1327 11/17/23 1327 --   Oral Monitor Sitting Left arm       Pain Score       11/17/23 1850       3          Wt Readings from Last 1 Encounters:   11/18/23 43.8 kg (96 lb 9 oz)     Additional Vital Signs:   11/18/23 08:08:47 98.8 °F (37.1 °C) 86 -- 93/54 67 94 % -- --   11/18/23 06:34:59 -- 78 -- 86/41 Abnormal  56 Abnormal  97 % -- --   11/18/23 05:32:05 -- 81 -- 89/51 Abnormal  64 Abnormal  94 % -- --   11/18/23 05:26:48 -- 83 -- 88/51 Abnormal  63 Abnormal  89 % Abnormal  -- --   11/18/23 0200 97.8 °F (36.6 °C) 99 16 102/73 -- -- -- --   11/17/23 21:52:56 -- 99 -- 102/73 83 87 % Abnormal  -- --   11/17/23 18:50:29 97.8 °F (36.6 °C) 92 15 103/74 84 89 % Abnormal  -- Lying   11/17/23 1730 -- 94 19 -- -- 92 % -- --   11/17/23 1425 -- 97 26 Abnormal  105/71 83 91 % None (Room air) Lying     Pertinent Labs/Diagnostic Test Results:   11/17 EKG NSR   CT head without contrast   Final Result by Main Jacome MD (11/17 1011)      No acute intracranial abnormality.                   Workstation performed: [id number]         CT chest abdomen pelvis w contrast Final Result by Osmani Tang MD (25/93 0034)      Increasing loculated effusion in the right mid and lower chest. Atelectasis in the right chest has progressed to an even greater degree than pleural effusions and there is now slight rightward shift of the heart and mediastinal structures. Otherwise no significant interval change. Osteopenia with numerous healed chronic fractures reidentified. Reidentified circumscribed 14 mm right adrenal nodule. Although its imaging features are indeterminate, it does not have suspicious imaging features (heterogeneity, necrosis, irregular margins), therefore this is likely benign, and can be followed by    non-contrast abdomen CT or MRI in 12 months. Please note that for adrenal nodule > 1cm,  biochemical evaluation is suggested to rule out functioning adenoma, if not already performed.    Adrenal recommendation based on institutional consensus and Journal    of [employer] of Radiology 1814;38:[id number]            Workstation performed: [id number]         IR IN-Patient Thoracentesis    (Results Pending)     Results from last 7 days   Lab Units 11/17/23  1449 11/11/23  2159   SARS-COV-2  Negative Negative     Results from last 7 days   Lab Units 11/18/23  0514 11/17/23  1517 11/14/23  0446 11/13/23  0436 11/11/23  2215   WBC Thousand/uL 5.30 8.02 6.06 7.21 11.70*   HEMOGLOBIN g/dL 11.6 13.1 10.6* 12.0 13.1   HEMATOCRIT % 37.2 41.5 35.2 39.0 42.1   PLATELETS Thousands/uL 275 333 231 286 288   NEUTROS ABS Thousands/µL 3.12 5.69 4.58  --  9.88*         Results from last 7 days   Lab Units 11/18/23  0514 11/17/23  1517 11/14/23  0446 11/13/23  0436 11/11/23  2306   SODIUM mmol/L 140 139 141 139 136   POTASSIUM mmol/L 4.2 4.7 4.5 4.5 4.1   CHLORIDE mmol/L 106 104 103 101 100   CO2 mmol/L 27 27 33* 33* 28   ANION GAP mmol/L 7 8 5 5 8   BUN mg/dL 12 18 11 12 13   CREATININE mg/dL 0.63 0.59* 0.54* 0.61 0.62   EGFR ml/min/1.73sq m 92 94 97 93 92   CALCIUM mg/dL 9.1 9.4 9.2 9.5 9.6   MAGNESIUM mg/dL  --   --   --   --  1.9     Results from last 7 days   Lab Units 11/18/23  0514 11/17/23  1517 11/14/23  0446 11/13/23  1242 11/13/23  0436 11/11/23  2306   AST U/L 12* 23 13  --  15 11*   ALT U/L 6* 9 5*  --  4* 3*   ALK PHOS U/L 81 102 80  --  80 74   TOTAL PROTEIN g/dL 6.4 6.8 6.4  --  7.3 7.0   ALBUMIN g/dL 2.9* 3.3* 3.0*  --  3.4* 3.2*   TOTAL BILIRUBIN mg/dL 0.47 0.60 0.22  --  0.24 0.52   BILIRUBIN DIRECT mg/dL 0.06  --   --   --   --  0.16   AMMONIA umol/L  --   --   --  27  --   --      Results from last 7 days   Lab Units 11/13/23  0008   POC GLUCOSE mg/dl 148*     Results from last 7 days   Lab Units 11/18/23  0514 11/17/23  1517 11/14/23  0446 11/13/23  0436 11/11/23  2306   GLUCOSE RANDOM mg/dL 84 116 116 140 165*         Results from last 7 days   Lab Units 11/13/23  0039   PH TANVI  7.388   PCO2 TANVI mm Hg 54.6*   PO2 TANVI mm Hg 190.8*   HCO3 TANVI mmol/L 32.2*   BASE EXC TANVI mmol/L 5.7   O2 CONTENT TANVI ml/dL 18.7   O2 HGB, VENOUS % 95.8*         Results from last 7 days   Lab Units 11/17/23  1517 11/11/23  2215   HS TNI 0HR ng/L 3 3         Results from last 7 days   Lab Units 11/18/23  0514 11/11/23  2306   PROTIME seconds 15.5* 15.5*   INR  1.16 1.16   PTT seconds  --  25     Results from last 7 days   Lab Units 11/11/23  2306   TSH 3RD GENERATON uIU/mL 3.733     Results from last 7 days   Lab Units 11/11/23 2215   PROCALCITONIN ng/ml 0.10     Results from last 7 days   Lab Units 11/17/23  1517 11/11/23  2215   LACTIC ACID mmol/L 1.0 1.3     Results from last 7 days   Lab Units 11/17/23  1517 11/11/23  2215   BNP pg/mL 21 47       Results from last 7 days   Lab Units 11/17/23  1517 11/11/23  2306   LIPASE u/L 9* 7*     Results from last 7 days   Lab Units 11/17/23  1533 11/12/23  0203   CLARITY UA  Clear Clear   COLOR UA  Yellow Yellow   SPEC GRAV UA  1.022 <=1.005   PH UA  5.5 6.5   GLUCOSE UA mg/dl Negative Negative   KETONES UA mg/dl Negative Trace*   BLOOD UA Negative Negative   PROTEIN UA mg/dl Trace* Trace*   NITRITE UA  Negative Negative   BILIRUBIN UA  Negative Negative   UROBILINOGEN UA E.U./dl  --  0.2   UROBILINOGEN UA (BE) mg/dl 2.0*  --    LEUKOCYTES UA  Negative Negative   WBC UA /hpf 2-4* None Seen   RBC UA /hpf 1-2 None Seen   BACTERIA UA /hpf Occasional None Seen   EPITHELIAL CELLS WET PREP /hpf Moderate* None Seen   MUCUS THREADS  Occasional*  --      Results from last 7 days   Lab Units 11/17/23  1449 11/11/23  2159   INFLUENZA A PCR  Negative Negative   INFLUENZA B PCR  Negative Negative   RSV PCR  Negative Negative         Results from last 7 days   Lab Units 11/17/23  1533   AMPH/METH  Negative   BARBITURATE UR  Negative   BENZODIAZEPINE UR  Negative   COCAINE UR  Negative   METHADONE URINE  Negative   OPIATE UR  Negative   PCP UR  Negative   THC UR  Positive*     Results from last 7 days   Lab Units 11/17/23  1517   ETHANOL LVL mg/dL <10   ACETAMINOPHEN LVL ug/mL <2*   SALICYLATE LVL mg/dL <5     Results from last 7 days   Lab Units 11/12/23  0203 11/11/23  2217 11/11/23  2215   BLOOD CULTURE   --  No Growth After 5 Days. No Growth After 5 Days. URINE CULTURE  60,000-69,000 cfu/ml  --   --          ED Treatment:   Medication Administration from 11/17/2023 1320 to 11/17/2023 1843         Date/Time Order Dose Route Action Action by Comments     11/17/2023 1527 EST sodium chloride 0.9 % infusion 150 mL/hr Intravenous New Bag Brigido Lobo RN --          History reviewed. No pertinent past medical history.   Present on Admission:   Rheumatoid arthritis ([address])   Colitis      Admitting Diagnosis: Abdominal pain [R10.9]  Pleural effusion [J90]  Generalized abdominal pain [R10.84]  Pleural effusion on right [J90]  Age/Sex: 76 y.o. female  Admission Orders:  Scheduled Medications:  heparin (porcine), 5,000 Units, Subcutaneous, Q8H [address]  nicotine, 1 patch, Transdermal, Daily  predniSONE, 5 mg, Oral, BID With Meals  pregabalin, 150 mg, Oral, BID      Continuous IV Infusions:  sodium chloride, 75 mL/hr, Intravenous, Continuous      PRN Meds:  acetaminophen, 650 mg, Oral, Q6H PRN  ondansetron, 4 mg, Intravenous, Q6H PRN    Up and OOB   I&O   Reg diet       IP CONSULT TO PULMONOLOGY    Network Utilization Review Department  ATTENTION: Please call with any questions or concerns to [phone] and carefully listen to the prompts so that you are directed to the right person. All voicemails are confidential.   For Discharge needs, contact Care Management DC Support Team at [phone] opt. 2  Send all requests for admission clinical reviews, approved or denied determinations and any other requests to dedicated fax number below belonging to the campus where the patient is receiving treatment.  List of dedicated fax numbers for the Facilities:  [place] DENIALS (Administrative/Medical Necessity) [phone]   DISCHARGE SUPPORT TEAM (NETWORK) [id number] [facility] (Maternity/NICU/Pediatrics) [phone]   [address] [facility] [address] [facility] [phone]   [address] [facility] 207 [facility] [address] [place] [address] [address] [address] [facility] [address] [facility] [id number] [place] [address] [phone]

## 2023-11-18 NOTE — CONSULTS
Consultation - Pulmonary Medicine   Jarret President 76 y.o. female MRN: [id number]  Unit/Bed#: -01 Encounter: [id number]      Assessment/Plan:    Acute hypoxic pulmonary insufficiency with abnormal CT chest with RML/RLL atelectasis and associated loculated right pleural effusion   Titrate supplemental oxygen to maintain saturations greater than or equal to 89%. Mild elevation in PCO2 on VBG last admission. Can consider ABG if continues with drowsiness. Discussed with primary team.   Diagnostic and therapeutic thoracentesis by IR Monday or sooner if develops increased pulmonary symptoms/signs of infection. Fluid studies ordered. Pending results of thoracentesis, can determine next steps, repeat imaging, etc.   Check procalcitonin. Would need repeat imaging ultimately to evaluate for resolution of abnormalities as listed above. Colitis   Management per primary team.    Rheumatoid arthritis   Maintains on prednisone and Lyrica. Nicotine dependence   Recommend complete cessation. Nicotine replacement therapy per primary team.    D/W primary team and Dr. Criselda Morley, who agrees with plan as outlined. History of Present Illness   Physician Requesting Consult: Yennifer Resendez MD  Reason for Consult / Principal Problem: Pleural effusion  Hx and PE limited by: As below  Chief Complaint: "I am okay."  HPI: Jarret President is a 76 y.o. female who presented to [address] [place] [address] with complaints of abdominal pain in review of records and discussion with primary team.  She presented to the hospital for abdominal pain with nausea, vomiting, and diarrhea. She had poor oral intake for a few days prior to admission. She was admitted for colitis finding on CT, as well as pleural effusion. Today, she denies any shortness of breath. She does report an occasional dry cough. She is sleeping between disturbances. She offers no other specific complaints.     Inpatient consult to Pulmonology  Consult performed by: KACY Matson  Consult ordered by: Shakira Grace MD        Review of Systems   All other systems reviewed and are negative. A full 12-point review of systems was completed and is negative except for those outlined in the HPI. Historical Information   Past Medical History:   Diagnosis Date    Rheumatoid arthritis ([address])      History reviewed. No pertinent surgical history. Social History   Social History     Substance and Sexual Activity   Alcohol Use Never     Social History     Substance and Sexual Activity   Drug Use Never     Social History     Tobacco Use   Smoking Status Every Day    Packs/day: 0.25    Types: Cigarettes    Passive exposure: Current   Smokeless Tobacco Never     E-Cigarette/Vaping    E-Cigarette Use Never User      E-Cigarette/Vaping Substances    Nicotine No     THC No     CBD No     Flavoring No     Other No     Unknown No      Family History:   Family History   Family history unknown: Yes       Meds/Allergies   all current active meds have been reviewed, pertinent pulmonary meds have been reviewed, current meds:   Current Facility-Administered Medications   Medication Dose Route Frequency    acetaminophen (TYLENOL) tablet 650 mg  650 mg Oral Q6H PRN    heparin (porcine) subcutaneous injection 5,000 Units  5,000 Units Subcutaneous Q8H [address]    nicotine (NICODERM CQ) 14 mg/24hr TD 24 hr patch 1 patch  1 patch Transdermal Daily    ondansetron (ZOFRAN) injection 4 mg  4 mg Intravenous Q6H PRN    predniSONE tablet 5 mg  5 mg Oral BID With Meals    pregabalin (LYRICA) capsule 150 mg  150 mg Oral BID    sodium chloride 0.9 % infusion  75 mL/hr Intravenous Continuous   , and PTA meds:   Prior to Admission Medications   Prescriptions Last Dose Informant Patient Reported?  Taking?   bisacodyl (FLEET) 10 MG/30ML ENEM Not Taking  No No   Sig: Insert 30 mL (10 mg total) into the rectum daily as needed for constipation   Patient not taking: Reported on 11/17/2023   dicyclomine (BENTYL) 20 mg tablet 11/16/2023  No Yes   Sig: Take 1 tablet (20 mg total) by mouth 2 (two) times a day   ondansetron (Zofran ODT) 4 mg disintegrating tablet Unknown  No No   Sig: Take 1 tablet (4 mg total) by mouth every 6 (six) hours as needed for nausea or vomiting   polyethylene glycol (MIRALAX) 17 g packet Past Week  No Yes   Sig: Take 17 g by mouth daily   predniSONE 5 mg tablet Past Month  Yes Yes   Sig: Take 5 mg by mouth 2 (two) times a day with meals   pregabalin (LYRICA) 75 mg capsule Unknown  Yes No   Sig: Take 150 mg by mouth 2 (two) times a day   senna-docusate sodium (SENOKOT-S) 8.6-50 mg per tablet Unknown  No No   Sig: Take 2 tablets by mouth 2 (two) times a day      Facility-Administered Medications: None       Allergies   Allergen Reactions    Latex Hives    Aspirin GI Intolerance     Other reaction(s): Nausea/vomiting    Cephalexin GI Intolerance    Morphine GI Intolerance    Sulfamethoxazole-Trimethoprim GI Intolerance       Objective   Vitals: Blood pressure 93/59, pulse 85, temperature 98.8 °F (37.1 °C), resp. rate 16, height 5' 3" (1.6 m), weight 43.8 kg (96 lb 9 oz), SpO2 96 %. 2L NC,Body mass index is 17.11 kg/m². Intake/Output Summary (Last 24 hours) at 11/18/2023 1153  Last data filed at 11/18/2023 1011  Gross per 24 hour   Intake 480 ml   Output --   Net 480 ml     Invasive Devices       Peripheral Intravenous Line  Duration             Peripheral IV 11/17/23 Right;Ventral (anterior); Dorsal (posterior) Forearm <1 day                    Physical Exam  Vitals reviewed. Constitutional:       General: She is sleeping. She is not in acute distress. Appearance: She is well-developed and underweight. She is not toxic-appearing or diaphoretic. Interventions: Nasal cannula in place. HENT:      Head: Normocephalic and atraumatic. Eyes:      General: No scleral icterus. Extraocular Movements: Extraocular movements intact. Neck:      Trachea: No tracheal deviation. Cardiovascular:      Rate and Rhythm: Normal rate and regular rhythm. Heart sounds: S1 normal and S2 normal. No murmur heard. No friction rub. No gallop. Pulmonary:      Effort: Pulmonary effort is normal. No tachypnea, accessory muscle usage or respiratory distress. Breath sounds: Normal breath sounds. No stridor. No decreased breath sounds, wheezing, rhonchi or rales. Chest:      Chest wall: No tenderness. Abdominal:      General: Bowel sounds are normal. There is no distension. Palpations: Abdomen is soft. Tenderness: There is no abdominal tenderness. Musculoskeletal:      Cervical back: Neck supple. Right lower leg: No edema. Left lower leg: No edema. Skin:     General: Skin is warm and dry. Findings: No rash. Neurological:      Mental Status: She is easily aroused. GCS: GCS eye subscore is 3. GCS verbal subscore is 5. GCS motor subscore is 6. Lab Results: CBC:   Lab Results   Component Value Date    WBC 5.30 11/18/2023    HGB 11.6 11/18/2023    HCT 37.2 11/18/2023    MCV 96 11/18/2023     11/18/2023    RBC 3.88 11/18/2023    MCH 29.9 11/18/2023    MCHC 31.2 (L) 11/18/2023    RDW 14.2 11/18/2023    MPV 10.5 11/18/2023    NRBC 0 11/18/2023   , CMP:   Lab Results   Component Value Date    SODIUM 140 11/18/2023    K 4.2 11/18/2023     11/18/2023    CO2 27 11/18/2023    BUN 12 11/18/2023    CREATININE 0.63 11/18/2023    CALCIUM 9.1 11/18/2023    AST 12 (L) 11/18/2023    ALT 6 (L) 11/18/2023    ALKPHOS 81 11/18/2023    EGFR 92 11/18/2023     INR: 1.16    Flu/COVID/RSV PCR: Negative    BNP: 21    Rapid UDS: Positive for THC    Imaging Studies: I have personally reviewed pertinent reports. and I have personally reviewed pertinent films in PACS   CT chest shows compressive atelectasis RML/RLL with loculated right pleural effusion and trace left pleural effusion.       Code Status: Level 1 - Full Code    Portions of the record may have been created with voice recognition software. Occasional wrong word or "sound a like" substitutions may have occurred due to the inherent limitations of voice recognition software. Read the chart carefully and recognize, using context, where substitutions have occurred.

## 2023-11-18 NOTE — PLAN OF CARE
Initially very lethargic when first assessed, decreased oxygen from 2 liters to 0.5 liters.  Pt did wake up around 1 pm and was able to use BSC and feed herself lunch, appetite good

## 2023-11-18 NOTE — PROGRESS NOTES
[address]  Progress Note  Name: Suzy Cardona  MRN: [id number]  Unit/Bed#: -01 I Date of Admission: 11/17/2023   Date of Service: 11/18/2023 I Hospital Day: 0    Assessment/Plan   Colitis  Assessment & Plan  CT AP showing possible segmental colitis  Continue supportive care, low suspicion for acute bacterial diarrhea  Monitor off antibiotics     * Pleural effusion  Assessment & Plan  Incidental finding, asymptomatic without oxygen requirements  Unclear etiology, refer to CT imaging report  Low suspicion for acute bacterial pneumonia given absence of fevers, leukocytosis, and oxygen requirements  Pulmonology and IR consulted for further management  Monitor off antibiotics for now     Rheumatoid arthritis [facility])  Assessment & Plan  Continue prednisone and Lyrica               VTE Pharmacologic Prophylaxis: VTE Score: 5 High Risk (Score >/= 5) - Pharmacological DVT Prophylaxis Ordered: heparin. Sequential Compression Devices Ordered. Mobility:   Basic Mobility Inpatient Raw Score: 18  JH-HLM Goal: 6: Walk 10 steps or more  JH-HLM Achieved: 6: Walk 10 steps or more  HLM Goal achieved. Continue to encourage appropriate mobility. Patient Centered Rounds: I performed bedside rounds with nursing staff today. Discussions with Specialists or Other Care Team Provider: sabas    Education and Discussions with Family / Patient:  Contacted patient's grand daughter pratik in the chart, per phone- does not accept calls currently and unable to leave a voicemail. Total Time Spent on Date of Encounter in care of patient: 30+ mins. This time was spent on one or more of the following: performing physical exam; counseling and coordination of care; obtaining or reviewing history; documenting in the medical record; reviewing/ordering tests, medications or procedures; communicating with other healthcare professionals and discussing with patient's family/caregivers.     Current Length of Stay: 0 day(s)  Current Patient Status: Inpatient   Certification Statement: The patient will continue to require additional inpatient hospital stay due to fluids, anti nausea meds, IR and pulm consult   Discharge Plan: Anticipate discharge in 24-48 hrs to home. Code Status: Level 1 - Full Code    Subjective:   Patient feels better today     Objective:     Vitals:   Temp (24hrs), Av.2 °F (36.8 °C), Min:97.8 °F (36.6 °C), Max:98.8 °F (37.1 °C)    Temp:  [97.8 °F (36.6 °C)-98.8 °F (37.1 °C)] 98.8 °F (37.1 °C)  HR:  [] 86  Resp:  [15-26] 16  BP: ()/(41-74) 93/54  SpO2:  [87 %-97 %] 94 %  Body mass index is 17.11 kg/m². Input and Output Summary (last 24 hours): Intake/Output Summary (Last 24 hours) at 2023 1023  Last data filed at 2023 1011  Gross per 24 hour   Intake 480 ml   Output --   Net 480 ml       Physical Exam:   Physical Exam  Constitutional:       General: She is not in acute distress. Appearance: Normal appearance. She is not toxic-appearing. Cardiovascular:      Rate and Rhythm: Normal rate and regular rhythm. Heart sounds: Normal heart sounds. No murmur heard. Pulmonary:      Effort: Pulmonary effort is normal. No respiratory distress. Breath sounds: Normal breath sounds. No wheezing. Abdominal:      General: Abdomen is flat. There is no distension. Palpations: Abdomen is soft. Tenderness: There is no abdominal tenderness. Neurological:      General: No focal deficit present. Mental Status: She is alert and oriented to person, place, and time. Mental status is at baseline. Motor: No weakness.           Additional Data:     Labs:  Results from last 7 days   Lab Units 23  0514   WBC Thousand/uL 5.30   HEMOGLOBIN g/dL 11.6   HEMATOCRIT % 37.2   PLATELETS Thousands/uL 275   NEUTROS PCT % 58   LYMPHS PCT % 22   MONOS PCT % 9   EOS PCT % 9*     Results from last 7 days   Lab Units 23  0514   SODIUM mmol/L 140   POTASSIUM mmol/L 4. 2   CHLORIDE mmol/L 106   CO2 mmol/L 27   BUN mg/dL 12   CREATININE mg/dL 0.63   ANION GAP mmol/L 7   CALCIUM mg/dL 9.1   ALBUMIN g/dL 2.9*   TOTAL BILIRUBIN mg/dL 0.47   ALK PHOS U/L 81   ALT U/L 6*   AST U/L 12*   GLUCOSE RANDOM mg/dL 84     Results from last 7 days   Lab Units 11/18/23  0514   INR  1.16     Results from last 7 days   Lab Units 11/13/23  0008   POC GLUCOSE mg/dl 148*         Results from last 7 days   Lab Units 11/17/23  1517 11/11/23  2215   LACTIC ACID mmol/L 1.0 1.3   PROCALCITONIN ng/ml  --  0.10       Lines/Drains:  Invasive Devices       Peripheral Intravenous Line  Duration             Peripheral IV 11/17/23 Right;Ventral (anterior); Dorsal (posterior) Forearm <1 day                          Imaging: Reviewed radiology reports from this admission including: chest CT scan and abdominal/pelvic CT    Recent Cultures (last 7 days):   Results from last 7 days   Lab Units 11/12/23  0203 11/11/23  2217 11/11/23  2215   BLOOD CULTURE   --  No Growth After 5 Days. No Growth After 5 Days. URINE CULTURE  60,000-69,000 cfu/ml  --   --        Last 24 Hours Medication List:   Current Facility-Administered Medications   Medication Dose Route Frequency Provider Last Rate    acetaminophen  650 mg Oral Q6H PRN Joshua Aburto MD      heparin (porcine)  5,000 Units Subcutaneous Q8H Ken Choudhary MD      nicotine  1 patch Transdermal Daily Joshua Aburto MD      ondansetron  4 mg Intravenous Q6H PRN Joshua Aburto MD      predniSONE  5 mg Oral BID With Meals Joshua Aburto MD      pregabalin  150 mg Oral BID Joshua Aburto MD      sodium chloride  75 mL/hr Intravenous Continuous Joshua Aburto MD 75 mL/hr (11/18/23 0849)        Today, Patient Was Seen By: Binta Meadows MD    **Please Note: This note may have been constructed using a voice recognition system. **

## 2023-11-18 NOTE — PLAN OF CARE
Problem: PAIN - ADULT  Goal: Verbalizes/displays adequate comfort level or baseline comfort level  Description: Interventions:  - Encourage patient to monitor pain and request assistance  - Assess pain using appropriate pain scale  - Administer analgesics based on type and severity of pain and evaluate response  - Implement non-pharmacological measures as appropriate and evaluate response  - Consider cultural and social influences on pain and pain management  - Notify physician/advanced practitioner if interventions unsuccessful or patient reports new pain  Outcome: Progressing     Problem: INFECTION - ADULT  Goal: Absence or prevention of progression during hospitalization  Description: INTERVENTIONS:  - Assess and monitor for signs and symptoms of infection  - Monitor lab/diagnostic results  - Monitor all insertion sites, i.e. indwelling lines, tubes, and drains  - Monitor endotracheal if appropriate and nasal secretions for changes in amount and color  - [place] appropriate cooling/warming therapies per order  - Administer medications as ordered  - Instruct and encourage patient and family to use good hand hygiene technique  - Identify and instruct in appropriate isolation precautions for identified infection/condition  Outcome: Progressing  Goal: Absence of fever/infection during neutropenic period  Description: INTERVENTIONS:  - Monitor WBC    Outcome: Progressing     Problem: SAFETY ADULT  Goal: Patient will remain free of falls  Description: INTERVENTIONS:  - Educate patient/family on patient safety including physical limitations  - Instruct patient to call for assistance with activity   - Consult OT/PT to assist with strengthening/mobility   - Keep Call bell within reach  - Keep bed low and locked with side rails adjusted as appropriate  - Keep care items and personal belongings within reach  - Initiate and maintain comfort rounds  - Make Fall Risk Sign visible to staff  - Offer Toileting every 2 Hours, in advance of need  - Initiate/Maintain alarm  - Obtain necessary fall risk management equipment:   - Apply yellow socks and bracelet for high fall risk patients  - Consider moving patient to room near nurses station  Outcome: Progressing  Goal: Maintain or return to baseline ADL function  Description: INTERVENTIONS:  -  Assess patient's ability to carry out ADLs; assess patient's baseline for ADL function and identify physical deficits which impact ability to perform ADLs (bathing, care of mouth/teeth, toileting, grooming, dressing, etc.)  - Assess/evaluate cause of self-care deficits   - Assess range of motion  - Assess patient's mobility; develop plan if impaired  - Assess patient's need for assistive devices and provide as appropriate  - Encourage maximum independence but intervene and supervise when necessary  - Involve family in performance of ADLs  - Assess for home care needs following discharge   - Consider OT consult to assist with ADL evaluation and planning for discharge  - Provide patient education as appropriate  Outcome: Progressing  Goal: Maintains/Returns to pre admission functional level  Description: INTERVENTIONS:  - Perform AM-PAC 6 Click Basic Mobility/ Daily Activity assessment daily.  - Set and communicate daily mobility goal to care team and patient/family/caregiver. - Collaborate with rehabilitation services on mobility goals if consulted  - Perform Range of Motion 3 times a day. - Reposition patient every 2 hours.   - Dangle patient 3 times a day  - Stand patient 3 times a day  - Ambulate patient 3 times a day  - Out of bed to chair 3 times a day   - Out of bed for meals 3 times a day  - Out of bed for toileting  - Record patient progress and toleration of activity level   Outcome: Progressing     Problem: DISCHARGE PLANNING  Goal: Discharge to home or other facility with appropriate resources  Description: INTERVENTIONS:  - Identify barriers to discharge w/patient and caregiver  - Arrange for needed discharge resources and transportation as appropriate  - Identify discharge learning needs (meds, wound care, etc.)  - Arrange for interpretive services to assist at discharge as needed  - Refer to Case Management Department for coordinating discharge planning if the patient needs post-hospital services based on physician/advanced practitioner order or complex needs related to functional status, cognitive ability, or social support system  Outcome: Progressing     Problem: Knowledge Deficit  Goal: Patient/family/caregiver demonstrates understanding of disease process, treatment plan, medications, and discharge instructions  Description: Complete learning assessment and assess knowledge base.   Interventions:  - Provide teaching at level of understanding  - Provide teaching via preferred learning methods  Outcome: Progressing

## 2023-11-18 NOTE — ASSESSMENT & PLAN NOTE
CT AP showing possible segmental colitis  Continue supportive care, low suspicion for acute bacterial diarrhea  Monitor off antibiotics

## 2023-11-19 PROBLEM — R53.83 LETHARGY: Status: ACTIVE | Noted: 2023-11-19

## 2023-11-19 LAB
ALBUMIN SERPL BCP-MCNC: 3.4 G/DL (ref 3.5–5)
ALP SERPL-CCNC: 92 U/L (ref 34–104)
ALT SERPL W P-5'-P-CCNC: 5 U/L (ref 7–52)
ANION GAP SERPL CALCULATED.3IONS-SCNC: 7 MMOL/L
AST SERPL W P-5'-P-CCNC: 12 U/L (ref 13–39)
BASOPHILS # BLD AUTO: 0.02 THOUSANDS/ÂΜL (ref 0–0.1)
BASOPHILS NFR BLD AUTO: 0 % (ref 0–1)
BILIRUB SERPL-MCNC: 0.25 MG/DL (ref 0.2–1)
BUN SERPL-MCNC: 12 MG/DL (ref 5–25)
CALCIUM ALBUM COR SERPL-MCNC: 9.4 MG/DL (ref 8.3–10.1)
CALCIUM SERPL-MCNC: 8.9 MG/DL (ref 8.4–10.2)
CHLORIDE SERPL-SCNC: 110 MMOL/L (ref 96–108)
CO2 SERPL-SCNC: 24 MMOL/L (ref 21–32)
CREAT SERPL-MCNC: 0.61 MG/DL (ref 0.6–1.3)
EOSINOPHIL # BLD AUTO: 0.19 THOUSAND/ÂΜL (ref 0–0.61)
EOSINOPHIL NFR BLD AUTO: 3 % (ref 0–6)
ERYTHROCYTE [DISTWIDTH] IN BLOOD BY AUTOMATED COUNT: 14.1 % (ref 11.6–15.1)
GFR SERPL CREATININE-BSD FRML MDRD: 93 ML/MIN/1.73SQ M
GLUCOSE SERPL-MCNC: 145 MG/DL (ref 65–140)
HCT VFR BLD AUTO: 36.6 % (ref 34.8–46.1)
HGB BLD-MCNC: 11.3 G/DL (ref 11.5–15.4)
IMM GRANULOCYTES # BLD AUTO: 0.02 THOUSAND/UL (ref 0–0.2)
IMM GRANULOCYTES NFR BLD AUTO: 0 % (ref 0–2)
LYMPHOCYTES # BLD AUTO: 0.59 THOUSANDS/ÂΜL (ref 0.6–4.47)
LYMPHOCYTES NFR BLD AUTO: 10 % (ref 14–44)
MAGNESIUM SERPL-MCNC: 1.9 MG/DL (ref 1.9–2.7)
MCH RBC QN AUTO: 30.4 PG (ref 26.8–34.3)
MCHC RBC AUTO-ENTMCNC: 30.9 G/DL (ref 31.4–37.4)
MCV RBC AUTO: 98 FL (ref 82–98)
MONOCYTES # BLD AUTO: 0.38 THOUSAND/ÂΜL (ref 0.17–1.22)
MONOCYTES NFR BLD AUTO: 7 % (ref 4–12)
NEUTROPHILS # BLD AUTO: 4.45 THOUSANDS/ÂΜL (ref 1.85–7.62)
NEUTS SEG NFR BLD AUTO: 80 % (ref 43–75)
NRBC BLD AUTO-RTO: 0 /100 WBCS
PLATELET # BLD AUTO: 260 THOUSANDS/UL (ref 149–390)
PMV BLD AUTO: 10.7 FL (ref 8.9–12.7)
POTASSIUM SERPL-SCNC: 4.6 MMOL/L (ref 3.5–5.3)
PROT SERPL-MCNC: 6.6 G/DL (ref 6.4–8.4)
RBC # BLD AUTO: 3.72 MILLION/UL (ref 3.81–5.12)
SODIUM SERPL-SCNC: 141 MMOL/L (ref 135–147)
WBC # BLD AUTO: 5.65 THOUSAND/UL (ref 4.31–10.16)

## 2023-11-19 PROCEDURE — 85025 COMPLETE CBC W/AUTO DIFF WBC: CPT | Performed by: STUDENT IN AN ORGANIZED HEALTH CARE EDUCATION/TRAINING PROGRAM

## 2023-11-19 PROCEDURE — 0W993ZZ DRAINAGE OF RIGHT PLEURAL CAVITY, PERCUTANEOUS APPROACH: ICD-10-PCS | Performed by: INTERNAL MEDICINE

## 2023-11-19 PROCEDURE — 83735 ASSAY OF MAGNESIUM: CPT | Performed by: STUDENT IN AN ORGANIZED HEALTH CARE EDUCATION/TRAINING PROGRAM

## 2023-11-19 PROCEDURE — 80053 COMPREHEN METABOLIC PANEL: CPT | Performed by: STUDENT IN AN ORGANIZED HEALTH CARE EDUCATION/TRAINING PROGRAM

## 2023-11-19 PROCEDURE — 99232 SBSQ HOSP IP/OBS MODERATE 35: CPT | Performed by: STUDENT IN AN ORGANIZED HEALTH CARE EDUCATION/TRAINING PROGRAM

## 2023-11-19 RX ADMIN — HEPARIN SODIUM 5000 UNITS: 5000 INJECTION INTRAVENOUS; SUBCUTANEOUS at 21:57

## 2023-11-19 RX ADMIN — HEPARIN SODIUM 5000 UNITS: 5000 INJECTION INTRAVENOUS; SUBCUTANEOUS at 05:09

## 2023-11-19 RX ADMIN — HEPARIN SODIUM 5000 UNITS: 5000 INJECTION INTRAVENOUS; SUBCUTANEOUS at 14:28

## 2023-11-19 RX ADMIN — SODIUM CHLORIDE 75 ML/HR: 0.9 INJECTION, SOLUTION INTRAVENOUS at 11:54

## 2023-11-19 RX ADMIN — PREDNISONE 5 MG: 5 TABLET ORAL at 07:51

## 2023-11-19 RX ADMIN — PREGABALIN 150 MG: 75 CAPSULE ORAL at 09:17

## 2023-11-19 RX ADMIN — PREDNISONE 5 MG: 5 TABLET ORAL at 16:03

## 2023-11-19 RX ADMIN — PREGABALIN 150 MG: 75 CAPSULE ORAL at 18:09

## 2023-11-19 NOTE — CASE MANAGEMENT
Case Management Assessment & Discharge Planning Note    Patient name Avery Coyle  Location [address] [facility] 303/-56 MRN [id number]  : 1955 Date 2023       Current Admission Date: 2023  Current Admission Diagnosis:Pleural effusion   Patient Active Problem List    Diagnosis Date Noted    Pleural effusion 2023    Moderate protein-calorie malnutrition ([address]) 2023    Colitis 2023    Abnormal CT scan 2023    Adrenal nodule ([address]) 2023    Respiratory insufficiency 2023    Rheumatoid arthritis ([address]) 2023      LOS (days): 1  Geometric Mean LOS (GMLOS) (days):   Days to GMLOS:     OBJECTIVE:  PATIENT READMITTED TO HOSPITAL  Risk of Unplanned Readmission Score: 11.88         Current admission status: Inpatient       Preferred Pharmacy:   [facility]  [facility]  Phone: [phone] Fax: [phone]    Primary Care Provider: No primary care provider on file. Primary Insurance: [employer]  REP  Secondary Insurance:     ASSESSMENT:  [place] Proxies    There are no active Health Care Proxies on file. Advance Directives  Does patient have a Health Care POA?: No (Pt had named her dgt as POA, but they are now estranged and she is going to name someone else.   Paperwork supplied)  Was patient offered paperwork?: Yes (CM supplied info)  Does patient currently have a Health Care decision maker?: Yes, please see Health Care Proxy section  Does patient have Advance Directives?: No  Was patient offered paperwork?: Yes (CM supplied info)  Primary Contact: grandchild [address] and friend Real Gisselle         Readmission [facility]  30 Day Readmission: Yes  Who directed you to return to the hospital?: Self  Did you understand whom to contact if you had questions or problems?: Yes  Did you get your prescriptions before you left the hospital?: No  Reason[de-identified] Preference for own pharmacy  Were you able to get your prescriptions filled when you left the hospital?: Yes  Did you take your medications as prescribed?: Yes  Were you able to get to your follow-up appointments?: Yes  During previous admission, was a post-acute recommendation made?: No  Patient was readmitted due to: abd pain  Action Plan: pulmonary consult    Patient Information  Admitted from[de-identified] Home  Mental Status: Alert  During Assessment patient was accompanied by: Not accompanied during assessment  Assessment information provided by[de-identified] Patient  Primary Caregiver: Self  Support Systems: [address] of Residence: [address] [place] [address] do you live in?: [facility] entry access options.  Select all that apply.: Stairs  Number of steps to enter home.: 8  Do the steps have railings?: Yes  Type of Current Residence: [address]  Upon entering residence, is there a bedroom on the main floor (no further steps)?: Yes  Upon entering residence, is there a bathroom on the main floor (no further steps)?: Yes  Living Arrangements: Lives w/ Friend (lives with friends-Megan and Shweta Kauffman)  Is patient a ?: No    Activities of Daily Living Prior to Admission  Functional Status: Independent  Completes ADLs independently?: Yes  Ambulates independently?: Yes  Does patient use assisted devices?: Yes  Assisted Devices (DME) used: Straight Cane  Does patient currently own DME?: Yes  What DME does the patient currently own?: Straight Cane  Does patient have a history of Outpatient Therapy (PT/OT)?: No  Does the patient have a history of Short-Term Rehab?: Yes ([facility] in [place])  Does patient have a history of HHC?: No  Does patient currently have [address]?: No         Patient Information Continued  Income Source: Pension/long-term  Does patient have prescription coverage?: Yes  Does patient receive dialysis treatments?: No  Does patient have a history of substance abuse?: No  Does patient have a history of Mental Health Diagnosis?: No    PHQ 2/9 Screening Reviewed PHQ 2/9 Depression Screening Score?: No    Means of Transportation  Means of Transport to Appts[de-identified] Drives Self      Housing Stability: Not on file   Food Insecurity: Not on file   Transportation Needs: Not on file   Utilities: Not on file       DISCHARGE DETAILS:    Discharge planning discussed with[de-identified] patient  Freedom of Choice: Yes  Comments - Freedom of Choice: Pt is agreeable to VNA services for nursing and PT. She does have good support in the home with assistance from her friend [place]. She is hoping to make [place] her paid caregiver through the waiver program and phone number supplied to initiate this process.   CM contacted family/caregiver?: No- see comments (pt will update her friends and family herself)  Were Treatment Team discharge recommendations reviewed with patient/caregiver?: Yes  Did patient/caregiver verbalize understanding of patient care needs?: Yes  Were patient/caregiver advised of the risks associated with not following Treatment Team discharge recommendations?: Yes    Contacts  Patient Contacts: Merlin Side  Relationship to Patient[de-identified] [address]         Is the patient interested in [facility] at discharge?: Yes  [address] [place] [address] requested[de-identified] Nursing, Physical [facility] Provider[de-identified] PCP  Home Health Services Needed[de-identified] Evaluate Functional Status and Safety, Strengthening/Theraputic Exercises to Improve Function  Homebound Criteria Met[de-identified] Uses an Assist Device (i.e. cane, walker, etc), Requires the Assistance of Another Person for Safe Ambulation or to Leave the Home  Supporting Clincal Findings[de-identified] Dyspnea with Exertion, Limited Endurance, Fatigues Easliy in [employer]    DME Referral Provided  Referral made for DME?: No    Other Referral/Resources/Interventions Provided:  Interventions: [facility]  Referral Comments: VNA pended-pt has no preference for an agency    Would you like to participate in our [address] [employer] [address] [employer] service program?  : No - Declined    Treatment Team Recommendation: Home with [address]  Discharge Destination Plan[de-identified] Home with [address] at Discharge : Family

## 2023-11-19 NOTE — PLAN OF CARE
Pt again somolent , unable to wake up patient , Dr Regina Ha aware and abg's done, patient placed on one liter oxygen , blood sugar checked

## 2023-11-19 NOTE — PROGRESS NOTES
[address]  Progress Note  Name: Tyson Hollingsworth  MRN: [id number]  Unit/Bed#: -01 I Date of Admission: 11/17/2023   Date of Service: 11/19/2023 I Hospital Day: 1    Assessment/Plan   Colitis  Assessment & Plan  CT AP showing possible segmental colitis  Continue supportive care, low suspicion for acute bacterial diarrhea  Monitor off antibiotics   Improving     * Pleural effusion  Assessment & Plan  Incidental finding, asymptomatic without oxygen requirements  Unclear etiology, refer to CT imaging report  Low suspicion for acute bacterial pneumonia given absence of fevers, leukocytosis, and oxygen requirements  Pulmonology and IR consulted for further management  Goal for thoracentesis in AM  Monitor off antibiotics for now     Lethargy  Assessment & Plan  Severe lethargy yesterday  Likely from colitis  Now resolved, at baseline mental status     Rheumatoid arthritis (HCC)  Assessment & Plan  Continue prednisone and Lyrica           VTE Pharmacologic Prophylaxis: VTE Score: 5 High Risk (Score >/= 5) - Pharmacological DVT Prophylaxis Ordered: heparin. Sequential Compression Devices Ordered. Mobility:   Basic Mobility Inpatient Raw Score: 22  JH-HLM Goal: 7: Walk 25 feet or more  JH-HLM Achieved: 6: Walk 10 steps or more  HLM Goal achieved. Continue to encourage appropriate mobility. Patient Centered Rounds: I performed bedside rounds with nursing staff today. Discussions with Specialists or Other Care Team Provider: sabas    Education and Discussions with Family / Patient:  Called patient's granddaughter Stephanie December, the phone does not accept calls at this time, unable to leave a  voicemail, no other contacts in chart. Total Time Spent on Date of Encounter in care of patient: 30+ mins.  This time was spent on one or more of the following: performing physical exam; counseling and coordination of care; obtaining or reviewing history; documenting in the medical record; reviewing/ordering tests, medications or procedures; communicating with other healthcare professionals and discussing with patient's family/caregivers. Current Length of Stay: 1 day(s)  Current Patient Status: Inpatient   Certification Statement: The patient will continue to require additional inpatient hospital stay due to thoracentesis   Discharge Plan: Anticipate discharge tomorrow to home. Code Status: Level 1 - Full Code    Subjective:   Patient feels well today     Objective:     Vitals:   Temp (24hrs), Av.2 °F (36.8 °C), Min:97.6 °F (36.4 °C), Max:98.9 °F (37.2 °C)    Temp:  [97.6 °F (36.4 °C)-98.9 °F (37.2 °C)] 98.9 °F (37.2 °C)  HR:  [66-81] 78  BP: ()/(56-59) 118/59  SpO2:  [91 %-94 %] 94 %  Body mass index is 17.11 kg/m². Input and Output Summary (last 24 hours): Intake/Output Summary (Last 24 hours) at 2023 1237  Last data filed at 2023 0900  Gross per 24 hour   Intake 720 ml   Output 300 ml   Net 420 ml       Physical Exam:   Physical Exam  Constitutional:       General: She is not in acute distress. Appearance: Normal appearance. She is not toxic-appearing. Cardiovascular:      Rate and Rhythm: Normal rate and regular rhythm. Heart sounds: Normal heart sounds. No murmur heard. Pulmonary:      Effort: Pulmonary effort is normal. No respiratory distress. Breath sounds: Normal breath sounds. No wheezing. Abdominal:      General: Abdomen is flat. There is no distension. Palpations: Abdomen is soft. Tenderness: There is no abdominal tenderness. Neurological:      General: No focal deficit present. Mental Status: She is alert and oriented to person, place, and time. Mental status is at baseline. Motor: No weakness.           Additional Data:     Labs:  Results from last 7 days   Lab Units 23  0936   WBC Thousand/uL 5.65   HEMOGLOBIN g/dL 11.3*   HEMATOCRIT % 36.6   PLATELETS Thousands/uL 260   NEUTROS PCT % 80*   LYMPHS PCT % 10*   MONOS PCT % 7 EOS PCT % 3     Results from last 7 days   Lab Units 11/19/23  0936   SODIUM mmol/L 141   POTASSIUM mmol/L 4.6   CHLORIDE mmol/L 110*   CO2 mmol/L 24   BUN mg/dL 12   CREATININE mg/dL 0.61   ANION GAP mmol/L 7   CALCIUM mg/dL 8.9   ALBUMIN g/dL 3.4*   TOTAL BILIRUBIN mg/dL 0.25   ALK PHOS U/L 92   ALT U/L 5*   AST U/L 12*   GLUCOSE RANDOM mg/dL 145*     Results from last 7 days   Lab Units 11/18/23  0514   INR  1.16     Results from last 7 days   Lab Units 11/18/23  1723 11/13/23  0008   POC GLUCOSE mg/dl 159* 148*         Results from last 7 days   Lab Units 11/18/23  1853 11/17/23  1517   LACTIC ACID mmol/L  --  1.0   PROCALCITONIN ng/ml 0.07  --        Lines/Drains:  Invasive Devices       Peripheral Intravenous Line  Duration             Peripheral IV 11/17/23 Right;Ventral (anterior); Dorsal (posterior) Forearm 1 day                          Imaging: No pertinent imaging reviewed. Recent Cultures (last 7 days):         Last 24 Hours Medication List:   Current Facility-Administered Medications   Medication Dose Route Frequency Provider Last Rate    acetaminophen  650 mg Oral Q6H PRN Joshua Aburto MD      heparin (porcine)  5,000 Units Subcutaneous Q8H [facility] Joshua Aburto MD      nicotine  1 patch Transdermal Daily Joshua Aburto MD      ondansetron  4 mg Intravenous Q6H PRN Joshua Aburto MD      predniSONE  5 mg Oral BID With Meals Joshua Aburto MD      pregabalin  150 mg Oral BID Joshua Aburto MD      sodium chloride  75 mL/hr Intravenous Continuous Joshua Aburto MD 75 mL/hr (11/19/23 1154)        Today, Patient Was Seen By: Love Petit MD    **Please Note: This note may have been constructed using a voice recognition system. **

## 2023-11-19 NOTE — PLAN OF CARE
Problem: SAFETY ADULT  Goal: Patient will remain free of falls  Description: INTERVENTIONS:  - Educate patient/family on patient safety including physical limitations  - Instruct patient to call for assistance with activity   - Consult OT/PT to assist with strengthening/mobility   - Keep Call bell within reach  - Keep bed low and locked with side rails adjusted as appropriate  - Keep care items and personal belongings within reach  - Initiate and maintain comfort rounds  - Make Fall Risk Sign visible to staff  - Apply yellow socks and bracelet for high fall risk patients  - Consider moving patient to room near nurses station  Outcome: Progressing     Problem: SAFETY ADULT  Goal: Maintain or return to baseline ADL function  Description: INTERVENTIONS:  -  Assess patient's ability to carry out ADLs; assess patient's baseline for ADL function and identify physical deficits which impact ability to perform ADLs (bathing, care of mouth/teeth, toileting, grooming, dressing, etc.)  - Assess/evaluate cause of self-care deficits   - Assess range of motion  - Assess patient's mobility; develop plan if impaired  - Assess patient's need for assistive devices and provide as appropriate  - Encourage maximum independence but intervene and supervise when necessary  - Involve family in performance of ADLs  - Assess for home care needs following discharge   - Consider OT consult to assist with ADL evaluation and planning for discharge  - Provide patient education as appropriate  Outcome: Progressing     Problem: PAIN - ADULT  Goal: Verbalizes/displays adequate comfort level or baseline comfort level  Description: Interventions:  - Encourage patient to monitor pain and request assistance  - Assess pain using appropriate pain scale  - Administer analgesics based on type and severity of pain and evaluate response  - Implement non-pharmacological measures as appropriate and evaluate response  - Consider cultural and social influences on pain and pain management  - Notify physician/advanced practitioner if interventions unsuccessful or patient reports new pain  Outcome: Progressing

## 2023-11-19 NOTE — ASSESSMENT & PLAN NOTE
CT AP showing possible segmental colitis  Continue supportive care, low suspicion for acute bacterial diarrhea  Monitor off antibiotics   Improving

## 2023-11-19 NOTE — UTILIZATION REVIEW
NOTIFICATION OF INPATIENT ADMISSION   AUTHORIZATION REQUEST   SERVICING FACILITY:   [address] [place] [address] [address]  Tax ID: [id number]  NPI: [id number] ATTENDING PROVIDER:  Attending Name and NPI#: Stephanie Ariejed, [place] [[id number]]  Address: [address]  Phone: [phone]     ADMISSION INFORMATION:  Place of Service: [address] [place] [address]  Place of Service Code: 21  Inpatient Admission Date/Time: 11/18/23 10:12 AM  Discharge Date/Time: No discharge date for patient encounter. Admitting Diagnosis Code/Description:  Abdominal pain [R10.9]  Pleural effusion [J90]  Generalized abdominal pain [R10.84]  Pleural effusion on right [J90]     UTILIZATION REVIEW CONTACT:  Benjamin Palomino Utilization   Network Utilization Review Department  Phone: [phone]  Fax [phone]  Email: [email or web address]  Contact for approvals/pending authorizations, clinical reviews, and discharge. PHYSICIAN ADVISORY SERVICES:  Medical Necessity Denial & [facility] Review  Phone: [phone]  Fax: [phone]  Email: [email or web address]     DISCHARGE SUPPORT TEAM:  For Patients Discharge Needs & Updates  Phone: [phone] opt. 2 Fax: [phone]  Email: [email or web address]

## 2023-11-19 NOTE — PROGRESS NOTES
Patient removed her IV around 1600, 2 unsuccessful attempts by Rns were made. . Fellow RN colleague used the ultrasound to insert another 20 to her right upper extremity. RN went to flush IV, line and it was minimally resistive. Patient complained of pain at the IV site. Endorsed to oncoming shift to insert new IV.

## 2023-11-19 NOTE — ASSESSMENT & PLAN NOTE
Incidental finding, asymptomatic without oxygen requirements  Unclear etiology, refer to CT imaging report  Low suspicion for acute bacterial pneumonia given absence of fevers, leukocytosis, and oxygen requirements  Pulmonology and IR consulted for further management  Goal for thoracentesis in AM  Monitor off antibiotics for now

## 2023-11-20 ENCOUNTER — APPOINTMENT (INPATIENT)
Dept: RADIOLOGY | Facility: HOSPITAL | Age: 68
DRG: 546 | End: 2023-11-20
Payer: COMMERCIAL

## 2023-11-20 ENCOUNTER — APPOINTMENT (OUTPATIENT)
Dept: NON INVASIVE DIAGNOSTICS | Facility: HOSPITAL | Age: 68
DRG: 546 | End: 2023-11-20
Payer: COMMERCIAL

## 2023-11-20 PROBLEM — R53.83 LETHARGY: Status: RESOLVED | Noted: 2023-11-19 | Resolved: 2023-11-20

## 2023-11-20 PROBLEM — K52.9 COLITIS: Status: RESOLVED | Noted: 2023-11-12 | Resolved: 2023-11-20

## 2023-11-20 LAB
ALBUMIN SERPL BCP-MCNC: 3.1 G/DL (ref 3.5–5)
ALP SERPL-CCNC: 90 U/L (ref 34–104)
ALT SERPL W P-5'-P-CCNC: 6 U/L (ref 7–52)
ANION GAP SERPL CALCULATED.3IONS-SCNC: 5 MMOL/L
APPEARANCE FLD: NORMAL
AST SERPL W P-5'-P-CCNC: 10 U/L (ref 13–39)
BASOPHILS # BLD AUTO: 0.02 THOUSANDS/ÂΜL (ref 0–0.1)
BASOPHILS NFR BLD AUTO: 0 % (ref 0–1)
BILIRUB SERPL-MCNC: 0.22 MG/DL (ref 0.2–1)
BUN SERPL-MCNC: 11 MG/DL (ref 5–25)
CALCIUM ALBUM COR SERPL-MCNC: 9.7 MG/DL (ref 8.3–10.1)
CALCIUM SERPL-MCNC: 9 MG/DL (ref 8.4–10.2)
CHLORIDE SERPL-SCNC: 110 MMOL/L (ref 96–108)
CO2 SERPL-SCNC: 30 MMOL/L (ref 21–32)
COLOR FLD: YELLOW
CREAT SERPL-MCNC: 0.6 MG/DL (ref 0.6–1.3)
EOSINOPHIL # BLD AUTO: 0.23 THOUSAND/ÂΜL (ref 0–0.61)
EOSINOPHIL NFR BLD AUTO: 4 % (ref 0–6)
EOSINOPHIL NFR FLD MANUAL: 15 %
ERYTHROCYTE [DISTWIDTH] IN BLOOD BY AUTOMATED COUNT: 14 % (ref 11.6–15.1)
GFR SERPL CREATININE-BSD FRML MDRD: 93 ML/MIN/1.73SQ M
GLUCOSE FLD-MCNC: 91 MG/DL
GLUCOSE SERPL-MCNC: 99 MG/DL (ref 65–140)
HCT VFR BLD AUTO: 36.4 % (ref 34.8–46.1)
HGB BLD-MCNC: 11.2 G/DL (ref 11.5–15.4)
IMM GRANULOCYTES # BLD AUTO: 0.01 THOUSAND/UL (ref 0–0.2)
IMM GRANULOCYTES NFR BLD AUTO: 0 % (ref 0–2)
LDH FLD L TO P-CCNC: 136 U/L
LDH SERPL-CCNC: 91 U/L (ref 140–271)
LYMPHOCYTES # BLD AUTO: 0.9 THOUSANDS/ÂΜL (ref 0.6–4.47)
LYMPHOCYTES NFR BLD AUTO: 16 %
LYMPHOCYTES NFR BLD AUTO: 16 % (ref 14–44)
MAGNESIUM SERPL-MCNC: 2 MG/DL (ref 1.9–2.7)
MCH RBC QN AUTO: 30.1 PG (ref 26.8–34.3)
MCHC RBC AUTO-ENTMCNC: 30.8 G/DL (ref 31.4–37.4)
MCV RBC AUTO: 98 FL (ref 82–98)
MONOCYTES # BLD AUTO: 0.37 THOUSAND/ÂΜL (ref 0.17–1.22)
MONOCYTES NFR BLD AUTO: 32 %
MONOCYTES NFR BLD AUTO: 7 % (ref 4–12)
NEUTROPHILS # BLD AUTO: 4.1 THOUSANDS/ÂΜL (ref 1.85–7.62)
NEUTS SEG NFR BLD AUTO: 37 %
NEUTS SEG NFR BLD AUTO: 73 % (ref 43–75)
NRBC BLD AUTO-RTO: 0 /100 WBCS
PH BODY FLUID: 7.7
PLATELET # BLD AUTO: 257 THOUSANDS/UL (ref 149–390)
PMV BLD AUTO: 10.9 FL (ref 8.9–12.7)
POTASSIUM SERPL-SCNC: 4.3 MMOL/L (ref 3.5–5.3)
PROT FLD-MCNC: 3.3 G/DL
PROT SERPL-MCNC: 6.2 G/DL (ref 6.4–8.4)
RBC # BLD AUTO: 3.72 MILLION/UL (ref 3.81–5.12)
SITE: NORMAL
SODIUM SERPL-SCNC: 145 MMOL/L (ref 135–147)
TOTAL CELLS COUNTED SPEC: 100
WBC # BLD AUTO: 5.63 THOUSAND/UL (ref 4.31–10.16)
WBC # FLD MANUAL: 1822 /UL

## 2023-11-20 PROCEDURE — 80053 COMPREHEN METABOLIC PANEL: CPT | Performed by: STUDENT IN AN ORGANIZED HEALTH CARE EDUCATION/TRAINING PROGRAM

## 2023-11-20 PROCEDURE — 82945 GLUCOSE OTHER FLUID: CPT | Performed by: INTERNAL MEDICINE

## 2023-11-20 PROCEDURE — 32555 ASPIRATE PLEURA W/ IMAGING: CPT | Performed by: RADIOLOGY

## 2023-11-20 PROCEDURE — 87205 SMEAR GRAM STAIN: CPT | Performed by: INTERNAL MEDICINE

## 2023-11-20 PROCEDURE — 84157 ASSAY OF PROTEIN OTHER: CPT | Performed by: INTERNAL MEDICINE

## 2023-11-20 PROCEDURE — 87070 CULTURE OTHR SPECIMN AEROBIC: CPT | Performed by: INTERNAL MEDICINE

## 2023-11-20 PROCEDURE — 32555 ASPIRATE PLEURA W/ IMAGING: CPT

## 2023-11-20 PROCEDURE — 83986 ASSAY PH BODY FLUID NOS: CPT | Performed by: INTERNAL MEDICINE

## 2023-11-20 PROCEDURE — 83615 LACTATE (LD) (LDH) ENZYME: CPT | Performed by: NURSE PRACTITIONER

## 2023-11-20 PROCEDURE — 89051 BODY FLUID CELL COUNT: CPT | Performed by: INTERNAL MEDICINE

## 2023-11-20 PROCEDURE — 88112 CYTOPATH CELL ENHANCE TECH: CPT | Performed by: STUDENT IN AN ORGANIZED HEALTH CARE EDUCATION/TRAINING PROGRAM

## 2023-11-20 PROCEDURE — 85025 COMPLETE CBC W/AUTO DIFF WBC: CPT | Performed by: STUDENT IN AN ORGANIZED HEALTH CARE EDUCATION/TRAINING PROGRAM

## 2023-11-20 PROCEDURE — 99232 SBSQ HOSP IP/OBS MODERATE 35: CPT | Performed by: STUDENT IN AN ORGANIZED HEALTH CARE EDUCATION/TRAINING PROGRAM

## 2023-11-20 PROCEDURE — 99232 SBSQ HOSP IP/OBS MODERATE 35: CPT | Performed by: INTERNAL MEDICINE

## 2023-11-20 PROCEDURE — 83615 LACTATE (LD) (LDH) ENZYME: CPT | Performed by: INTERNAL MEDICINE

## 2023-11-20 PROCEDURE — 71045 X-RAY EXAM CHEST 1 VIEW: CPT

## 2023-11-20 PROCEDURE — 88305 TISSUE EXAM BY PATHOLOGIST: CPT | Performed by: STUDENT IN AN ORGANIZED HEALTH CARE EDUCATION/TRAINING PROGRAM

## 2023-11-20 PROCEDURE — 83735 ASSAY OF MAGNESIUM: CPT | Performed by: STUDENT IN AN ORGANIZED HEALTH CARE EDUCATION/TRAINING PROGRAM

## 2023-11-20 RX ORDER — LIDOCAINE WITH 8.4% SOD BICARB 0.9%(10ML)
SYRINGE (ML) INJECTION AS NEEDED
Status: COMPLETED | OUTPATIENT
Start: 2023-11-20 | End: 2023-11-20

## 2023-11-20 RX ADMIN — PREDNISONE 5 MG: 5 TABLET ORAL at 08:55

## 2023-11-20 RX ADMIN — PREDNISONE 5 MG: 5 TABLET ORAL at 18:11

## 2023-11-20 RX ADMIN — HEPARIN SODIUM 5000 UNITS: 5000 INJECTION INTRAVENOUS; SUBCUTANEOUS at 05:10

## 2023-11-20 RX ADMIN — Medication 10 ML: at 08:30

## 2023-11-20 RX ADMIN — ACETAMINOPHEN 650 MG: 325 TABLET, FILM COATED ORAL at 08:54

## 2023-11-20 RX ADMIN — HEPARIN SODIUM 5000 UNITS: 5000 INJECTION INTRAVENOUS; SUBCUTANEOUS at 21:43

## 2023-11-20 RX ADMIN — PREGABALIN 150 MG: 75 CAPSULE ORAL at 18:11

## 2023-11-20 RX ADMIN — PREGABALIN 150 MG: 75 CAPSULE ORAL at 08:55

## 2023-11-20 RX ADMIN — HEPARIN SODIUM 5000 UNITS: 5000 INJECTION INTRAVENOUS; SUBCUTANEOUS at 14:45

## 2023-11-20 NOTE — ASSESSMENT & PLAN NOTE
Incidental finding, asymptomatic without oxygen requirements  Unclear etiology, refer to CT imaging report  Low suspicion for acute bacterial pneumonia given absence of fevers, leukocytosis, and oxygen requirements  Pulmonology and IR consulted for further management  Status post thoracentesis with 400 cc fluid removed  Follow up fluid studies  Unable to reach grand daughter, and no other phone numbers in the chart  Dispo situation made difficult due to above, coordinate with

## 2023-11-20 NOTE — ASSESSMENT & PLAN NOTE
CT AP showing possible segmental colitis  Continue supportive care, low suspicion for acute bacterial diarrhea  Monitor off antibiotics   Stable, resolved

## 2023-11-20 NOTE — PROGRESS NOTES
Progress Note - Pulmonary   Court Norse 76 y.o. female MRN: [id number]  Unit/Bed#: -01 Encounter: [id number]    Assessment:  Acute hypoxemic pulmonary insufficiency  Abnormal chest CT with RML/RLL atelectasis and loculated right pleural effusion  Rheumatoid arthritis  Nicotine dependence    Plan:  Acute hypoxemic pulmonary insufficiency with loculated right-sided pleural effusion with associated RML/RLL atelectasis  Unclear etiology of the pleural effusion-she did have recent hospital admission for nausea/vomiting and encephalopathy, possible she could have had an aspiration event and this be a parapneumonic effusion? Possibly related to her underlying rheumatoid arthritis although this seems less likely. She reports having had a pleural effusion drained a couple of years ago as well  We will await pleural fluid studies, patient feels much improved after the thoracentesis this morning  We will order repeat chest x-ray, there were many loculations on the scan, may need a chest tube to completely evacuate the effusion  Will continue to follow    Subjective:   Patient resting in bed. She feels much better after her thoracentesis this morning. Objective:     Vitals: Blood pressure 117/71, pulse 87, temperature 97.9 °F (36.6 °C), resp. rate 18, height 5' 3" (1.6 m), weight 43.8 kg (96 lb 9 oz), SpO2 94 %. ,Body mass index is 17.11 kg/m².       Intake/Output Summary (Last 24 hours) at 11/20/2023 1037  Last data filed at 11/19/2023 1230  Gross per 24 hour   Intake 220 ml   Output --   Net 220 ml       Invasive Devices       None                   Physical Exam: /71   Pulse 87   Temp 97.9 °F (36.6 °C)   Resp 18   Ht 5' 3" (1.6 m)   Wt 43.8 kg (96 lb 9 oz)   SpO2 94%   BMI 17.11 kg/m²   General appearance: alert and oriented, in no acute distress  Head: Normocephalic, without obvious abnormality, atraumatic  Eyes: negative findings: conjunctivae and sclerae normal  Lungs:  diminished right base  Heart: regular rate and rhythm  Abdomen: normal findings: soft, non-tender  Extremities:  no edema  Skin:  warm and dry  Neurologic: Mental status: Alert, oriented, thought content appropriate     Labs: I have personally reviewed pertinent lab results. , CBC:   Lab Results   Component Value Date    WBC 5.63 11/20/2023    HGB 11.2 (L) 11/20/2023    HCT 36.4 11/20/2023    MCV 98 11/20/2023     11/20/2023    RBC 3.72 (L) 11/20/2023    MCH 30.1 11/20/2023    MCHC 30.8 (L) 11/20/2023    RDW 14.0 11/20/2023    MPV 10.9 11/20/2023    NRBC 0 11/20/2023   , CMP:   Lab Results   Component Value Date    SODIUM 145 11/20/2023    K 4.3 11/20/2023     (H) 11/20/2023    CO2 30 11/20/2023    BUN 11 11/20/2023    CREATININE 0.60 11/20/2023    CALCIUM 9.0 11/20/2023    AST 10 (L) 11/20/2023    ALT 6 (L) 11/20/2023    ALKPHOS 90 11/20/2023    EGFR 93 11/20/2023     Imaging and other studies: I have personally reviewed pertinent reports.    and I have personally reviewed pertinent films in PACS

## 2023-11-20 NOTE — PLAN OF CARE
Problem: PAIN - ADULT  Goal: Verbalizes/displays adequate comfort level or baseline comfort level  Description: Interventions:  - Encourage patient to monitor pain and request assistance  - Assess pain using appropriate pain scale  - Administer analgesics based on type and severity of pain and evaluate response  - Implement non-pharmacological measures as appropriate and evaluate response  - Consider cultural and social influences on pain and pain management  - Notify physician/advanced practitioner if interventions unsuccessful or patient reports new pain  Outcome: Progressing     Problem: INFECTION - ADULT  Goal: Absence or prevention of progression during hospitalization  Description: INTERVENTIONS:  - Assess and monitor for signs and symptoms of infection  - Monitor lab/diagnostic results  - Monitor all insertion sites, i.e. indwelling lines, tubes, and drains  - Monitor endotracheal if appropriate and nasal secretions for changes in amount and color  - [place] appropriate cooling/warming therapies per order  - Administer medications as ordered  - Instruct and encourage patient and family to use good hand hygiene technique  - Identify and instruct in appropriate isolation precautions for identified infection/condition  Outcome: Progressing  Goal: Absence of fever/infection during neutropenic period  Description: INTERVENTIONS:  - Monitor WBC    Outcome: Progressing     Problem: SAFETY ADULT  Goal: Maintain or return to baseline ADL function  Description: INTERVENTIONS:  -  Assess patient's ability to carry out ADLs; assess patient's baseline for ADL function and identify physical deficits which impact ability to perform ADLs (bathing, care of mouth/teeth, toileting, grooming, dressing, etc.)  - Assess/evaluate cause of self-care deficits   - Assess range of motion  - Assess patient's mobility; develop plan if impaired  - Assess patient's need for assistive devices and provide as appropriate  - Encourage maximum independence but intervene and supervise when necessary  - Involve family in performance of ADLs  - Assess for home care needs following discharge   - Consider OT consult to assist with ADL evaluation and planning for discharge  - Provide patient education as appropriate  Outcome: Progressing     Problem: DISCHARGE PLANNING  Goal: Discharge to home or other facility with appropriate resources  Description: INTERVENTIONS:  - Identify barriers to discharge w/patient and caregiver  - Arrange for needed discharge resources and transportation as appropriate  - Identify discharge learning needs (meds, wound care, etc.)  - Arrange for interpretive services to assist at discharge as needed  - Refer to Case Management Department for coordinating discharge planning if the patient needs post-hospital services based on physician/advanced practitioner order or complex needs related to functional status, cognitive ability, or social support system  Outcome: Progressing     Problem: Knowledge Deficit  Goal: Patient/family/caregiver demonstrates understanding of disease process, treatment plan, medications, and discharge instructions  Description: Complete learning assessment and assess knowledge base. Interventions:  - Provide teaching at level of understanding  - Provide teaching via preferred learning methods  Outcome: Progressing     Problem: Nutrition/Hydration-ADULT  Goal: Nutrient/Hydration intake appropriate for improving, restoring or maintaining nutritional needs  Description: Monitor and assess patient's nutrition/hydration status for malnutrition. Collaborate with interdisciplinary team and initiate plan and interventions as ordered. Monitor patient's weight and dietary intake as ordered or per policy. Utilize nutrition screening tool and intervene as necessary. Determine patient's food preferences and provide high-protein, high-caloric foods as appropriate.      INTERVENTIONS:  - Monitor oral intake, urinary output, labs, and treatment plans  - Assess nutrition and hydration status and recommend course of action  - Evaluate amount of meals eaten  - Assist patient with eating if necessary   - Allow adequate time for meals  - Recommend/ encourage appropriate diets, oral nutritional supplements, and vitamin/mineral supplements  - Order, calculate, and assess calorie counts as needed  - Recommend, monitor, and adjust tube feedings and TPN/PPN based on assessed needs  - Assess need for intravenous fluids  - Provide specific nutrition/hydration education as appropriate  - Include patient/family/caregiver in decisions related to nutrition  Outcome: Progressing     Problem: Prexisting or High Potential for Compromised Skin Integrity  Goal: Skin integrity is maintained or improved  Description: INTERVENTIONS:  - Identify patients at risk for skin breakdown  - Assess and monitor skin integrity  - Assess and monitor nutrition and hydration status  - Monitor labs   - Assess for incontinence   - Turn and reposition patient  - Assist with mobility/ambulation  - Relieve pressure over bony prominences  - Avoid friction and shearing  - Provide appropriate hygiene as needed including keeping skin clean and dry  - Evaluate need for skin moisturizer/barrier cream  - Collaborate with interdisciplinary team   - Patient/family teaching  - Consider wound care consult   Outcome: Progressing

## 2023-11-20 NOTE — PLAN OF CARE
Problem: PAIN - ADULT  Goal: Verbalizes/displays adequate comfort level or baseline comfort level  Description: Interventions:  - Encourage patient to monitor pain and request assistance  - Assess pain using appropriate pain scale  - Administer analgesics based on type and severity of pain and evaluate response  - Implement non-pharmacological measures as appropriate and evaluate response  - Consider cultural and social influences on pain and pain management  - Notify physician/advanced practitioner if interventions unsuccessful or patient reports new pain  Outcome: Progressing     Problem: DISCHARGE PLANNING  Goal: Discharge to home or other facility with appropriate resources  Description: INTERVENTIONS:  - Identify barriers to discharge w/patient and caregiver  - Arrange for needed discharge resources and transportation as appropriate  - Identify discharge learning needs (meds, wound care, etc.)  - Arrange for interpretive services to assist at discharge as needed  - Refer to Case Management Department for coordinating discharge planning if the patient needs post-hospital services based on physician/advanced practitioner order or complex needs related to functional status, cognitive ability, or social support system  Outcome: Progressing     Problem: Nutrition/Hydration-ADULT  Goal: Nutrient/Hydration intake appropriate for improving, restoring or maintaining nutritional needs  Description: Monitor and assess patient's nutrition/hydration status for malnutrition. Collaborate with interdisciplinary team and initiate plan and interventions as ordered. Monitor patient's weight and dietary intake as ordered or per policy. Utilize nutrition screening tool and intervene as necessary. Determine patient's food preferences and provide high-protein, high-caloric foods as appropriate.      INTERVENTIONS:  - Monitor oral intake, urinary output, labs, and treatment plans  - Assess nutrition and hydration status and recommend course of action  - Evaluate amount of meals eaten  - Assist patient with eating if necessary   - Allow adequate time for meals  - Recommend/ encourage appropriate diets, oral nutritional supplements, and vitamin/mineral supplements  - Order, calculate, and assess calorie counts as needed  - Recommend, monitor, and adjust tube feedings and TPN/PPN based on assessed needs  - Assess need for intravenous fluids  - Provide specific nutrition/hydration education as appropriate  - Include patient/family/caregiver in decisions related to nutrition  Outcome: Progressing     Problem: Prexisting or High Potential for Compromised Skin Integrity  Goal: Skin integrity is maintained or improved  Description: INTERVENTIONS:  - Identify patients at risk for skin breakdown  - Assess and monitor skin integrity  - Assess and monitor nutrition and hydration status  - Monitor labs   - Assess for incontinence   - Turn and reposition patient  - Assist with mobility/ambulation  - Relieve pressure over bony prominences  - Avoid friction and shearing  - Provide appropriate hygiene as needed including keeping skin clean and dry  - Evaluate need for skin moisturizer/barrier cream  - Collaborate with interdisciplinary team   - Patient/family teaching  - Consider wound care consult   Outcome: Progressing

## 2023-11-20 NOTE — BRIEF OP NOTE (RAD/CATH)
INTERVENTIONAL RADIOLOGY PROCEDURE NOTE    Date: 11/20/2023    Procedure: Right thoracentesis  Procedure Summary       Date:  Room / Location:     Anesthesia Start:  Anesthesia Stop:     Procedure:  Diagnosis:     Scheduled Providers:  Responsible Provider:     Anesthesia Type: Not recorded ASA Status: Not recorded            Preoperative diagnosis:   1. Pleural effusion on right    2. Pleural effusion    3. Generalized abdominal pain         Postoperative diagnosis: Same. Surgeon: Effie Hernandez MD     Assistant: None. No qualified resident was available. Blood loss: None    Specimens: 400 mL cloudy yellow pleural fluid removed. Findings: Successful right thoracentesis. Complications: None immediate.     Anesthesia: local

## 2023-11-20 NOTE — CASE MANAGEMENT
Case Management Discharge Planning Note    Patient name Mary Ann Ordonez  Location [address] [facility] [place] 303/-25 MRN [id number]  : 1955 Date 2023       Current Admission Date: 2023  Current Admission Diagnosis:Pleural effusion   Patient Active Problem List    Diagnosis Date Noted    Pleural effusion 2023    Moderate protein-calorie malnutrition ([address]) 2023    Abnormal CT scan 2023    Adrenal nodule ([address]) 2023    Respiratory insufficiency 2023    Rheumatoid arthritis ([address]) 2023      LOS (days): 2  Geometric Mean LOS (GMLOS) (days):   Days to GMLOS:     OBJECTIVE:  Risk of Unplanned Readmission Score: 13.31         Current admission status: Inpatient   Preferred Pharmacy:   [facility], [address]  Phone: [phone] Fax: [phone]    Primary Care Provider: No primary care provider on file. Primary Insurance: [employer] [facility] REP  Secondary Insurance:     DISCHARGE DETAILS:    Discharge planning discussed with[de-identified] patient at the bedside  Freedom of Choice: Yes  Comments - Freedom of Choice: FOC maintained during conversation.  Presented patient with 2 [address] agencies with availability, and patient decided on [facility].  CM contacted family/caregiver?: No- see comments (pt will update her friends and family herself)  Were Treatment Team discharge recommendations reviewed with patient/caregiver?: Yes     Were patient/caregiver advised of the risks associated with not following Treatment Team discharge recommendations?: Yes    Contacts  Patient Contacts: Lynford Holstein  Relationship to Patient[de-identified] 1 [facility]          Is the patient interested in [address] at discharge?: Yes  [facility] requested[de-identified] Nursing, Occupational Therapy, Physical [address] Name[de-identified] 11 [facility] Provider[de-identified] PCP         Other Referral/Resources/Interventions Provided:  Interventions: C  Referral Comments: [facility] confirmed in [facility] and added into Followup providers

## 2023-11-20 NOTE — PROGRESS NOTES
[address]  Progress Note  Name: Corey Buck  MRN: [id number]  Unit/Bed#: -01 I Date of Admission: 11/17/2023   Date of Service: 11/20/2023 I Hospital Day: 2    Assessment/Plan   Colitis-resolved as of 11/20/2023  Assessment & Plan  CT AP showing possible segmental colitis  Continue supportive care, low suspicion for acute bacterial diarrhea  Monitor off antibiotics   Stable, resolved    * Pleural effusion  Assessment & Plan  Incidental finding, asymptomatic without oxygen requirements  Unclear etiology, refer to CT imaging report  Low suspicion for acute bacterial pneumonia given absence of fevers, leukocytosis, and oxygen requirements  Pulmonology and IR consulted for further management  Status post thoracentesis with 400 cc fluid removed  Follow up fluid studies  Unable to reach grand daughter, and no other phone numbers in the chart  Dispo situation made difficult due to above, coordinate with     Rheumatoid arthritis ([address] [facility])  Assessment & Plan  Continue prednisone and Lyrica    Lethargy-resolved as of 11/20/2023  Assessment & Plan  Severe lethargy yesterday  Likely from colitis  Now resolved, at baseline mental status                VTE Pharmacologic Prophylaxis: VTE Score: 5 High Risk (Score >/= 5) - Pharmacological DVT Prophylaxis Ordered: heparin. Sequential Compression Devices Ordered. Mobility:   Basic Mobility Inpatient Raw Score: 22  JH-HLM Goal: 7: Walk 25 feet or more  JH-HLM Achieved: 6: Walk 10 steps or more  HLM Goal achieved. Continue to encourage appropriate mobility. Patient Centered Rounds: I performed bedside rounds with nursing staff today. Discussions with Specialists or Other Care Team Provider: pulmonology     Education and Discussions with Family / Patient: Attempted to update  (grand daughter) via phone. Unable to contact. Total Time Spent on Date of Encounter in care of patient: 30= mins.  This time was spent on one or more of the following: performing physical exam; counseling and coordination of care; obtaining or reviewing history; documenting in the medical record; reviewing/ordering tests, medications or procedures; communicating with other healthcare professionals and discussing with patient's family/caregivers. Current Length of Stay: 2 day(s)  Current Patient Status: Inpatient   Certification Statement: The patient will continue to require additional inpatient hospital stay due to dispo, unclear what dispo plan is, unable to reach any family   Discharge Plan:  to be determined. Medically stable for discharge. Code Status: Level 1 - Full Code    Subjective:   Patient feels well     Objective:     Vitals:   Temp (24hrs), Av.8 °F (36.6 °C), Min:97.4 °F (36.3 °C), Max:98 °F (36.7 °C)    Temp:  [97.4 °F (36.3 °C)-98 °F (36.7 °C)] 97.9 °F (36.6 °C)  HR:  [69-92] 87  Resp:  [18] 18  BP: (117-127)/(65-71) 117/71  SpO2:  [92 %-98 %] 94 %  Body mass index is 17.11 kg/m². Input and Output Summary (last 24 hours): Intake/Output Summary (Last 24 hours) at 2023 1033  Last data filed at 2023 1230  Gross per 24 hour   Intake 220 ml   Output --   Net 220 ml       Physical Exam:   Physical Exam  Constitutional:       General: She is not in acute distress. Appearance: Normal appearance. She is not toxic-appearing. Cardiovascular:      Rate and Rhythm: Normal rate and regular rhythm. Heart sounds: Normal heart sounds. No murmur heard. Pulmonary:      Effort: Pulmonary effort is normal. No respiratory distress. Breath sounds: Normal breath sounds. No wheezing. Abdominal:      General: Abdomen is flat. There is no distension. Palpations: Abdomen is soft. Tenderness: There is no abdominal tenderness. Neurological:      General: No focal deficit present. Mental Status: She is alert. Mental status is at baseline. Motor: No weakness.           Additional Data:     Labs:  Results from last 7 days   Lab Units 11/20/23  0453   WBC Thousand/uL 5.63   HEMOGLOBIN g/dL 11.2*   HEMATOCRIT % 36.4   PLATELETS Thousands/uL 257   NEUTROS PCT % 73   LYMPHS PCT % 16   MONOS PCT % 7   EOS PCT % 4     Results from last 7 days   Lab Units 11/20/23  0453   SODIUM mmol/L 145   POTASSIUM mmol/L 4.3   CHLORIDE mmol/L 110*   CO2 mmol/L 30   BUN mg/dL 11   CREATININE mg/dL 0.60   ANION GAP mmol/L 5   CALCIUM mg/dL 9.0   ALBUMIN g/dL 3.1*   TOTAL BILIRUBIN mg/dL 0.22   ALK PHOS U/L 90   ALT U/L 6*   AST U/L 10*   GLUCOSE RANDOM mg/dL 99     Results from last 7 days   Lab Units 11/18/23  0514   INR  1.16     Results from last 7 days   Lab Units 11/18/23  1723   POC GLUCOSE mg/dl 159*         Results from last 7 days   Lab Units 11/18/23  1853 11/17/23  1517   LACTIC ACID mmol/L  --  1.0   PROCALCITONIN ng/ml 0.07  --        Lines/Drains:  Invasive Devices       None                         Imaging: No pertinent imaging reviewed. Recent Cultures (last 7 days):         Last 24 Hours Medication List:   Current Facility-Administered Medications   Medication Dose Route Frequency Provider Last Rate    acetaminophen  650 mg Oral Q6H PRN Joshua Aburto MD      heparin (porcine)  5,000 Units Subcutaneous Q8H Amelia Shrestha MD      nicotine  1 patch Transdermal Daily Joshua Aburto MD      ondansetron  4 mg Intravenous Q6H PRN Joshua Aburto MD      predniSONE  5 mg Oral BID With Meals Joshua Aburto MD      pregabalin  150 mg Oral BID Manav Allen MD          Today, Patient Was Seen By: David Noble MD    **Please Note: This note may have been constructed using a voice recognition system. **

## 2023-11-20 NOTE — PROGRESS NOTES
Spiritual Care Progress Note    2023  Patient: Mamta Richards : 1955  Admission Date & Time: 2023 1414  MRN: [id number] CSN: [id number]    [facility]  [facility], provided communion.      23 1000   Clinical Encounter Type   Visited With Patient   Routine Visit Introduction   Referral From Other (Comment)  ([address] [facility])   [address] [facility] Yes

## 2023-11-21 ENCOUNTER — APPOINTMENT (INPATIENT)
Dept: CT IMAGING | Facility: HOSPITAL | Age: 68
DRG: 546 | End: 2023-11-21
Payer: COMMERCIAL

## 2023-11-21 LAB
ALBUMIN SERPL BCP-MCNC: 3.1 G/DL (ref 3.5–5)
ALP SERPL-CCNC: 72 U/L (ref 34–104)
ALT SERPL W P-5'-P-CCNC: 6 U/L (ref 7–52)
ANION GAP SERPL CALCULATED.3IONS-SCNC: 4 MMOL/L
AST SERPL W P-5'-P-CCNC: 14 U/L (ref 13–39)
BILIRUB SERPL-MCNC: 0.24 MG/DL (ref 0.2–1)
BUN SERPL-MCNC: 13 MG/DL (ref 5–25)
CALCIUM ALBUM COR SERPL-MCNC: 9.7 MG/DL (ref 8.3–10.1)
CALCIUM SERPL-MCNC: 9 MG/DL (ref 8.4–10.2)
CHLORIDE SERPL-SCNC: 109 MMOL/L (ref 96–108)
CO2 SERPL-SCNC: 30 MMOL/L (ref 21–32)
CREAT SERPL-MCNC: 0.59 MG/DL (ref 0.6–1.3)
GFR SERPL CREATININE-BSD FRML MDRD: 94 ML/MIN/1.73SQ M
GLUCOSE SERPL-MCNC: 107 MG/DL (ref 65–140)
MAGNESIUM SERPL-MCNC: 2.1 MG/DL (ref 1.9–2.7)
POTASSIUM SERPL-SCNC: 4.9 MMOL/L (ref 3.5–5.3)
PROT SERPL-MCNC: 6.5 G/DL (ref 6.4–8.4)
SODIUM SERPL-SCNC: 143 MMOL/L (ref 135–147)

## 2023-11-21 PROCEDURE — 99232 SBSQ HOSP IP/OBS MODERATE 35: CPT | Performed by: INTERNAL MEDICINE

## 2023-11-21 PROCEDURE — 80053 COMPREHEN METABOLIC PANEL: CPT | Performed by: STUDENT IN AN ORGANIZED HEALTH CARE EDUCATION/TRAINING PROGRAM

## 2023-11-21 PROCEDURE — 83735 ASSAY OF MAGNESIUM: CPT | Performed by: STUDENT IN AN ORGANIZED HEALTH CARE EDUCATION/TRAINING PROGRAM

## 2023-11-21 PROCEDURE — 71250 CT THORAX DX C-: CPT

## 2023-11-21 PROCEDURE — G1004 CDSM NDSC: HCPCS

## 2023-11-21 PROCEDURE — NC001 PR NO CHARGE: Performed by: RADIOLOGY

## 2023-11-21 RX ADMIN — IOHEXOL 100 ML: 350 INJECTION, SOLUTION INTRAVENOUS at 10:36

## 2023-11-21 RX ADMIN — HEPARIN SODIUM 5000 UNITS: 5000 INJECTION INTRAVENOUS; SUBCUTANEOUS at 21:25

## 2023-11-21 RX ADMIN — PREDNISONE 5 MG: 5 TABLET ORAL at 17:18

## 2023-11-21 RX ADMIN — HEPARIN SODIUM 5000 UNITS: 5000 INJECTION INTRAVENOUS; SUBCUTANEOUS at 05:00

## 2023-11-21 RX ADMIN — PREGABALIN 150 MG: 75 CAPSULE ORAL at 09:17

## 2023-11-21 RX ADMIN — ACETAMINOPHEN 650 MG: 325 TABLET, FILM COATED ORAL at 09:17

## 2023-11-21 RX ADMIN — PREGABALIN 150 MG: 75 CAPSULE ORAL at 17:18

## 2023-11-21 RX ADMIN — PREDNISONE 5 MG: 5 TABLET ORAL at 09:20

## 2023-11-21 NOTE — PROGRESS NOTES
Progress Note - Pulmonary   Isis Grier 76 y.o. female MRN: [id number]  Unit/Bed#: -01 Encounter: [id number]    Assessment:  Acute hypoxemic pulmonary insufficiency  Abnormal chest CT with RML/RLL atelectasis and loculated right pleural effusion  Rheumatoid arthritis  Nicotine dependence    Plan:  Acute hypoxemic pulmonary insufficiency with loculated right-sided pleural effusion  ? Parapneumonic effusion related to possible recent aspiration event, related to her RA though less likely given the fluid characteristics  Thoracentesis was done yesterday, fluid is neutrophilic predominant, exudative by lights criteria  Repeat chest x-ray still shows sizable amount of fluid, there were loculations on recent CT scan  Spoke with patient about having a chest tube placed in order to completely evacuate effusion, may need tPA/dornase  Will follow up once chest tube is in place    Subjective:   Patient resting in bed. She is still feeling some discomfort on the right side of her back, still with some shortness of breath. Objective:     Vitals: Blood pressure 101/61, pulse 72, temperature 98.2 °F (36.8 °C), resp. rate 18, height 5' 3" (1.6 m), weight 43.8 kg (96 lb 9 oz), SpO2 95 %. ,Body mass index is 17.11 kg/m².       Intake/Output Summary (Last 24 hours) at 11/21/2023 1126  Last data filed at 11/21/2023 0917  Gross per 24 hour   Intake --   Output 300 ml   Net -300 ml       Invasive Devices       Peripheral Intravenous Line  Duration             Long-Dwell Peripheral IV (Midline) 20/26/84 Right Basilic <1 day                    Physical Exam: /61   Pulse 72   Temp 98.2 °F (36.8 °C)   Resp 18   Ht 5' 3" (1.6 m)   Wt 43.8 kg (96 lb 9 oz)   SpO2 95%   BMI 17.11 kg/m²   General appearance: alert and oriented, in no acute distress  Head: Normocephalic, without obvious abnormality, atraumatic  Eyes: negative findings: conjunctivae and sclerae normal  Lungs:  diminished right base  Heart: regular rate and rhythm  Abdomen: normal findings: soft, non-tender  Extremities:  no edema  Skin:  warm and dry  Neurologic: Mental status: Alert, oriented, thought content appropriate     Labs: I have personally reviewed pertinent lab results. , CBC: No results found for: "WBC", "HGB", "HCT", "MCV", "PLT", "ADJUSTEDWBC", "RBC", "MCH", "MCHC", "RDW", "MPV", "NRBC", CMP:   Lab Results   Component Value Date    SODIUM 143 11/21/2023    K 4.9 11/21/2023     (H) 11/21/2023    CO2 30 11/21/2023    BUN 13 11/21/2023    CREATININE 0.59 (L) 11/21/2023    CALCIUM 9.0 11/21/2023    AST 14 11/21/2023    ALT 6 (L) 11/21/2023    ALKPHOS 72 11/21/2023    EGFR 94 11/21/2023     Imaging and other studies: I have personally reviewed pertinent reports.    and I have personally reviewed pertinent films in PACS

## 2023-11-21 NOTE — NURSING NOTE
Placed call to speak with Linsey--the patient's grandaueleanorter--via number provided ([phone]) to provide update however this phone actually belongs to the patient. The phone is in possession at this time by an individual the patient has been residing with by the name [facility]. Jeimy Baxter stated the patient cannot return to this residence; in her opinion the patient needs to be in a long term care facility and she cannot continue to provide care for the patient. I was unable to reach granddaughter Merlin Side with number provided in chart ([phone]). Number is not receiving calls at this time.  Will discuss with provider and case management this am. Pt. c/o chest pain 8/10 to left side. Pt. c/o 8/10 left sided chest pain and SOB.

## 2023-11-21 NOTE — PLAN OF CARE
Problem: PAIN - ADULT  Goal: Verbalizes/displays adequate comfort level or baseline comfort level  Description: Interventions:  - Encourage patient to monitor pain and request assistance  - Assess pain using appropriate pain scale  - Administer analgesics based on type and severity of pain and evaluate response  - Implement non-pharmacological measures as appropriate and evaluate response  - Consider cultural and social influences on pain and pain management  - Notify physician/advanced practitioner if interventions unsuccessful or patient reports new pain  Outcome: Progressing     Problem: INFECTION - ADULT  Goal: Absence or prevention of progression during hospitalization  Description: INTERVENTIONS:  - Assess and monitor for signs and symptoms of infection  - Monitor lab/diagnostic results  - Monitor all insertion sites, i.e. indwelling lines, tubes, and drains  - Monitor endotracheal if appropriate and nasal secretions for changes in amount and color  - [place] appropriate cooling/warming therapies per order  - Administer medications as ordered  - Instruct and encourage patient and family to use good hand hygiene technique  - Identify and instruct in appropriate isolation precautions for identified infection/condition  Outcome: Progressing  Goal: Absence of fever/infection during neutropenic period  Description: INTERVENTIONS:  - Monitor WBC    Outcome: Progressing     Problem: SAFETY ADULT  Goal: Maintain or return to baseline ADL function  Description: INTERVENTIONS:  -  Assess patient's ability to carry out ADLs; assess patient's baseline for ADL function and identify physical deficits which impact ability to perform ADLs (bathing, care of mouth/teeth, toileting, grooming, dressing, etc.)  - Assess/evaluate cause of self-care deficits   - Assess range of motion  - Assess patient's mobility; develop plan if impaired  - Assess patient's need for assistive devices and provide as appropriate  - Encourage maximum independence but intervene and supervise when necessary  - Involve family in performance of ADLs  - Assess for home care needs following discharge   - Consider OT consult to assist with ADL evaluation and planning for discharge  - Provide patient education as appropriate  Outcome: Progressing     Problem: Knowledge Deficit  Goal: Patient/family/caregiver demonstrates understanding of disease process, treatment plan, medications, and discharge instructions  Description: Complete learning assessment and assess knowledge base. Interventions:  - Provide teaching at level of understanding  - Provide teaching via preferred learning methods  Outcome: Progressing     Problem: Nutrition/Hydration-ADULT  Goal: Nutrient/Hydration intake appropriate for improving, restoring or maintaining nutritional needs  Description: Monitor and assess patient's nutrition/hydration status for malnutrition. Collaborate with interdisciplinary team and initiate plan and interventions as ordered. Monitor patient's weight and dietary intake as ordered or per policy. Utilize nutrition screening tool and intervene as necessary. Determine patient's food preferences and provide high-protein, high-caloric foods as appropriate.      INTERVENTIONS:  - Monitor oral intake, urinary output, labs, and treatment plans  - Assess nutrition and hydration status and recommend course of action  - Evaluate amount of meals eaten  - Assist patient with eating if necessary   - Allow adequate time for meals  - Recommend/ encourage appropriate diets, oral nutritional supplements, and vitamin/mineral supplements  - Order, calculate, and assess calorie counts as needed  - Recommend, monitor, and adjust tube feedings and TPN/PPN based on assessed needs  - Assess need for intravenous fluids  - Provide specific nutrition/hydration education as appropriate  - Include patient/family/caregiver in decisions related to nutrition  Outcome: Progressing     Problem: Prexisting or High Potential for Compromised Skin Integrity  Goal: Skin integrity is maintained or improved  Description: INTERVENTIONS:  - Identify patients at risk for skin breakdown  - Assess and monitor skin integrity  - Assess and monitor nutrition and hydration status  - Monitor labs   - Assess for incontinence   - Turn and reposition patient  - Assist with mobility/ambulation  - Relieve pressure over bony prominences  - Avoid friction and shearing  - Provide appropriate hygiene as needed including keeping skin clean and dry  - Evaluate need for skin moisturizer/barrier cream  - Collaborate with interdisciplinary team   - Patient/family teaching  - Consider wound care consult   Outcome: Progressing

## 2023-11-21 NOTE — PROGRESS NOTES
[address]  Progress Note  Name: Gertrudis Hampton  MRN: [id number]  Unit/Bed#: -01 I Date of Admission: 11/17/2023   Date of Service: 11/21/2023 I Hospital Day: 3    Assessment/Plan   Rheumatoid arthritis ([address])  Assessment & Plan  Continue prednisone and Lyrica. * Pleural effusion  Assessment & Plan  Incidental finding, asymptomatic without oxygen requirements  Unclear etiology, refer to CT imaging report  Low suspicion for acute bacterial pneumonia given absence of fevers, leukocytosis, and oxygen requirements  Pulmonology and IR consulted for further management  Status post thoracentesis with 400 cc fluid removed  Follow up fluid studies  Given loculated nature of the effusion, may need thoracentesis again with chest tube placement. Input from pulmonary and interventional radiology teams personally appreciated           TE Pharmacologic Prophylaxis: Heparin    Patient Centered Rounds: I have performed bedside rounds with nursing staff today. Discussions with Specialists or Other Care Team Provider: Pulmonology and interventional radiology  Education and Discussions with Family / Patient: Patient    Current Length of Stay: 3 day(s)  Current Patient Status: Inpatient     Certification Statement: The patient will continue to require additional inpatient hospital stay due to Pleural effusion  Discharge Plan / Estimated Discharge Date: 2-3 more days    Code Status: Level 1 - Full Code  ______________________________________________________________________________    Subjective:   Patient seen and examined by me. No chest pain, palpitations or diaphoresis. No fever or chills. Does have weakness and the weakness is generalized. No nausea or vomiting    Objective:   Vitals: Blood pressure 99/61, pulse 72, temperature 98.1 °F (36.7 °C), resp. rate 18, height 5' 3" (1.6 m), weight 43.8 kg (96 lb 9 oz), SpO2 95 %.     Physical Exam:   General appearance: alert, fatigued, appears stated age, and cooperative  Constitutional- looks a little weak  HEENT - atraumatic and normocephalic  Neck- supple  Skin - no fresh rash  Extremities no fresh focal deformities  Cardiovascular- S1-S2 heard  Respiratory- bilateral air entry present, no crackles or rhonchi  Skin - no fresh rash  Abdomen - normal bowel sounds present, no rebound tenderness  CNS- No fresh focal deficits  Psych- no acute psychosis     Additional Data:   Labs:  Results from last 7 days   Lab Units 11/20/23 0453 11/19/23  0936 11/18/23  0514 11/17/23  1517   WBC Thousand/uL 5.63 5.65 5.30 8.02   HEMOGLOBIN g/dL 11.2* 11.3* 11.6 13.1   HEMATOCRIT % 36.4 36.6 37.2 41.5   MCV fL 98 98 96 95   PLATELETS Thousands/uL 257 260 275 333   INR   --   --  1.16  --      Results from last 7 days   Lab Units 11/21/23 0452 11/20/23 0453 11/19/23  0936 11/18/23  0514 11/17/23  1517   SODIUM mmol/L 143 145 141 140 139   POTASSIUM mmol/L 4.9 4.3 4.6 4.2 4.7   CHLORIDE mmol/L 109* 110* 110* 106 104   CO2 mmol/L 30 30 24 27 27   ANION GAP mmol/L 4 5 7 7 8   BUN mg/dL 13 11 12 12 18   CREATININE mg/dL 0.59* 0.60 0.61 0.63 0.59*   CALCIUM mg/dL 9.0 9.0 8.9 9.1 9.4   ALBUMIN g/dL 3.1* 3.1* 3.4* 2.9* 3.3*   TOTAL BILIRUBIN mg/dL 0.24 0.22 0.25 0.47 0.60   ALK PHOS U/L 72 90 92 81 102   ALT U/L 6* 6* 5* 6* 9   AST U/L 14 10* 12* 12* 23   EGFR ml/min/1.73sq m 94 93 93 92 94   GLUCOSE RANDOM mg/dL 107 99 145* 84 116     Results from last 7 days   Lab Units 11/21/23 0452 11/20/23 0453 11/19/23  0936   MAGNESIUM mg/dL 2.1 2.0 1.9              Results from last 7 days   Lab Units 11/18/23  1853 11/17/23  1517   LACTIC ACID mmol/L  --  1.0   PROCALCITONIN ng/ml 0.07  --      Results from last 7 days   Lab Units 11/18/23  1723   POC GLUCOSE mg/dl 159*             * I Have Reviewed All Lab Data Listed Above.     Cultures:   Results from last 7 days   Lab Units 11/20/23  0750 11/17/23  1449   GRAM STAIN RESULT  Rare Polys  No bacteria seen  --    BODY FLUID CULTURE, STERILE  No growth  --    INFLUENZA A PCR   --  Negative     Results from last 7 days   Lab Units 11/17/23  1449   INFLUENZA A PCR  Negative   INFLUENZA B PCR  Negative   RSV PCR  Negative         Imaging:  Imaging Reports Reviewed Today Include:   CT chest wo contrast    Result Date: 11/21/2023  Impression: Small loculated right pleural effusion, slightly decreased from 11/17/2023. Trace left pleural effusion, slightly increased from 11/17/2023. Mild dependent tree-in-bud nodularity in both upper lobes, bronchiolitis versus aspiration. Improved atelectasis in the right lung. Redemonstration of 1.4 cm low-attenuation right lower lobe nodule. As previously recommended, follow-up can be obtained with a noncontrasted abdominal CT or MRI in 12 months with biochemical evaluation to rule out functioning adenoma if not already performed. Workstation performed: [id number]     IR IN-Patient Thoracentesis    Result Date: 11/20/2023  Impression: Ultrasound-guided right thoracentesis with drainage of 400 mL of cloudy yellow pleural fluid. Plan: Resume care by clinical team. Workstation performed: [id number]     CT chest abdomen pelvis w contrast    Result Date: 11/17/2023  Impression: Increasing loculated effusion in the right mid and lower chest. Atelectasis in the right chest has progressed to an even greater degree than pleural effusions and there is now slight rightward shift of the heart and mediastinal structures. Otherwise no significant interval change. Osteopenia with numerous healed chronic fractures reidentified. Reidentified circumscribed 14 mm right adrenal nodule. Although its imaging features are indeterminate, it does not have suspicious imaging features (heterogeneity, necrosis, irregular margins), therefore this is likely benign, and can be followed by non-contrast abdomen CT or MRI in 12 months.  Please note that for adrenal nodule > 1cm,  biochemical evaluation is suggested to rule out functioning adenoma, if not already performed. Adrenal recommendation based on institutional consensus and Journal of [employer] of Radiology 8249;12:9371-7749 Workstation performed: [id number]     CT head without contrast    Result Date: 11/17/2023  Impression: No acute intracranial abnormality. Workstation performed: [id number]     Scheduled Meds:  Current Facility-Administered Medications   Medication Dose Route Frequency Provider Last Rate    acetaminophen  650 mg Oral Q6H PRN Joshua Aburto MD      heparin (porcine)  5,000 Units Subcutaneous Q8H Laura Martino MD      nicotine  1 patch Transdermal Daily Joshua Aburto MD      ondansetron  4 mg Intravenous Q6H PRN Joshua Aburto MD      predniSONE  5 mg Oral BID With Meals Lianne Crawford MD      pregabalin  150 mg Oral BID MD Minal Cummings MD  [facility] Internal Medicine    ** Please Note: This note has been constructed using a voice recognition system.  **

## 2023-11-21 NOTE — CONSULTS
e-Consult (IPC)  - Interventional Radiology  Evi Wells 76 y.o. female MRN: [id number]  Unit/Bed#: -01 Encounter: [id number]    Interventional Radiology has been consulted to evaluate Evi Wells    We were consulted by Dr. Sangita Bass concerning this patient with shortness of breath. Inpatient Consult to IR  Consult performed by: Valentina Vázquez MD  Consult ordered by: Jourdan Mcallister PA-C        11/21/23    Assessment/Recommendation:   Patient admitted with hypoxia and found to have a loculated right pleural effusion likely parapneumonic. IR performed right thoracentesis on 11/20 for 400cc of cloudy fluid. Pulmonary medicine has consulted IR now for a chest tube. I performed a CT today showing a small right pleural effusion. After discussion with Dr. Sangita Bass, the effusion appears too small to place a chest tube in today but if it becomes larger either tomorrow or Friday, we could place a chest tube to completely drain the cloudy loculated effusion. We will assess the effusion by CXR daily and place the chest tube when and if the effusion becomes larger. 21-30 minutes, >50% of the total time devoted to medical consultative verbal/EMR discussion between providers. Written report will be generated in the EMR. Thank you for allowing Interventional Radiology to participate in the care of Evi Wells. Please don't hesitate to call or TigerText us with any questions.      Valentina Vázquez MD

## 2023-11-21 NOTE — ASSESSMENT & PLAN NOTE
Incidental finding, asymptomatic without oxygen requirements  Unclear etiology, refer to CT imaging report  Low suspicion for acute bacterial pneumonia given absence of fevers, leukocytosis, and oxygen requirements  Pulmonology and IR consulted for further management  Status post thoracentesis with 400 cc fluid removed  Follow up fluid studies  Given loculated nature of the effusion, may need thoracentesis again with chest tube placement.   Input from pulmonary and interventional radiology teams personally appreciated

## 2023-11-22 ENCOUNTER — APPOINTMENT (INPATIENT)
Dept: RADIOLOGY | Facility: HOSPITAL | Age: 68
DRG: 546 | End: 2023-11-22
Payer: COMMERCIAL

## 2023-11-22 PROBLEM — R10.9 ABDOMINAL PAIN: Status: ACTIVE | Noted: 2023-11-22

## 2023-11-22 PROBLEM — D86.9 SARCOIDOSIS: Status: ACTIVE | Noted: 2023-11-22

## 2023-11-22 LAB
ANION GAP SERPL CALCULATED.3IONS-SCNC: 5 MMOL/L
BASOPHILS # BLD AUTO: 0.03 THOUSANDS/ÂΜL (ref 0–0.1)
BASOPHILS NFR BLD AUTO: 0 % (ref 0–1)
BUN SERPL-MCNC: 13 MG/DL (ref 5–25)
CALCIUM SERPL-MCNC: 9.3 MG/DL (ref 8.4–10.2)
CHLORIDE SERPL-SCNC: 106 MMOL/L (ref 96–108)
CO2 SERPL-SCNC: 32 MMOL/L (ref 21–32)
CREAT SERPL-MCNC: 0.62 MG/DL (ref 0.6–1.3)
EOSINOPHIL # BLD AUTO: 0.37 THOUSAND/ÂΜL (ref 0–0.61)
EOSINOPHIL NFR BLD AUTO: 4 % (ref 0–6)
ERYTHROCYTE [DISTWIDTH] IN BLOOD BY AUTOMATED COUNT: 14.8 % (ref 11.6–15.1)
GFR SERPL CREATININE-BSD FRML MDRD: 92 ML/MIN/1.73SQ M
GLUCOSE SERPL-MCNC: 98 MG/DL (ref 65–140)
HCT VFR BLD AUTO: 37.6 % (ref 34.8–46.1)
HGB BLD-MCNC: 11.4 G/DL (ref 11.5–15.4)
IMM GRANULOCYTES # BLD AUTO: 0.06 THOUSAND/UL (ref 0–0.2)
IMM GRANULOCYTES NFR BLD AUTO: 1 % (ref 0–2)
LYMPHOCYTES # BLD AUTO: 1.53 THOUSANDS/ÂΜL (ref 0.6–4.47)
LYMPHOCYTES NFR BLD AUTO: 15 % (ref 14–44)
MCH RBC QN AUTO: 29.5 PG (ref 26.8–34.3)
MCHC RBC AUTO-ENTMCNC: 30.3 G/DL (ref 31.4–37.4)
MCV RBC AUTO: 97 FL (ref 82–98)
MONOCYTES # BLD AUTO: 0.72 THOUSAND/ÂΜL (ref 0.17–1.22)
MONOCYTES NFR BLD AUTO: 7 % (ref 4–12)
NEUTROPHILS # BLD AUTO: 7.41 THOUSANDS/ÂΜL (ref 1.85–7.62)
NEUTS SEG NFR BLD AUTO: 73 % (ref 43–75)
NRBC BLD AUTO-RTO: 0 /100 WBCS
PLATELET # BLD AUTO: 213 THOUSANDS/UL (ref 149–390)
PMV BLD AUTO: 11.2 FL (ref 8.9–12.7)
POTASSIUM SERPL-SCNC: 4.6 MMOL/L (ref 3.5–5.3)
RBC # BLD AUTO: 3.86 MILLION/UL (ref 3.81–5.12)
SODIUM SERPL-SCNC: 143 MMOL/L (ref 135–147)
WBC # BLD AUTO: 10.12 THOUSAND/UL (ref 4.31–10.16)

## 2023-11-22 PROCEDURE — 71045 X-RAY EXAM CHEST 1 VIEW: CPT

## 2023-11-22 PROCEDURE — 99232 SBSQ HOSP IP/OBS MODERATE 35: CPT | Performed by: INTERNAL MEDICINE

## 2023-11-22 PROCEDURE — 85025 COMPLETE CBC W/AUTO DIFF WBC: CPT | Performed by: INTERNAL MEDICINE

## 2023-11-22 PROCEDURE — 80048 BASIC METABOLIC PNL TOTAL CA: CPT | Performed by: INTERNAL MEDICINE

## 2023-11-22 RX ORDER — KETOROLAC TROMETHAMINE 30 MG/ML
15 INJECTION, SOLUTION INTRAMUSCULAR; INTRAVENOUS EVERY 6 HOURS SCHEDULED
Status: COMPLETED | OUTPATIENT
Start: 2023-11-23 | End: 2023-11-23

## 2023-11-22 RX ADMIN — HEPARIN SODIUM 5000 UNITS: 5000 INJECTION INTRAVENOUS; SUBCUTANEOUS at 21:13

## 2023-11-22 RX ADMIN — HEPARIN SODIUM 5000 UNITS: 5000 INJECTION INTRAVENOUS; SUBCUTANEOUS at 15:50

## 2023-11-22 RX ADMIN — PREDNISONE 5 MG: 5 TABLET ORAL at 15:50

## 2023-11-22 RX ADMIN — KETOROLAC TROMETHAMINE 15 MG: 30 INJECTION, SOLUTION INTRAMUSCULAR; INTRAVENOUS at 23:50

## 2023-11-22 RX ADMIN — PREDNISONE 5 MG: 5 TABLET ORAL at 08:27

## 2023-11-22 RX ADMIN — HEPARIN SODIUM 5000 UNITS: 5000 INJECTION INTRAVENOUS; SUBCUTANEOUS at 05:16

## 2023-11-22 RX ADMIN — PREGABALIN 150 MG: 75 CAPSULE ORAL at 18:41

## 2023-11-22 RX ADMIN — PREGABALIN 150 MG: 75 CAPSULE ORAL at 08:27

## 2023-11-22 NOTE — ASSESSMENT & PLAN NOTE
Chronic history, likely non-contributory to pulmonary exudate  -methotrexate use for a few years but quit a year ago, does not fit timeline of when respiratory problems developed   -continue prednisone and pregabalin

## 2023-11-22 NOTE — PROGRESS NOTES
Progress Note - Pulmonary   Magaly Goldman 76 y.o. female MRN: [id number]  Unit/Bed#: -01 Encounter: [id number]    Assessment:  Acute hypoxemic pulmonary insufficiency  Abnormal chest CT with RML/RLL atelectasis and loculated right pleural effusion  Rheumatoid arthritis  Nicotine dependence    Plan:  Acute hypoxemic respiratory insufficiency with loculated right sided exudative effusion  Suspect parapneumonic related to possible aspiration during her recent illness with nausea/vomiting and encephalopathy  Repeat imaging shows still with small loculated effusion - was not enough for chest tube yesterday per IR  Repeat CXR to be done today to see if any reaccumulation of the fluid. If still small amount, will plan for repeat CXR on Friday and hopeful repeat thoracentesis and possible chest tube  Will see patient again on 11/24/23    Subjective:   Patient resting in bed. She is still having some shortness of breath and back discomfort. Objective:     Vitals: Blood pressure 115/66, pulse 73, temperature 97.7 °F (36.5 °C), resp. rate 18, height 5' 3" (1.6 m), weight 43.8 kg (96 lb 9 oz), SpO2 92 %. ,Body mass index is 17.11 kg/m².       Intake/Output Summary (Last 24 hours) at 11/22/2023 1107  Last data filed at 11/22/2023 0856  Gross per 24 hour   Intake 240 ml   Output 200 ml   Net 40 ml       Invasive Devices       Peripheral Intravenous Line  Duration             Long-Dwell Peripheral IV (Midline) 48/18/41 Right Basilic 1 day                    Physical Exam: /66   Pulse 73   Temp 97.7 °F (36.5 °C)   Resp 18   Ht 5' 3" (1.6 m)   Wt 43.8 kg (96 lb 9 oz)   SpO2 92%   BMI 17.11 kg/m²   General appearance: alert and oriented, in no acute distress  Head: Normocephalic, without obvious abnormality, atraumatic  Eyes: negative findings: conjunctivae and sclerae normal  Lungs:  diminished right base  Heart: regular rate and rhythm  Abdomen: normal findings: soft, non-tender  Extremities:  no edema  Skin: warm and dry  Neurologic: Mental status: Alert, oriented, thought content appropriate     Labs: I have personally reviewed pertinent lab results. , CBC:   Lab Results   Component Value Date    WBC 10.12 11/22/2023    HGB 11.4 (L) 11/22/2023    HCT 37.6 11/22/2023    MCV 97 11/22/2023     11/22/2023    RBC 3.86 11/22/2023    MCH 29.5 11/22/2023    MCHC 30.3 (L) 11/22/2023    RDW 14.8 11/22/2023    MPV 11.2 11/22/2023    NRBC 0 11/22/2023   , CMP:   Lab Results   Component Value Date    SODIUM 143 11/22/2023    K 4.6 11/22/2023     11/22/2023    CO2 32 11/22/2023    BUN 13 11/22/2023    CREATININE 0.62 11/22/2023    CALCIUM 9.3 11/22/2023    EGFR 92 11/22/2023     Imaging and other studies: I have personally reviewed pertinent reports.    and I have personally reviewed pertinent films in PACS

## 2023-11-22 NOTE — ASSESSMENT & PLAN NOTE
New loculated pleural effusion found on CT   -recently d/c 11/14 after being managed for abdominal pain and respiratory distress, current presentation patient is overall asymptomatic from respiratory complaints  -Pt reports having smaller effusion of left lobe about 2 years ago with thoracentesis, and she was similarly not having respiratory problems  -pt notes she has been hospitalizes 4 times since July of this year, having on/off respiratory and GI problems    -s/p thoracentesis of R lobe 11/20 with 400ml exudative neutrophil-predominant fluid  -- likely due to aspiration event in setting of vomitting  -small fluid collection still lingering - appears to be stable, and IR says too small to warrant chest tube placement  -does endorse slightly increased dyspnea - oxygen saturation improving    -CXR today stable, will monitor Friday and consider chest tube vs thoracentesis if bigger  -Pt complaining of back pain - says has been going on for a few months  -she states she is upset with being hospitalized multiple times recently and no one finding out what is going on.   -talked to patient extensively about the effusion and potential causes, recommended workup outpatient given recurrence of symptoms  -pulmonology thinks effusion likely due to aspiration event.  Input on possible development chronic lung disease and need for workup.  -Referral to op pulmonology on d/c - sarcoidosis for 20 years, RA - consider workup for underlying cause of recent pulmonary issues causing hospitalization

## 2023-11-22 NOTE — PROGRESS NOTES
[address]  Progress Note  Name: Jose Martin Holcomb  MRN: [id number]  Unit/Bed#: -01 I Date of Admission: 11/17/2023   Date of Service: 11/22/2023 I Hospital Day: 4    Assessment/Plan   Sarcoidosis  Assessment & Plan  Pt notes 20 year history of sarcoidosis  -evidence of pulmonary changes typical of sarcoidosis can be seen on CT last visit  -has not followed up outpatient with pulmonology  -likely non-contributory to current disease, pulm following ip    Rheumatoid arthritis ([address])  Assessment & Plan  Chronic history, likely non-contributory to pulmonary exudate  -methotrexate use for a few years but quit a year ago, does not fit timeline of when respiratory problems developed   -continue prednisone and pregabalin      * Pleural effusion  Assessment & Plan  New loculated pleural effusion found on CT   -recently d/c 11/14 after being managed for abdominal pain and respiratory distress, current presentation patient is overall asymptomatic from respiratory complaints  -Pt reports having smaller effusion of left lobe about 2 years ago with thoracentesis, and she was similarly not having respiratory problems  -pt notes she has been hospitalizes 4 times since July of this year, having on/off respiratory and GI problems    -s/p thoracentesis of R lobe 11/20 with 400ml exudative neutrophil-predominant fluid  -- likely due to aspiration event in setting of vomitting  -small fluid collection still lingering - appears to be stable, and IR says too small to warrant chest tube placement  -does endorse slightly increased dyspnea - oxygen saturation improving    -CXR today stable, will monitor Friday and consider chest tube vs thoracentesis if bigger  -Pt complaining of back pain - says has been going on for a few months  -she states she is upset with being hospitalized multiple times recently and no one finding out what is going on.   -talked to patient extensively about the effusion and potential causes, recommended workup outpatient given recurrence of symptoms  -pulmonology thinks effusion likely due to aspiration event. Input on possible development chronic lung disease and need for workup.  -Referral to op pulmonology on d/c - sarcoidosis for 20 years, RA - consider workup for underlying cause of recent pulmonary issues causing hospitalization      Moderate protein calorie malnutrition-high-protein diet advised           VTE Pharmacologic Prophylaxis: VTE Score: 5 High Risk (Score >/= 5) - Pharmacological DVT Prophylaxis Ordered: heparin. Sequential Compression Devices Ordered. **Offered to update family, pt said only if they call     Patient Centered Rounds: I performed bedside rounds with nursing staff today. Discussions with Specialists or Other Care Team Provider: pulmonology and IR      Total Time Spent on Date of Encounter in care of patient: 45 mins. This time was spent on one or more of the following: performing physical exam; counseling and coordination of care; obtaining or reviewing history; documenting in the medical record; reviewing/ordering tests, medications or procedures; communicating with other healthcare professionals and discussing with patient's family/caregivers. Current Length of Stay: 4 day(s)  Current Patient Status: Inpatient   Certification Statement: The patient will continue to require additional inpatient hospital stay due to management of pulmonary effusion  Discharge Plan: Anticipate discharge in 48 hrs to home. Code Status: Level 1 - Full Code    Subjective:   75 yo female with a history of RA and nicotine use who presented with complaints of N/V/D for 5 days. Recently discharged a week before after respiratory complaints and abdominal pain. She also noted generalized weakness and subjective fevers. CT showed loculated pulmonary effusion, but patient had no respiratory complaints at the time. She is now s/p thoracentesis. Today she reports feeling okay. She does note mildly increasing dyspnea. Also says abdominal pain is about the same, but has not had a BM or passed flatus in 3 days. No sharp abdominal pains, and no localization of pain. Also some new back pain, no CVA tenderness, paraspinal tenderness and no radiation, likely MSK. Denies chest pain, f/c, n/v, palpitations. Objective:     Vitals:   Temp (24hrs), Av.8 °F (36.6 °C), Min:97.7 °F (36.5 °C), Max:97.8 °F (36.6 °C)    Temp:  [97.7 °F (36.5 °C)-97.8 °F (36.6 °C)] 97.8 °F (36.6 °C)  HR:  [73-88] 88  Resp:  [18] 18  BP: (100-115)/(61-66) 103/65  SpO2:  [92 %-94 %] 94 %  Body mass index is 17.11 kg/m². Input and Output Summary (last 24 hours): Intake/Output Summary (Last 24 hours) at 2023 1551  Last data filed at 2023 1430  Gross per 24 hour   Intake 500 ml   Output 400 ml   Net 100 ml       Physical Exam:   Physical Exam  HENT:      Mouth/Throat:      Mouth: Mucous membranes are moist.   Eyes:      Conjunctiva/sclera: Conjunctivae normal.      Pupils: Pupils are equal, round, and reactive to light. Cardiovascular:      Rate and Rhythm: Normal rate and regular rhythm. Pulses: Normal pulses. Heart sounds: No murmur heard. No friction rub. No gallop. Pulmonary:      Breath sounds: Examination of the right-lower field reveals decreased breath sounds. Decreased breath sounds present. No wheezing, rhonchi or rales. Chest:      Chest wall: No tenderness. Abdominal:      General: Bowel sounds are decreased. There is no distension. Palpations: Abdomen is soft. Tenderness: There is abdominal tenderness. There is no right CVA tenderness, left CVA tenderness, guarding or rebound. Musculoskeletal:      Right lower leg: No edema. Left lower leg: No edema. Neurological:      General: No focal deficit present. Mental Status: She is alert and oriented to person, place, and time.    Psychiatric:         Mood and Affect: Mood normal.         Behavior: Behavior normal.         Thought Content:  Thought content normal.          Additional Data:     Labs:  Results from last 7 days   Lab Units 11/22/23  0501   WBC Thousand/uL 10.12   HEMOGLOBIN g/dL 11.4*   HEMATOCRIT % 37.6   PLATELETS Thousands/uL 213   NEUTROS PCT % 73   LYMPHS PCT % 15   MONOS PCT % 7   EOS PCT % 4     Results from last 7 days   Lab Units 11/22/23  0501 11/21/23  0452   SODIUM mmol/L 143 143   POTASSIUM mmol/L 4.6 4.9   CHLORIDE mmol/L 106 109*   CO2 mmol/L 32 30   BUN mg/dL 13 13   CREATININE mg/dL 0.62 0.59*   ANION GAP mmol/L 5 4   CALCIUM mg/dL 9.3 9.0   ALBUMIN g/dL  --  3.1*   TOTAL BILIRUBIN mg/dL  --  0.24   ALK PHOS U/L  --  72   ALT U/L  --  6*   AST U/L  --  14   GLUCOSE RANDOM mg/dL 98 107     Results from last 7 days   Lab Units 11/18/23  0514   INR  1.16     Results from last 7 days   Lab Units 11/18/23  1723   POC GLUCOSE mg/dl 159*         Results from last 7 days   Lab Units 11/18/23  1853 11/17/23  1517   LACTIC ACID mmol/L  --  1.0   PROCALCITONIN ng/ml 0.07  --        Lines/Drains:  Invasive Devices       Peripheral Intravenous Line  Duration             Long-Dwell Peripheral IV (Midline) 14/77/63 Right Basilic 2 days                          Imaging: Reviewed radiology reports from this admission including: chest xray and abdominal/pelvic CT    Recent Cultures (last 7 days):   Results from last 7 days   Lab Units 11/20/23  0750   GRAM STAIN RESULT  Rare Polys  No bacteria seen   BODY FLUID CULTURE, STERILE  No growth       Last 24 Hours Medication List:   Current Facility-Administered Medications   Medication Dose Route Frequency Provider Last Rate    acetaminophen  650 mg Oral Q6H PRN Joshua Aburto MD      heparin (porcine)  5,000 Units Subcutaneous Q8H [facility] Joshua Aburto MD      nicotine  1 patch Transdermal Daily Joshua Aburto MD      ondansetron  4 mg Intravenous Q6H PRN Joshua Aburto MD      predniSONE  5 mg Oral BID With Meals Joshua Aburto MD      pregabalin  150 mg Oral BID Manav Allen MD          Today, Patient Was Seen By: Bozena Copeland MD    **Please Note: This note may have been constructed using a voice recognition system. **

## 2023-11-22 NOTE — ASSESSMENT & PLAN NOTE
Pt notes 20 year history of sarcoidosis  -evidence of pulmonary changes typical of sarcoidosis can be seen on CT last visit  -has not followed up outpatient with pulmonology  -likely non-contributory to current disease, pulm following ip

## 2023-11-22 NOTE — ASSESSMENT & PLAN NOTE
Patient presented initially with n/v/d and subjective fevers suspicious for possible viral cause - which seems to have since resolved  -patient says generalized abdominal pain has continued, and she has not had a bowel movement or passed flatus in 3 days  -denies sharp abdominal pain, also notes history chronic constipation  -abdomen soft, no guarding or rebound tenderness, no bowel sounds heard  -talked with attending who will follow up    -likely needs follow up with outpatient GI or surgery

## 2023-11-22 NOTE — PLAN OF CARE
Problem: PAIN - ADULT  Goal: Verbalizes/displays adequate comfort level or baseline comfort level  Description: Interventions:  - Encourage patient to monitor pain and request assistance  - Assess pain using appropriate pain scale  - Administer analgesics based on type and severity of pain and evaluate response  - Implement non-pharmacological measures as appropriate and evaluate response  - Consider cultural and social influences on pain and pain management  - Notify physician/advanced practitioner if interventions unsuccessful or patient reports new pain  Outcome: Progressing     Problem: INFECTION - ADULT  Goal: Absence or prevention of progression during hospitalization  Description: INTERVENTIONS:  - Assess and monitor for signs and symptoms of infection  - Monitor lab/diagnostic results  - Monitor all insertion sites, i.e. indwelling lines, tubes, and drains  - Monitor endotracheal if appropriate and nasal secretions for changes in amount and color  - [place] appropriate cooling/warming therapies per order  - Administer medications as ordered  - Instruct and encourage patient and family to use good hand hygiene technique  - Identify and instruct in appropriate isolation precautions for identified infection/condition  Outcome: Progressing  Goal: Absence of fever/infection during neutropenic period  Description: INTERVENTIONS:  - Monitor WBC    Outcome: Progressing     Problem: SAFETY ADULT  Goal: Patient will remain free of falls  Description: INTERVENTIONS:  - Educate patient/family on patient safety including physical limitations  - Instruct patient to call for assistance with activity   - Consult OT/PT to assist with strengthening/mobility   - Keep Call bell within reach  - Keep bed low and locked with side rails adjusted as appropriate  - Keep care items and personal belongings within reach  - Initiate and maintain comfort rounds  - Make Fall Risk Sign visible to staff  - Apply yellow socks and bracelet for high fall risk patients  - Consider moving patient to room near nurses station  Outcome: Progressing  Goal: Maintain or return to baseline ADL function  Description: INTERVENTIONS:  -  Assess patient's ability to carry out ADLs; assess patient's baseline for ADL function and identify physical deficits which impact ability to perform ADLs (bathing, care of mouth/teeth, toileting, grooming, dressing, etc.)  - Assess/evaluate cause of self-care deficits   - Assess range of motion  - Assess patient's mobility; develop plan if impaired  - Assess patient's need for assistive devices and provide as appropriate  - Encourage maximum independence but intervene and supervise when necessary  - Involve family in performance of ADLs  - Assess for home care needs following discharge   - Consider OT consult to assist with ADL evaluation and planning for discharge  - Provide patient education as appropriate  Outcome: Progressing  Goal: Maintains/Returns to pre admission functional level  Description: INTERVENTIONS:  - Perform AM-PAC 6 Click Basic Mobility/ Daily Activity assessment daily.  - Set and communicate daily mobility goal to care team and patient/family/caregiver. - Collaborate with rehabilitation services on mobility goals if consulted  - Perform Range of Motion 4 times a day. - Reposition patient every 2 hours.   - Dangle patient 3 times a day  - Stand patient 3 times a day  - Ambulate patient 3 times a day  - Out of bed to chair 3 times a day   - Out of bed for meals 3 times a day  - Out of bed for toileting  - Record patient progress and toleration of activity level   Outcome: Progressing     Problem: DISCHARGE PLANNING  Goal: Discharge to home or other facility with appropriate resources  Description: INTERVENTIONS:  - Identify barriers to discharge w/patient and caregiver  - Arrange for needed discharge resources and transportation as appropriate  - Identify discharge learning needs (meds, wound care, etc.)  - Arrange for interpretive services to assist at discharge as needed  - Refer to Case Management Department for coordinating discharge planning if the patient needs post-hospital services based on physician/advanced practitioner order or complex needs related to functional status, cognitive ability, or social support system  Outcome: Progressing     Problem: Knowledge Deficit  Goal: Patient/family/caregiver demonstrates understanding of disease process, treatment plan, medications, and discharge instructions  Description: Complete learning assessment and assess knowledge base. Interventions:  - Provide teaching at level of understanding  - Provide teaching via preferred learning methods  Outcome: Progressing     Problem: Nutrition/Hydration-ADULT  Goal: Nutrient/Hydration intake appropriate for improving, restoring or maintaining nutritional needs  Description: Monitor and assess patient's nutrition/hydration status for malnutrition. Collaborate with interdisciplinary team and initiate plan and interventions as ordered. Monitor patient's weight and dietary intake as ordered or per policy. Utilize nutrition screening tool and intervene as necessary. Determine patient's food preferences and provide high-protein, high-caloric foods as appropriate.      INTERVENTIONS:  - Monitor oral intake, urinary output, labs, and treatment plans  - Assess nutrition and hydration status and recommend course of action  - Evaluate amount of meals eaten  - Assist patient with eating if necessary   - Allow adequate time for meals  - Recommend/ encourage appropriate diets, oral nutritional supplements, and vitamin/mineral supplements  - Order, calculate, and assess calorie counts as needed  - Recommend, monitor, and adjust tube feedings and TPN/PPN based on assessed needs  - Assess need for intravenous fluids  - Provide specific nutrition/hydration education as appropriate  - Include patient/family/caregiver in decisions related to nutrition  Outcome: Progressing     Problem: Prexisting or High Potential for Compromised Skin Integrity  Goal: Skin integrity is maintained or improved  Description: INTERVENTIONS:  - Identify patients at risk for skin breakdown  - Assess and monitor skin integrity  - Assess and monitor nutrition and hydration status  - Monitor labs   - Assess for incontinence   - Turn and reposition patient  - Assist with mobility/ambulation  - Relieve pressure over bony prominences  - Avoid friction and shearing  - Provide appropriate hygiene as needed including keeping skin clean and dry  - Evaluate need for skin moisturizer/barrier cream  - Collaborate with interdisciplinary team   - Patient/family teaching  - Consider wound care consult   Outcome: Progressing

## 2023-11-23 ENCOUNTER — APPOINTMENT (INPATIENT)
Dept: RADIOLOGY | Facility: HOSPITAL | Age: 68
DRG: 546 | End: 2023-11-23
Payer: COMMERCIAL

## 2023-11-23 LAB
BACTERIA SPEC BFLD CULT: NO GROWTH
BASOPHILS # BLD AUTO: 0.02 THOUSANDS/ÂΜL (ref 0–0.1)
BASOPHILS NFR BLD AUTO: 0 % (ref 0–1)
EOSINOPHIL # BLD AUTO: 0.12 THOUSAND/ÂΜL (ref 0–0.61)
EOSINOPHIL NFR BLD AUTO: 1 % (ref 0–6)
ERYTHROCYTE [DISTWIDTH] IN BLOOD BY AUTOMATED COUNT: 15.2 % (ref 11.6–15.1)
GRAM STN SPEC: NORMAL
GRAM STN SPEC: NORMAL
HCT VFR BLD AUTO: 36.3 % (ref 34.8–46.1)
HGB BLD-MCNC: 11.6 G/DL (ref 11.5–15.4)
IMM GRANULOCYTES # BLD AUTO: 0.05 THOUSAND/UL (ref 0–0.2)
IMM GRANULOCYTES NFR BLD AUTO: 0 % (ref 0–2)
LYMPHOCYTES # BLD AUTO: 0.68 THOUSANDS/ÂΜL (ref 0.6–4.47)
LYMPHOCYTES NFR BLD AUTO: 5 % (ref 14–44)
MCH RBC QN AUTO: 30.6 PG (ref 26.8–34.3)
MCHC RBC AUTO-ENTMCNC: 32 G/DL (ref 31.4–37.4)
MCV RBC AUTO: 96 FL (ref 82–98)
MONOCYTES # BLD AUTO: 0.74 THOUSAND/ÂΜL (ref 0.17–1.22)
MONOCYTES NFR BLD AUTO: 6 % (ref 4–12)
NEUTROPHILS # BLD AUTO: 10.97 THOUSANDS/ÂΜL (ref 1.85–7.62)
NEUTS SEG NFR BLD AUTO: 88 % (ref 43–75)
NRBC BLD AUTO-RTO: 0 /100 WBCS
PLATELET # BLD AUTO: 270 THOUSANDS/UL (ref 149–390)
PMV BLD AUTO: 10.9 FL (ref 8.9–12.7)
RBC # BLD AUTO: 3.79 MILLION/UL (ref 3.81–5.12)
WBC # BLD AUTO: 12.58 THOUSAND/UL (ref 4.31–10.16)

## 2023-11-23 PROCEDURE — 99232 SBSQ HOSP IP/OBS MODERATE 35: CPT | Performed by: INTERNAL MEDICINE

## 2023-11-23 PROCEDURE — 85025 COMPLETE CBC W/AUTO DIFF WBC: CPT | Performed by: INTERNAL MEDICINE

## 2023-11-23 PROCEDURE — 71045 X-RAY EXAM CHEST 1 VIEW: CPT

## 2023-11-23 RX ADMIN — HEPARIN SODIUM 5000 UNITS: 5000 INJECTION INTRAVENOUS; SUBCUTANEOUS at 06:39

## 2023-11-23 RX ADMIN — PREGABALIN 150 MG: 75 CAPSULE ORAL at 09:23

## 2023-11-23 RX ADMIN — KETOROLAC TROMETHAMINE 15 MG: 30 INJECTION, SOLUTION INTRAMUSCULAR; INTRAVENOUS at 06:39

## 2023-11-23 RX ADMIN — HEPARIN SODIUM 5000 UNITS: 5000 INJECTION INTRAVENOUS; SUBCUTANEOUS at 21:29

## 2023-11-23 RX ADMIN — PREDNISONE 5 MG: 5 TABLET ORAL at 09:23

## 2023-11-23 RX ADMIN — PREDNISONE 5 MG: 5 TABLET ORAL at 17:26

## 2023-11-23 RX ADMIN — PREGABALIN 150 MG: 75 CAPSULE ORAL at 17:26

## 2023-11-23 RX ADMIN — KETOROLAC TROMETHAMINE 15 MG: 30 INJECTION, SOLUTION INTRAMUSCULAR; INTRAVENOUS at 14:17

## 2023-11-23 RX ADMIN — HEPARIN SODIUM 5000 UNITS: 5000 INJECTION INTRAVENOUS; SUBCUTANEOUS at 14:18

## 2023-11-23 NOTE — PROGRESS NOTES
[address]  Progress Note  Name: Yajaira Thomas  MRN: [id number]  Unit/Bed#: -01 I Date of Admission: 11/17/2023   Date of Service: 11/23/2023 I Hospital Day: 5    Assessment/Plan   Sarcoidosis  Assessment & Plan  Does not appear to be active, hold off on any steroids right now    Moderate protein-calorie malnutrition (HCC)  Assessment & Plan  Malnutrition Findings:                                 BMI Findings:  Adult BMI Classifications: Underweight < 18.5        Body mass index is 17.11 kg/m². Advised high-protein diet    Rheumatoid arthritis (HCC)  Assessment & Plan  Continue prednisone and Lyrica    * Pleural effusion  Assessment & Plan  Incidental finding, asymptomatic without oxygen requirements  Unclear etiology, refer to CT imaging report  Low suspicion for acute bacterial pneumonia given absence of fevers, leukocytosis, and oxygen requirements  Pulmonology and IR consulted for further management  Status post thoracentesis with 400 cc fluid removed  Repeat thoracentesis with possible chest tube placement on 11/24/2023  Given loculated nature of the effusion, may need thoracentesis again with chest tube placement. Input from pulmonary and interventional radiology teams personally appreciated. TE Pharmacologic Prophylaxis: Heparin    Patient Centered Rounds: I have performed bedside rounds with nursing staff today. Discussions with Specialists or Other Care Team Provider: Patient care team  Education and Discussions with Family / Patient: Patient    Current Length of Stay: 5 day(s)  Current Patient Status: Inpatient     Certification Statement: The patient will continue to require additional inpatient hospital stay due to Pleural effusion  Discharge Plan / Estimated Discharge Date: 1 to 2 days    Code Status: Level 1 - Full Code  ______________________________________________________________________________    Subjective:   Patient seen and examined by me.   No chest pain, palpitations or diaphoresis. No fever or chills at this point of time. Patient does have weakness and the weakness is generalized. Feels like she wants to smoke but is upset when I told her that she cannot smoke    Objective:   Vitals: Blood pressure 96/59, pulse 100, temperature (!) 97.4 °F (36.3 °C), resp. rate 18, height 5' 3" (1.6 m), weight 43.8 kg (96 lb 9 oz), SpO2 (!) 87 %.     Physical Exam:   General appearance: alert, fatigued, appears stated age, and cooperative  Constitutional- looks a little weak  HEENT - atraumatic and normocephalic  Neck- supple  Skin - no fresh rash  Extremities no fresh focal deformities  Cardiovascular- S1-S2 heard  Respiratory- bilateral air entry present, no crackles or rhonchi  Skin - no fresh rash  Abdomen - normal bowel sounds present, no rebound tenderness  CNS- No fresh focal deficits  Psych- no acute psychosis     Additional Data:   Labs:  Results from last 7 days   Lab Units 11/22/23  0501 11/20/23  0453 11/19/23  0936 11/18/23  0514 11/17/23  1517   WBC Thousand/uL 10.12 5.63 5.65 5.30 8.02   HEMOGLOBIN g/dL 11.4* 11.2* 11.3* 11.6 13.1   HEMATOCRIT % 37.6 36.4 36.6 37.2 41.5   MCV fL 97 98 98 96 95   PLATELETS Thousands/uL 213 257 260 275 333   INR   --   --   --  1.16  --      Results from last 7 days   Lab Units 11/22/23  0501 11/21/23  0452 11/20/23  0453 11/19/23  0936 11/18/23  0514 11/17/23  1517   SODIUM mmol/L 143 143 145 141 140 139   POTASSIUM mmol/L 4.6 4.9 4.3 4.6 4.2 4.7   CHLORIDE mmol/L 106 109* 110* 110* 106 104   CO2 mmol/L 32 30 30 24 27 27   ANION GAP mmol/L 5 4 5 7 7 8   BUN mg/dL 13 13 11 12 12 18   CREATININE mg/dL 0.62 0.59* 0.60 0.61 0.63 0.59*   CALCIUM mg/dL 9.3 9.0 9.0 8.9 9.1 9.4   ALBUMIN g/dL  --  3.1* 3.1* 3.4* 2.9* 3.3*   TOTAL BILIRUBIN mg/dL  --  0.24 0.22 0.25 0.47 0.60   ALK PHOS U/L  --  72 90 92 81 102   ALT U/L  --  6* 6* 5* 6* 9   AST U/L  --  14 10* 12* 12* 23   EGFR ml/min/1.73sq m 92 94 93 93 92 94   GLUCOSE RANDOM mg/dL 98 107 99 145* 84 116     Results from last 7 days   Lab Units 11/21/23  0452 11/20/23  0453 11/19/23  0936   MAGNESIUM mg/dL 2.1 2.0 1.9              Results from last 7 days   Lab Units 11/18/23  1853 11/17/23  1517   LACTIC ACID mmol/L  --  1.0   PROCALCITONIN ng/ml 0.07  --      Results from last 7 days   Lab Units 11/18/23  1723   POC GLUCOSE mg/dl 159*             * I Have Reviewed All Lab Data Listed Above. Cultures:   Results from last 7 days   Lab Units 11/20/23  0750 11/17/23  1449   GRAM STAIN RESULT  Rare Polys  No bacteria seen  --    BODY FLUID CULTURE, STERILE  No growth  --    INFLUENZA A PCR   --  Negative     Results from last 7 days   Lab Units 11/17/23  1449   INFLUENZA A PCR  Negative   INFLUENZA B PCR  Negative   RSV PCR  Negative         Imaging:  Imaging Reports Reviewed Today Include:   XR chest portable    Result Date: 11/22/2023  Impression: No significant change in size or appearance of right pleural effusion from prior exam Workstation performed: [id number]     XR chest portable    Result Date: 11/21/2023  Impression: Small right pleural effusion and right basilar linear atelectasis. Workstation performed: [id number]     CT chest wo contrast    Result Date: 11/21/2023  Impression: Small loculated right pleural effusion, slightly decreased from 11/17/2023. Trace left pleural effusion, slightly increased from 11/17/2023. Mild dependent tree-in-bud nodularity in both upper lobes, bronchiolitis versus aspiration. Improved atelectasis in the right lung. Redemonstration of 1.4 cm low-attenuation right lower lobe nodule. As previously recommended, follow-up can be obtained with a noncontrasted abdominal CT or MRI in 12 months with biochemical evaluation to rule out functioning adenoma if not already performed.  Workstation performed: [id number]     IR IN-Patient Thoracentesis    Result Date: 11/20/2023  Impression: Ultrasound-guided right thoracentesis with drainage of 400 mL of cloudy yellow pleural fluid. Plan: Resume care by clinical team. Workstation performed: [id number]     CT chest abdomen pelvis w contrast    Result Date: 11/17/2023  Impression: Increasing loculated effusion in the right mid and lower chest. Atelectasis in the right chest has progressed to an even greater degree than pleural effusions and there is now slight rightward shift of the heart and mediastinal structures. Otherwise no significant interval change. Osteopenia with numerous healed chronic fractures reidentified. Reidentified circumscribed 14 mm right adrenal nodule. Although its imaging features are indeterminate, it does not have suspicious imaging features (heterogeneity, necrosis, irregular margins), therefore this is likely benign, and can be followed by non-contrast abdomen CT or MRI in 12 months. Please note that for adrenal nodule > 1cm,  biochemical evaluation is suggested to rule out functioning adenoma, if not already performed. Adrenal recommendation based on institutional consensus and Journal of [employer] 2622;25:8410-6761 Workstation performed: [id number]     CT head without contrast    Result Date: 11/17/2023  Impression: No acute intracranial abnormality. Workstation performed: [id number]     Scheduled Meds:  Current Facility-Administered Medications   Medication Dose Route Frequency Provider Last Rate    acetaminophen  650 mg Oral Q6H PRN Joshua Aburto MD      heparin (porcine)  5,000 Units Subcutaneous Q8H Hubert Ortega MD      nicotine  1 patch Transdermal Daily Joshua Aburto MD      ondansetron  4 mg Intravenous Q6H PRN Joshua Aburto MD      predniSONE  5 mg Oral BID With Meals Anamaria Angulo MD      pregabalin  150 mg Oral BID MD Adenike Hood MD  [facility]    ** Please Note: This note has been constructed using a voice recognition system.  **

## 2023-11-23 NOTE — ASSESSMENT & PLAN NOTE
Incidental finding, asymptomatic without oxygen requirements  Unclear etiology, refer to CT imaging report  Low suspicion for acute bacterial pneumonia given absence of fevers, leukocytosis, and oxygen requirements  Pulmonology and IR consulted for further management  Status post thoracentesis with 400 cc fluid removed  Repeat thoracentesis with possible chest tube placement on 11/24/2023  Given loculated nature of the effusion, may need thoracentesis again with chest tube placement. Input from pulmonary and interventional radiology teams personally appreciated.

## 2023-11-23 NOTE — PLAN OF CARE
Problem: PAIN - ADULT  Goal: Verbalizes/displays adequate comfort level or baseline comfort level  Description: Interventions:  - Encourage patient to monitor pain and request assistance  - Assess pain using appropriate pain scale  - Administer analgesics based on type and severity of pain and evaluate response  - Implement non-pharmacological measures as appropriate and evaluate response  - Consider cultural and social influences on pain and pain management  - Notify physician/advanced practitioner if interventions unsuccessful or patient reports new pain  Outcome: Progressing     Problem: INFECTION - ADULT  Goal: Absence or prevention of progression during hospitalization  Description: INTERVENTIONS:  - Assess and monitor for signs and symptoms of infection  - Monitor lab/diagnostic results  - Monitor all insertion sites, i.e. indwelling lines, tubes, and drains  - Monitor endotracheal if appropriate and nasal secretions for changes in amount and color  - [place] appropriate cooling/warming therapies per order  - Administer medications as ordered  - Instruct and encourage patient and family to use good hand hygiene technique  - Identify and instruct in appropriate isolation precautions for identified infection/condition  Outcome: Progressing  Goal: Absence of fever/infection during neutropenic period  Description: INTERVENTIONS:  - Monitor WBC    Outcome: Progressing     Problem: SAFETY ADULT  Goal: Patient will remain free of falls  Description: INTERVENTIONS:  - Educate patient/family on patient safety including physical limitations  - Instruct patient to call for assistance with activity   - Consult OT/PT to assist with strengthening/mobility   - Keep Call bell within reach  - Keep bed low and locked with side rails adjusted as appropriate  - Keep care items and personal belongings within reach  - Initiate and maintain comfort rounds  - Make Fall Risk Sign visible to staff  - Apply yellow socks and bracelet for high fall risk patients  - Consider moving patient to room near nurses station  Outcome: Progressing  Goal: Maintain or return to baseline ADL function  Description: INTERVENTIONS:  -  Assess patient's ability to carry out ADLs; assess patient's baseline for ADL function and identify physical deficits which impact ability to perform ADLs (bathing, care of mouth/teeth, toileting, grooming, dressing, etc.)  - Assess/evaluate cause of self-care deficits   - Assess range of motion  - Assess patient's mobility; develop plan if impaired  - Assess patient's need for assistive devices and provide as appropriate  - Encourage maximum independence but intervene and supervise when necessary  - Involve family in performance of ADLs  - Assess for home care needs following discharge   - Consider OT consult to assist with ADL evaluation and planning for discharge  - Provide patient education as appropriate  Outcome: Progressing  Goal: Maintains/Returns to pre admission functional level  Description: INTERVENTIONS:  - Perform AM-PAC 6 Click Basic Mobility/ Daily Activity assessment daily.  - Set and communicate daily mobility goal to care team and patient/family/caregiver. - Collaborate with rehabilitation services on mobility goals if consulted  - Perform Range of Motion 4 times a day. - Reposition patient every 2 hours.   - Dangle patient 3 times a day  - Stand patient 3 times a day  - Ambulate patient 3 times a day  - Out of bed to chair 3 times a day   - Out of bed for meals 3 times a day  - Out of bed for toileting  - Record patient progress and toleration of activity level   Outcome: Progressing     Problem: DISCHARGE PLANNING  Goal: Discharge to home or other facility with appropriate resources  Description: INTERVENTIONS:  - Identify barriers to discharge w/patient and caregiver  - Arrange for needed discharge resources and transportation as appropriate  - Identify discharge learning needs (meds, wound care, etc.)  - Arrange for interpretive services to assist at discharge as needed  - Refer to Case Management Department for coordinating discharge planning if the patient needs post-hospital services based on physician/advanced practitioner order or complex needs related to functional status, cognitive ability, or social support system  Outcome: Progressing     Problem: Knowledge Deficit  Goal: Patient/family/caregiver demonstrates understanding of disease process, treatment plan, medications, and discharge instructions  Description: Complete learning assessment and assess knowledge base. Interventions:  - Provide teaching at level of understanding  - Provide teaching via preferred learning methods  Outcome: Progressing     Problem: Nutrition/Hydration-ADULT  Goal: Nutrient/Hydration intake appropriate for improving, restoring or maintaining nutritional needs  Description: Monitor and assess patient's nutrition/hydration status for malnutrition. Collaborate with interdisciplinary team and initiate plan and interventions as ordered. Monitor patient's weight and dietary intake as ordered or per policy. Utilize nutrition screening tool and intervene as necessary. Determine patient's food preferences and provide high-protein, high-caloric foods as appropriate.      INTERVENTIONS:  - Monitor oral intake, urinary output, labs, and treatment plans  - Assess nutrition and hydration status and recommend course of action  - Evaluate amount of meals eaten  - Assist patient with eating if necessary   - Allow adequate time for meals  - Recommend/ encourage appropriate diets, oral nutritional supplements, and vitamin/mineral supplements  - Order, calculate, and assess calorie counts as needed  - Recommend, monitor, and adjust tube feedings and TPN/PPN based on assessed needs  - Assess need for intravenous fluids  - Provide specific nutrition/hydration education as appropriate  - Include patient/family/caregiver in decisions related to nutrition  Outcome: Progressing     Problem: Prexisting or High Potential for Compromised Skin Integrity  Goal: Skin integrity is maintained or improved  Description: INTERVENTIONS:  - Identify patients at risk for skin breakdown  - Assess and monitor skin integrity  - Assess and monitor nutrition and hydration status  - Monitor labs   - Assess for incontinence   - Turn and reposition patient  - Assist with mobility/ambulation  - Relieve pressure over bony prominences  - Avoid friction and shearing  - Provide appropriate hygiene as needed including keeping skin clean and dry  - Evaluate need for skin moisturizer/barrier cream  - Collaborate with interdisciplinary team   - Patient/family teaching  - Consider wound care consult   Outcome: Progressing

## 2023-11-23 NOTE — ASSESSMENT & PLAN NOTE
Malnutrition Findings:                                 BMI Findings:  Adult BMI Classifications: Underweight < 18.5        Body mass index is 17.11 kg/m².    Advised high-protein diet

## 2023-11-24 ENCOUNTER — APPOINTMENT (OUTPATIENT)
Dept: NON INVASIVE DIAGNOSTICS | Facility: HOSPITAL | Age: 68
DRG: 546 | End: 2023-11-24
Attending: RADIOLOGY
Payer: COMMERCIAL

## 2023-11-24 LAB
ANION GAP SERPL CALCULATED.3IONS-SCNC: 6 MMOL/L
BASOPHILS # BLD AUTO: 0.02 THOUSANDS/ÂΜL (ref 0–0.1)
BASOPHILS NFR BLD AUTO: 0 % (ref 0–1)
BUN SERPL-MCNC: 23 MG/DL (ref 5–25)
CALCIUM SERPL-MCNC: 9.2 MG/DL (ref 8.4–10.2)
CHLORIDE SERPL-SCNC: 102 MMOL/L (ref 96–108)
CO2 SERPL-SCNC: 29 MMOL/L (ref 21–32)
CREAT SERPL-MCNC: 0.6 MG/DL (ref 0.6–1.3)
EOSINOPHIL # BLD AUTO: 0.22 THOUSAND/ÂΜL (ref 0–0.61)
EOSINOPHIL NFR BLD AUTO: 2 % (ref 0–6)
ERYTHROCYTE [DISTWIDTH] IN BLOOD BY AUTOMATED COUNT: 15.3 % (ref 11.6–15.1)
GFR SERPL CREATININE-BSD FRML MDRD: 93 ML/MIN/1.73SQ M
GLUCOSE SERPL-MCNC: 115 MG/DL (ref 65–140)
HCT VFR BLD AUTO: 38.1 % (ref 34.8–46.1)
HGB BLD-MCNC: 11.8 G/DL (ref 11.5–15.4)
IMM GRANULOCYTES # BLD AUTO: 0.06 THOUSAND/UL (ref 0–0.2)
IMM GRANULOCYTES NFR BLD AUTO: 0 % (ref 0–2)
LYMPHOCYTES # BLD AUTO: 1.36 THOUSANDS/ÂΜL (ref 0.6–4.47)
LYMPHOCYTES NFR BLD AUTO: 10 % (ref 14–44)
MCH RBC QN AUTO: 29.7 PG (ref 26.8–34.3)
MCHC RBC AUTO-ENTMCNC: 31 G/DL (ref 31.4–37.4)
MCV RBC AUTO: 96 FL (ref 82–98)
MONOCYTES # BLD AUTO: 0.85 THOUSAND/ÂΜL (ref 0.17–1.22)
MONOCYTES NFR BLD AUTO: 6 % (ref 4–12)
NEUTROPHILS # BLD AUTO: 11.08 THOUSANDS/ÂΜL (ref 1.85–7.62)
NEUTS SEG NFR BLD AUTO: 82 % (ref 43–75)
NRBC BLD AUTO-RTO: 0 /100 WBCS
PLATELET # BLD AUTO: 301 THOUSANDS/UL (ref 149–390)
PMV BLD AUTO: 11 FL (ref 8.9–12.7)
POTASSIUM SERPL-SCNC: 4.8 MMOL/L (ref 3.5–5.3)
RBC # BLD AUTO: 3.97 MILLION/UL (ref 3.81–5.12)
SODIUM SERPL-SCNC: 137 MMOL/L (ref 135–147)
WBC # BLD AUTO: 13.59 THOUSAND/UL (ref 4.31–10.16)

## 2023-11-24 PROCEDURE — NC001 PR NO CHARGE: Performed by: RADIOLOGY

## 2023-11-24 PROCEDURE — C1729 CATH, DRAINAGE: HCPCS

## 2023-11-24 PROCEDURE — C1769 GUIDE WIRE: HCPCS

## 2023-11-24 PROCEDURE — 32557 INSERT CATH PLEURA W/ IMAGE: CPT | Performed by: RADIOLOGY

## 2023-11-24 PROCEDURE — 32557 INSERT CATH PLEURA W/ IMAGE: CPT

## 2023-11-24 PROCEDURE — 99232 SBSQ HOSP IP/OBS MODERATE 35: CPT | Performed by: INTERNAL MEDICINE

## 2023-11-24 PROCEDURE — 0W9930Z DRAINAGE OF RIGHT PLEURAL CAVITY WITH DRAINAGE DEVICE, PERCUTANEOUS APPROACH: ICD-10-PCS | Performed by: INTERNAL MEDICINE

## 2023-11-24 PROCEDURE — 80048 BASIC METABOLIC PNL TOTAL CA: CPT | Performed by: INTERNAL MEDICINE

## 2023-11-24 PROCEDURE — 85025 COMPLETE CBC W/AUTO DIFF WBC: CPT | Performed by: INTERNAL MEDICINE

## 2023-11-24 PROCEDURE — A7041 WATER SEAL DRAIN CONTAINER: HCPCS

## 2023-11-24 RX ORDER — LIDOCAINE HYDROCHLORIDE 10 MG/ML
INJECTION, SOLUTION EPIDURAL; INFILTRATION; INTRACAUDAL; PERINEURAL AS NEEDED
Status: COMPLETED | OUTPATIENT
Start: 2023-11-24 | End: 2023-11-24

## 2023-11-24 RX ADMIN — PREGABALIN 150 MG: 75 CAPSULE ORAL at 17:15

## 2023-11-24 RX ADMIN — HEPARIN SODIUM 5000 UNITS: 5000 INJECTION INTRAVENOUS; SUBCUTANEOUS at 05:32

## 2023-11-24 RX ADMIN — PREGABALIN 150 MG: 75 CAPSULE ORAL at 08:05

## 2023-11-24 RX ADMIN — PREDNISONE 5 MG: 5 TABLET ORAL at 17:15

## 2023-11-24 RX ADMIN — ACETAMINOPHEN 650 MG: 325 TABLET, FILM COATED ORAL at 21:02

## 2023-11-24 RX ADMIN — HEPARIN SODIUM 5000 UNITS: 5000 INJECTION INTRAVENOUS; SUBCUTANEOUS at 13:35

## 2023-11-24 RX ADMIN — PREDNISONE 5 MG: 5 TABLET ORAL at 08:05

## 2023-11-24 RX ADMIN — Medication 2.5 MG: at 23:00

## 2023-11-24 RX ADMIN — LIDOCAINE HYDROCHLORIDE 10 ML: 10 INJECTION, SOLUTION EPIDURAL; INFILTRATION; INTRACAUDAL; PERINEURAL at 10:16

## 2023-11-24 RX ADMIN — HEPARIN SODIUM 5000 UNITS: 5000 INJECTION INTRAVENOUS; SUBCUTANEOUS at 21:02

## 2023-11-24 NOTE — PROGRESS NOTES
[address]  Progress Note  Name: Lakeshia Montalvo  MRN: [id number]  Unit/Bed#: -01 I Date of Admission: 11/17/2023   Date of Service: 11/24/2023 I Hospital Day: 6    Assessment/Plan   * Pleural effusion  Assessment & Plan  Incidental finding, asymptomatic without oxygen requirements  Unclear etiology, refer to CT imaging report  Low suspicion for acute bacterial pneumonia given absence of fevers, leukocytosis, and oxygen requirements  Pulmonology and IR consulted for further management  Status post thoracentesis with 400 cc fluid removed  Repeat chest x-ray shows worsening of the pleural effusion  Repeat thoracentesis with possible chest tube placement on 11/24/2023  Given loculated nature of the effusion, may need thoracentesis again with chest tube placement. Input from pulmonary and interventional radiology teams personally appreciated. Sarcoidosis  Assessment & Plan  Does not appear to be active, hold off on any steroids right now. Moderate protein-calorie malnutrition ([address])  Assessment & Plan  Malnutrition Findings:                                 BMI Findings:  Adult BMI Classifications: Underweight < 18.5        Body mass index is 17.11 kg/m². Advised high-protein diet. Rheumatoid arthritis (HCC)  Assessment & Plan  Continue prednisone and Lyrica. TE Pharmacologic Prophylaxis: Heparin    Patient Centered Rounds: I have performed bedside rounds with nursing staff today. Discussions with Specialists or Other Care Team Provider: Pulmonology  Education and Discussions with Family / Patient: Patient    Current Length of Stay: 6 day(s)  Current Patient Status: Inpatient     Certification Statement: The patient will continue to require additional inpatient hospital stay due to Pleural effusion  Discharge Plan / Estimated Discharge Date:  At least 1-2 more days    Code Status: Level 1 - Full Code  ______________________________________________________________________________    Subjective:   Patient seen and examined by me. No chest pain, palpitations or diaphoresis at this point of time. Patient does have weakness and the weakness is generalized. No nausea or vomiting. Shortness of breath seems stable. No fever    Objective:   Vitals: Blood pressure 92/60, pulse 98, temperature 97.5 °F (36.4 °C), resp. rate 18, height 5' 3" (1.6 m), weight 43.8 kg (96 lb 9 oz), SpO2 92 %.     Physical Exam:   General appearance: alert, fatigued, appears stated age, and cooperative  Constitutional- looks a little weak  HEENT - atraumatic and normocephalic  Neck- supple  Skin - no fresh rash  Extremities no fresh focal deformities  Cardiovascular- S1-S2 heard  Respiratory- bilateral air entry present, no crackles or rhonchi, decreased breath sounds at the bases  Skin - no fresh rash  Abdomen - normal bowel sounds present, no rebound tenderness  CNS- No fresh focal deficits  Psych- no acute psychosis     Additional Data:   Labs:  Results from last 7 days   Lab Units 11/24/23  0530 11/23/23  1524 11/22/23  0501 11/20/23  0453 11/19/23  0936 11/18/23  0514 11/17/23  1517   WBC Thousand/uL 13.59* 12.58* 10.12 5.63 5.65 5.30 8.02   HEMOGLOBIN g/dL 11.8 11.6 11.4* 11.2* 11.3* 11.6 13.1   HEMATOCRIT % 38.1 36.3 37.6 36.4 36.6 37.2 41.5   MCV fL 96 96 97 98 98 96 95   PLATELETS Thousands/uL 301 270 213 257 260 275 333   INR   --   --   --   --   --  1.16  --      Results from last 7 days   Lab Units 11/24/23  0530 11/22/23  0501 11/21/23  0452 11/20/23  0453 11/19/23  0936 11/18/23  0514 11/17/23  1517   SODIUM mmol/L 137 143 143 145 141 140 139   POTASSIUM mmol/L 4.8 4.6 4.9 4.3 4.6 4.2 4.7   CHLORIDE mmol/L 102 106 109* 110* 110* 106 104   CO2 mmol/L 29 32 30 30 24 27 27   ANION GAP mmol/L 6 5 4 5 7 7 8   BUN mg/dL 23 13 13 11 12 12 18   CREATININE mg/dL 0.60 0.62 0.59* 0.60 0.61 0.63 0.59*   CALCIUM mg/dL 9.2 9.3 9.0 9.0 8.9 9.1 9.4   ALBUMIN g/dL  --   --  3.1* 3.1* 3.4* 2.9* 3.3*   TOTAL BILIRUBIN mg/dL  --   --  0.24 0.22 0.25 0.47 0.60   ALK PHOS U/L  --   --  72 90 92 81 102   ALT U/L  --   --  6* 6* 5* 6* 9   AST U/L  --   --  14 10* 12* 12* 23   EGFR ml/min/1.73sq m 93 92 94 93 93 92 94   GLUCOSE RANDOM mg/dL 115 98 107 99 145* 84 116     Results from last 7 days   Lab Units 11/21/23  0452 11/20/23  0453 11/19/23  0936   MAGNESIUM mg/dL 2.1 2.0 1.9              Results from last 7 days   Lab Units 11/18/23  1853 11/17/23  1517   LACTIC ACID mmol/L  --  1.0   PROCALCITONIN ng/ml 0.07  --      Results from last 7 days   Lab Units 11/18/23  1723   POC GLUCOSE mg/dl 159*             * I Have Reviewed All Lab Data Listed Above. Cultures:   Results from last 7 days   Lab Units 11/20/23  0750 11/17/23  1449   GRAM STAIN RESULT  Rare Polys  No bacteria seen  --    BODY FLUID CULTURE, STERILE  No growth  --    INFLUENZA A PCR   --  Negative     Results from last 7 days   Lab Units 11/17/23  1449   INFLUENZA A PCR  Negative   INFLUENZA B PCR  Negative   RSV PCR  Negative         Imaging:  Imaging Reports Reviewed Today Include:   XR chest portable    Result Date: 11/22/2023  Impression: No significant change in size or appearance of right pleural effusion from prior exam Workstation performed: [id number]     XR chest portable    Result Date: 11/21/2023  Impression: Small right pleural effusion and right basilar linear atelectasis. Workstation performed: [id number]     CT chest wo contrast    Result Date: 11/21/2023  Impression: Small loculated right pleural effusion, slightly decreased from 11/17/2023. Trace left pleural effusion, slightly increased from 11/17/2023. Mild dependent tree-in-bud nodularity in both upper lobes, bronchiolitis versus aspiration. Improved atelectasis in the right lung. Redemonstration of 1.4 cm low-attenuation right lower lobe nodule.  As previously recommended, follow-up can be obtained with a noncontrasted abdominal CT or MRI in 12 months with biochemical evaluation to rule out functioning adenoma if not already performed. Workstation performed: [id number]     IR IN-Patient Thoracentesis    Result Date: 11/20/2023  Impression: Ultrasound-guided right thoracentesis with drainage of 400 mL of cloudy yellow pleural fluid. Plan: Resume care by clinical team. Workstation performed: [id number]     CT chest abdomen pelvis w contrast    Result Date: 11/17/2023  Impression: Increasing loculated effusion in the right mid and lower chest. Atelectasis in the right chest has progressed to an even greater degree than pleural effusions and there is now slight rightward shift of the heart and mediastinal structures. Otherwise no significant interval change. Osteopenia with numerous healed chronic fractures reidentified. Reidentified circumscribed 14 mm right adrenal nodule. Although its imaging features are indeterminate, it does not have suspicious imaging features (heterogeneity, necrosis, irregular margins), therefore this is likely benign, and can be followed by non-contrast abdomen CT or MRI in 12 months. Please note that for adrenal nodule > 1cm,  biochemical evaluation is suggested to rule out functioning adenoma, if not already performed. Adrenal recommendation based on institutional consensus and [employer] of [employer] 1518;29:2062-9448 Workstation performed: [id number]     CT head without contrast    Result Date: 11/17/2023  Impression: No acute intracranial abnormality.  Workstation performed: [id number]     Scheduled Meds:  Current Facility-Administered Medications   Medication Dose Route Frequency Provider Last Rate    acetaminophen  650 mg Oral Q6H PRN Joshua Aburto MD      heparin (porcine)  5,000 Units Subcutaneous Q8H [facility] Joshua Aburto MD      nicotine  1 patch Transdermal Daily Joshua Aburto MD      ondansetron  4 mg Intravenous Q6H PRN Joshua Aburto MD      predniSONE  5 mg Oral BID With Meals Kayla Singh MD      pregabalin  150 mg Oral BID MD Raysa Richard MD  [facility]    ** Please Note: This note has been constructed using a voice recognition system.  **

## 2023-11-24 NOTE — ASSESSMENT & PLAN NOTE
Malnutrition Findings:                                 BMI Findings:  Adult BMI Classifications: Underweight < 18.5        Body mass index is 17.11 kg/m². Advised high-protein diet.

## 2023-11-24 NOTE — CASE MANAGEMENT
Case Management Discharge Planning Note    Patient name Britton Billy  Location [address] [facility] [place] 303/-16 MRN [id number]  : 1955 Date 2023       Current Admission Date: 2023  Current Admission Diagnosis:Pleural effusion   Patient Active Problem List    Diagnosis Date Noted    Sarcoidosis 2023    Pleural effusion 2023    Moderate protein-calorie malnutrition ([address]) 2023    Abnormal CT scan 2023    Adrenal nodule ([address]) 2023    Respiratory insufficiency 2023    Rheumatoid arthritis ([address]) 2023      LOS (days): 6  Geometric Mean LOS (GMLOS) (days): 3.10  Days to GMLOS:-3.2     OBJECTIVE:  Risk of Unplanned Readmission Score: 12.51         Current admission status: Inpatient   Preferred Pharmacy:   [address] [place] [address]  Phone: [phone] Fax: [phone]    Primary Care Provider: No primary care provider on file. Primary Insurance: [employer] [facility] REP  Secondary Insurance:     DISCHARGE DETAILS:                                          Other Referral/Resources/Interventions Provided:  Interventions: SNF, Short Term Rehab  Referral Comments: [facility]/ LTC SNF referral sent in Aidin to [facility], [address], [facility]. Manually faxed referrals to [facility] at [facility].             Discharge Destination Plan[de-identified] SNF, Short Term Rehab  Transport at Discharge : [facility] Roni Figueredo

## 2023-11-24 NOTE — CASE MANAGEMENT
Case Management Progress Note    Patient name Niki Agustin  Location /-01 MRN [id number]  : 1955 Date 2023       LOS (days): 6  Geometric Mean LOS (GMLOS) (days): 3.10  Days to GMLOS:-3.2        OBJECTIVE:        Current admission status: Inpatient  Preferred Pharmacy:   [facility] #[id number] - [address] [facility], [address]  Phone: [phone] Fax: [phone]    Primary Care Provider: No primary care provider on file. Primary Insurance: [employer] [facility] REP  Secondary Insurance:     PROGRESS NOTE:  Spoke with Admissions Director Cata at [facility]. She states they are not in-network with [facility] for skilled nursing, but could accept for assisted living as a private pay. Discussed that monthly cost for assisted living is $4000, patient receives $1400 in social security, no pension.

## 2023-11-24 NOTE — PROGRESS NOTES
Patient arrived to IR for right chest tube placement    The procedure and risks were discussed with the patient. All questions were answered. Informed consent was obtained.

## 2023-11-24 NOTE — BRIEF OP NOTE (RAD/CATH)
INTERVENTIONAL RADIOLOGY PROCEDURE NOTE    Date: 11/24/2023    Procedure: Right chest tube placement  Procedure Summary       Date:  Room / Location:     Anesthesia Start:  Anesthesia Stop:     Procedure:  Diagnosis:     Scheduled Providers:  Responsible Provider:     Anesthesia Type: Not recorded ASA Status: Not recorded            Preoperative diagnosis:   1. Pleural effusion on right    2. Pleural effusion    3. Generalized abdominal pain         Postoperative diagnosis: Same. Surgeon: Merril Bloch, DO     Assistant: None. No qualified resident was available. Blood loss: minimal    Specimens: none     Findings: 10 [place] chest tube placed right pleural space with ultrasound guidane    Complications: None immediate.     Anesthesia: local and IV Fentanyl

## 2023-11-24 NOTE — PROGRESS NOTES
Progress Note - Pulmonary   Magaly Goldman 76 y.o. female MRN: [id number]  Unit/Bed#: -01 Encounter: [id number]    Assessment:  Acute hypoxemic pulmonary insufficiency  Abnormal chest CT with RML/RLL atelectasis and loculated right pleural effusion  Rheumatoid arthritis  Nicotine dependence    Plan:  Acute hypoxemic respite insufficiency with loculated right-sided exudative effusion  Suspect parapneumonic related to possible aspiration during her recent illness with nausea/vomiting and encephalopathy  Chest x-ray done this morning looks to have increased effusion on the right  Plan for likely chest tube today with IR  May need tPA/dornase depending on output from the chest tube  Will continue to follow    Subjective:   Patient resting in bed. Continues to have some shortness of breath as well as discomfort on the right side of her back. Objective:     Vitals: Blood pressure 92/60, pulse 98, temperature 97.5 °F (36.4 °C), resp. rate 18, height 5' 3" (1.6 m), weight 43.8 kg (96 lb 9 oz), SpO2 92 %. ,Body mass index is 17.11 kg/m². Intake/Output Summary (Last 24 hours) at 11/24/2023 0845  Last data filed at 11/23/2023 1849  Gross per 24 hour   Intake 240 ml   Output 200 ml   Net 40 ml       Invasive Devices       Peripheral Intravenous Line  Duration             Long-Dwell Peripheral IV (Midline) 89/35/00 Right Basilic 3 days                    Physical Exam: BP 92/60   Pulse 98   Temp 97.5 °F (36.4 °C)   Resp 18   Ht 5' 3" (1.6 m)   Wt 43.8 kg (96 lb 9 oz)   SpO2 92%   BMI 17.11 kg/m²   General appearance: alert and oriented, in no acute distress  Head: Normocephalic, without obvious abnormality, atraumatic  Eyes: negative findings: conjunctivae and sclerae normal  Lungs:  diminished right base  Heart: regular rate and rhythm  Abdomen: normal findings: soft, non-tender  Extremities:  no edema  Skin:  warm and dry  Neurologic: Mental status: Alert, oriented, thought content appropriate     Labs:  I have personally reviewed pertinent lab results. , CBC:   Lab Results   Component Value Date    WBC 13.59 (H) 11/24/2023    HGB 11.8 11/24/2023    HCT 38.1 11/24/2023    MCV 96 11/24/2023     11/24/2023    RBC 3.97 11/24/2023    MCH 29.7 11/24/2023    MCHC 31.0 (L) 11/24/2023    RDW 15.3 (H) 11/24/2023    MPV 11.0 11/24/2023    NRBC 0 11/24/2023   , CMP:   Lab Results   Component Value Date    SODIUM 137 11/24/2023    K 4.8 11/24/2023     11/24/2023    CO2 29 11/24/2023    BUN 23 11/24/2023    CREATININE 0.60 11/24/2023    CALCIUM 9.2 11/24/2023    EGFR 93 11/24/2023     Imaging and other studies: I have personally reviewed pertinent reports.    and I have personally reviewed pertinent films in PACS

## 2023-11-24 NOTE — CASE MANAGEMENT
Case Management Progress Note    Patient name Joselyn Boyd  Location /-01 MRN [id number]  : 1955 Date 2023       LOS (days): 6  Geometric Mean LOS (GMLOS) (days): 3.10  Days to GMLOS:-3.2        OBJECTIVE:        Current admission status: Inpatient  Preferred Pharmacy:   [facility] #[id number] - [address], [address] [place] [address]  Phone: [phone] Fax: [phone]    Primary Care Provider: No primary care provider on file. Primary Insurance: [employer] [facility] REP  Secondary Insurance:     PROGRESS NOTE:  Discussed with patient her discharge plans, and if she would be able to go back to her prior living situation. Patient states she cannot, as Jhoan Noel does not want her to live there anymore and will not  or return phone calls. Asked if she had anyone she could live with, and she stated she did not. Discussed need for STR given her prolonged hospital stay and condition, and she was agreeable. She stated she wanted to go to an assisted living facility, ideally somewhere in [facility]. Discussed her finances briefly in order to have conversations with LTC. Patient does not own a car or house, has no assets. Does not have a pension, collect social security of $1400 monthly. Has a checking account with less than $500 in it, denies other funds, accounts, or stocks, etc.     Gave contact information for a sister Kyra Connors and a grandson's fiancee that she has been in contact with. Updated demographics to reflect contacts. Discussed with her attempting to call [facility] Noel again and let her know she is trying to go to an assisted living facility, and if perhaps this person could deliver her personal belongings to her. Patient stated she will attempt.

## 2023-11-24 NOTE — ASSESSMENT & PLAN NOTE
Incidental finding, asymptomatic without oxygen requirements  Unclear etiology, refer to CT imaging report  Low suspicion for acute bacterial pneumonia given absence of fevers, leukocytosis, and oxygen requirements  Pulmonology and IR consulted for further management  Status post thoracentesis with 400 cc fluid removed  Repeat chest x-ray shows worsening of the pleural effusion  Repeat thoracentesis with possible chest tube placement on 11/24/2023  Given loculated nature of the effusion, may need thoracentesis again with chest tube placement. Input from pulmonary and interventional radiology teams personally appreciated.

## 2023-11-24 NOTE — CASE MANAGEMENT
Case Management Progress Note    Patient name Niki Agustin  Location /-01 MRN [id number]  : 1955 Date 2023       LOS (days): 6  Geometric Mean LOS (GMLOS) (days): 3.10  Days to GMLOS:-3        OBJECTIVE:        Current admission status: Inpatient  Preferred Pharmacy:   [address] [place] [address] Ave #[id number] - [address] [facility], [address] [place] [address]  Phone: [phone] Fax: [phone]    Primary Care Provider: No primary care provider on file. Primary Insurance: [employer] [facility] REP  Secondary Insurance:     PROGRESS NOTE:  Attempted to discuss patient's discharge plan with her at the bedside. Patient was asleep and did not respond to name twice. Will try again later in day.

## 2023-11-24 NOTE — PLAN OF CARE
Problem: PAIN - ADULT  Goal: Verbalizes/displays adequate comfort level or baseline comfort level  Description: Interventions:  - Encourage patient to monitor pain and request assistance  - Assess pain using appropriate pain scale  - Administer analgesics based on type and severity of pain and evaluate response  - Implement non-pharmacological measures as appropriate and evaluate response  - Consider cultural and social influences on pain and pain management  - Notify physician/advanced practitioner if interventions unsuccessful or patient reports new pain  Outcome: Progressing     Problem: INFECTION - ADULT  Goal: Absence or prevention of progression during hospitalization  Description: INTERVENTIONS:  - Assess and monitor for signs and symptoms of infection  - Monitor lab/diagnostic results  - Monitor all insertion sites, i.e. indwelling lines, tubes, and drains  - Monitor endotracheal if appropriate and nasal secretions for changes in amount and color  - [place] appropriate cooling/warming therapies per order  - Administer medications as ordered  - Instruct and encourage patient and family to use good hand hygiene technique  - Identify and instruct in appropriate isolation precautions for identified infection/condition  Outcome: Progressing  Goal: Absence of fever/infection during neutropenic period  Description: INTERVENTIONS:  - Monitor WBC    Outcome: Progressing     Problem: SAFETY ADULT  Goal: Maintain or return to baseline ADL function  Description: INTERVENTIONS:  -  Assess patient's ability to carry out ADLs; assess patient's baseline for ADL function and identify physical deficits which impact ability to perform ADLs (bathing, care of mouth/teeth, toileting, grooming, dressing, etc.)  - Assess/evaluate cause of self-care deficits   - Assess range of motion  - Assess patient's mobility; develop plan if impaired  - Assess patient's need for assistive devices and provide as appropriate  - Encourage maximum independence but intervene and supervise when necessary  - Involve family in performance of ADLs  - Assess for home care needs following discharge   - Consider OT consult to assist with ADL evaluation and planning for discharge  - Provide patient education as appropriate  Outcome: Progressing  Goal: Maintains/Returns to pre admission functional level  Description: INTERVENTIONS:  - Perform AM-PAC 6 Click Basic Mobility/ Daily Activity assessment daily.  - Set and communicate daily mobility goal to care team and patient/family/caregiver. - Collaborate with rehabilitation services on mobility goals if consulted  - Perform Range of Motion 2 times a day. - Reposition patient every 2 hours.   - Dangle patient 2 times a day  - Stand patient 2 times a day  - Ambulate patient 2 times a day  - Out of bed to chair 2 times a day   - Out of bed for meals 3 times a day  - Out of bed for toileting  - Record patient progress and toleration of activity level   Outcome: Progressing

## 2023-11-25 ENCOUNTER — APPOINTMENT (INPATIENT)
Dept: RADIOLOGY | Facility: HOSPITAL | Age: 68
DRG: 546 | End: 2023-11-25
Payer: COMMERCIAL

## 2023-11-25 PROCEDURE — 99232 SBSQ HOSP IP/OBS MODERATE 35: CPT | Performed by: INTERNAL MEDICINE

## 2023-11-25 PROCEDURE — 99232 SBSQ HOSP IP/OBS MODERATE 35: CPT | Performed by: PHYSICIAN ASSISTANT

## 2023-11-25 PROCEDURE — 71045 X-RAY EXAM CHEST 1 VIEW: CPT

## 2023-11-25 RX ADMIN — HEPARIN SODIUM 5000 UNITS: 5000 INJECTION INTRAVENOUS; SUBCUTANEOUS at 06:01

## 2023-11-25 RX ADMIN — Medication 2.5 MG: at 19:09

## 2023-11-25 RX ADMIN — PREGABALIN 150 MG: 75 CAPSULE ORAL at 09:00

## 2023-11-25 RX ADMIN — PREDNISONE 5 MG: 5 TABLET ORAL at 17:09

## 2023-11-25 RX ADMIN — PREGABALIN 150 MG: 75 CAPSULE ORAL at 17:09

## 2023-11-25 RX ADMIN — PREDNISONE 5 MG: 5 TABLET ORAL at 09:02

## 2023-11-25 RX ADMIN — HEPARIN SODIUM 5000 UNITS: 5000 INJECTION INTRAVENOUS; SUBCUTANEOUS at 21:35

## 2023-11-25 NOTE — QUICK NOTE
Patient having increased pain since having chest tube placed not responding to tylenol.  Will order low dose oxy 2.5 mg q6h prn severe pain hold for change in mentation or decreased respiration

## 2023-11-25 NOTE — ASSESSMENT & PLAN NOTE
Incidental finding, asymptomatic without oxygen requirements  Unclear etiology, refer to CT imaging report  Low suspicion for acute bacterial pneumonia given absence of fevers, leukocytosis, and oxygen requirements  Pulmonology and IR consulted for further management  Status post thoracentesis and chest tube placement on 11/24/2023  Chest tube drained well overnight and chest x-ray seems improved  Pulmonary input appreciated and they recommend to monitor the patient overnight as the chest tube drainage has significantly decreased since morning and possibly plan a repeat CT scan of the chest in a.m.

## 2023-11-25 NOTE — PROGRESS NOTES
[address]  Progress Note  Name: Sherrill Robertson  MRN: [id number]  Unit/Bed#: -01 I Date of Admission: 11/17/2023   Date of Service: 11/25/2023 I Hospital Day: 7    Assessment/Plan   * Pleural effusion  Assessment & Plan  Incidental finding, asymptomatic without oxygen requirements  Unclear etiology, refer to CT imaging report  Low suspicion for acute bacterial pneumonia given absence of fevers, leukocytosis, and oxygen requirements  Pulmonology and IR consulted for further management  Status post thoracentesis and chest tube placement on 11/24/2023  Chest tube drained well overnight and chest x-ray seems improved  Pulmonary input appreciated and they recommend to monitor the patient overnight as the chest tube drainage has significantly decreased since morning and possibly plan a repeat CT scan of the chest in a.m. Sarcoidosis  Assessment & Plan  Does not appear to be active, hold off on any steroids right now    Moderate protein-calorie malnutrition (HCC)  Assessment & Plan  Malnutrition Findings:                                 BMI Findings:  Adult BMI Classifications: Underweight < 18.5        Body mass index is 17.11 kg/m². Advised high-protein diet    Rheumatoid arthritis (HCC)  Assessment & Plan  Continue prednisone and Lyrica           TE Pharmacologic Prophylaxis: Heparin    Patient Centered Rounds: I have performed bedside rounds with nursing staff today.   Discussions with Specialists or Other Care Team Provider: Pulmonology  Education and Discussions with Family / Patient: Patient    Current Length of Stay: 7 day(s)  Current Patient Status: Inpatient     Certification Statement: The patient will continue to require additional inpatient hospital stay due to Pleural effusion  Discharge Plan / Estimated Discharge Date: 1 to 2 days    Code Status: Level 1 - Full Code  ______________________________________________________________________________    Subjective:   Patient seen and examined by me. Did have some chest pain around the site of the tube but then it improved. Tube drainage was significant last night but decreased this morning. No fever or chills    Objective:   Vitals: Blood pressure 98/58, pulse 78, temperature (!) 97.3 °F (36.3 °C), resp. rate 19, height 5' 3" (1.6 m), weight 43.8 kg (96 lb 9 oz), SpO2 97 %.     Physical Exam:   General appearance: alert, fatigued, appears stated age, and cooperative  Constitutional- looks a little weak  HEENT - atraumatic and normocephalic  Neck- supple  Skin - no fresh rash  Extremities no fresh focal deformities  Cardiovascular- S1-S2 heard  Respiratory- bilateral air entry present, no crackles or rhonchi  Skin - no fresh rash  Abdomen - normal bowel sounds present, no rebound tenderness  CNS- No fresh focal deficits  Psych- no acute psychosis     Additional Data:   Labs:  Results from last 7 days   Lab Units 11/24/23  0530 11/23/23  1524 11/22/23  0501 11/20/23  0453 11/19/23  0936   WBC Thousand/uL 13.59* 12.58* 10.12 5.63 5.65   HEMOGLOBIN g/dL 11.8 11.6 11.4* 11.2* 11.3*   HEMATOCRIT % 38.1 36.3 37.6 36.4 36.6   MCV fL 96 96 97 98 98   PLATELETS Thousands/uL 301 270 213 257 260     Results from last 7 days   Lab Units 11/24/23  0530 11/22/23  0501 11/21/23  0452 11/20/23  0453 11/19/23  0936   SODIUM mmol/L 137 143 143 145 141   POTASSIUM mmol/L 4.8 4.6 4.9 4.3 4.6   CHLORIDE mmol/L 102 106 109* 110* 110*   CO2 mmol/L 29 32 30 30 24   ANION GAP mmol/L 6 5 4 5 7   BUN mg/dL 23 13 13 11 12   CREATININE mg/dL 0.60 0.62 0.59* 0.60 0.61   CALCIUM mg/dL 9.2 9.3 9.0 9.0 8.9   ALBUMIN g/dL  --   --  3.1* 3.1* 3.4*   TOTAL BILIRUBIN mg/dL  --   --  0.24 0.22 0.25   ALK PHOS U/L  --   --  72 90 92   ALT U/L  --   --  6* 6* 5*   AST U/L  --   --  14 10* 12*   EGFR ml/min/1.73sq m 93 92 94 93 93   GLUCOSE RANDOM mg/dL 115 98 107 99 145*     Results from last 7 days   Lab Units 11/21/23  0452 11/20/23  0453 11/19/23  0936   MAGNESIUM mg/dL 2.1 2.0 1.9              Results from last 7 days   Lab Units 11/18/23  1853   PROCALCITONIN ng/ml 0.07     Results from last 7 days   Lab Units 11/18/23  1723   POC GLUCOSE mg/dl 159*             * I Have Reviewed All Lab Data Listed Above. Cultures:   Results from last 7 days   Lab Units 11/20/23  0750   GRAM STAIN RESULT  Rare Polys  No bacteria seen   BODY FLUID CULTURE, STERILE  No growth             Imaging:  Imaging Reports Reviewed Today Include:   XR chest portable    Result Date: 11/22/2023  Impression: No significant change in size or appearance of right pleural effusion from prior exam Workstation performed: [id number]     XR chest portable    Result Date: 11/21/2023  Impression: Small right pleural effusion and right basilar linear atelectasis. Workstation performed: [id number]     CT chest wo contrast    Result Date: 11/21/2023  Impression: Small loculated right pleural effusion, slightly decreased from 11/17/2023. Trace left pleural effusion, slightly increased from 11/17/2023. Mild dependent tree-in-bud nodularity in both upper lobes, bronchiolitis versus aspiration. Improved atelectasis in the right lung. Redemonstration of 1.4 cm low-attenuation right lower lobe nodule. As previously recommended, follow-up can be obtained with a noncontrasted abdominal CT or MRI in 12 months with biochemical evaluation to rule out functioning adenoma if not already performed. Workstation performed: [id number]     IR IN-Patient Thoracentesis    Result Date: 11/20/2023  Impression: Ultrasound-guided right thoracentesis with drainage of 400 mL of cloudy yellow pleural fluid.  Plan: Resume care by clinical team. Workstation performed: [id number]     CT chest abdomen pelvis w contrast    Result Date: 11/17/2023  Impression: Increasing loculated effusion in the right mid and lower chest. Atelectasis in the right chest has progressed to an even greater degree than pleural effusions and there is now slight rightward shift of the heart and mediastinal structures. Otherwise no significant interval change. Osteopenia with numerous healed chronic fractures reidentified. Reidentified circumscribed 14 mm right adrenal nodule. Although its imaging features are indeterminate, it does not have suspicious imaging features (heterogeneity, necrosis, irregular margins), therefore this is likely benign, and can be followed by non-contrast abdomen CT or MRI in 12 months. Please note that for adrenal nodule > 1cm,  biochemical evaluation is suggested to rule out functioning adenoma, if not already performed. Adrenal recommendation based on institutional consensus and Journal of [employer] of Radiology 0110;96:8950-8039 Workstation performed: [id number]     CT head without contrast    Result Date: 11/17/2023  Impression: No acute intracranial abnormality. Workstation performed: [id number]     Scheduled Meds:  Current Facility-Administered Medications   Medication Dose Route Frequency Provider Last Rate    acetaminophen  650 mg Oral Q6H PRN Joshua Aburto MD      heparin (porcine)  5,000 Units Subcutaneous Q8H Franklin Mejia MD      nicotine  1 patch Transdermal Daily Joshua Aburto MD      ondansetron  4 mg Intravenous Q6H PRN Joshua Aburto MD      oxyCODONE  2.5 mg Oral Q6H PRN KACY Barrera      predniSONE  5 mg Oral BID With Meals Madi Ly MD      pregabalin  150 mg Oral BID MD Danilo Ann MD  [facility]    ** Please Note: This note has been constructed using a voice recognition system.  **

## 2023-11-25 NOTE — PROGRESS NOTES
Progress Note - Pulmonary   Red Diver 76 y.o. female MRN: [id number]  Unit/Bed#: -01 Encounter: [id number]    Assessment & Plan:  Exudative pleural effusion, R  Sarcoidosis  RA  Abnormal chest CT    Patient stable on room air  S/p IR chest tube placement yesterday. Has drained 300 mL yellow pleural fluid. CXR this AM shows significant improvement   Hold on TPA for now, continue to allow chest tube drainage for now  Repeat CT chest wo contrast tomorrow morning and additional recommendations will be made based on results    Case and plan was discussed with Dr. Ariel Hernandez attending Dr. Shanice Banuelos , nursing    Subjective:   Nirali Freire says she is feeling much better. Not having SOB or CP. Objective:     Vitals: Blood pressure 98/58, pulse 78, temperature (!) 97.3 °F (36.3 °C), resp. rate 19, height 5' 3" (1.6 m), weight 43.8 kg (96 lb 9 oz), SpO2 97 %. ,Body mass index is 17.11 kg/m². Intake/Output Summary (Last 24 hours) at 11/25/2023 1519  Last data filed at 11/25/2023 0830  Gross per 24 hour   Intake 370 ml   Output --   Net 370 ml       Invasive Devices       Peripheral Intravenous Line  Duration             Long-Dwell Peripheral IV (Midline) 48/95/25 Right Basilic 5 days              Drain  Duration             Chest Tube 1 Right Pleural;Posterior 10.2 Fr. 1 day                    Physical Exam:   General appearance: Alert and awake, in no acute distress  Head: Normocephalic, without obvious abnormality, atraumatic  Eyes: No scleral icterus   Lungs: Decreased breath sounds. R chest tube in place w 300 cc clear yellow fluid in drainage system  Heart: Regular rate  Abdomen:  No appreciable distension or tenderness  Extremities: No deformity  Skin: Warm and dry  Neurologic: No acute focal deficits are noted    Labs: I have personally reviewed pertinent lab results. Imaging and other studies: I have personally reviewed pertinent reports.

## 2023-11-26 ENCOUNTER — APPOINTMENT (INPATIENT)
Dept: CT IMAGING | Facility: HOSPITAL | Age: 68
DRG: 546 | End: 2023-11-26
Payer: COMMERCIAL

## 2023-11-26 LAB
ANION GAP SERPL CALCULATED.3IONS-SCNC: 7 MMOL/L
BUN SERPL-MCNC: 23 MG/DL (ref 5–25)
CALCIUM SERPL-MCNC: 9.5 MG/DL (ref 8.4–10.2)
CHLORIDE SERPL-SCNC: 102 MMOL/L (ref 96–108)
CO2 SERPL-SCNC: 31 MMOL/L (ref 21–32)
CREAT SERPL-MCNC: 0.59 MG/DL (ref 0.6–1.3)
ERYTHROCYTE [DISTWIDTH] IN BLOOD BY AUTOMATED COUNT: 15.2 % (ref 11.6–15.1)
GFR SERPL CREATININE-BSD FRML MDRD: 94 ML/MIN/1.73SQ M
GLUCOSE SERPL-MCNC: 110 MG/DL (ref 65–140)
HCT VFR BLD AUTO: 37 % (ref 34.8–46.1)
HGB BLD-MCNC: 11.4 G/DL (ref 11.5–15.4)
MCH RBC QN AUTO: 30 PG (ref 26.8–34.3)
MCHC RBC AUTO-ENTMCNC: 30.8 G/DL (ref 31.4–37.4)
MCV RBC AUTO: 97 FL (ref 82–98)
PLATELET # BLD AUTO: 318 THOUSANDS/UL (ref 149–390)
PMV BLD AUTO: 11.9 FL (ref 8.9–12.7)
POTASSIUM SERPL-SCNC: 4.4 MMOL/L (ref 3.5–5.3)
RBC # BLD AUTO: 3.8 MILLION/UL (ref 3.81–5.12)
SODIUM SERPL-SCNC: 140 MMOL/L (ref 135–147)
WBC # BLD AUTO: 9.44 THOUSAND/UL (ref 4.31–10.16)

## 2023-11-26 PROCEDURE — 80048 BASIC METABOLIC PNL TOTAL CA: CPT | Performed by: INTERNAL MEDICINE

## 2023-11-26 PROCEDURE — 99232 SBSQ HOSP IP/OBS MODERATE 35: CPT | Performed by: PHYSICIAN ASSISTANT

## 2023-11-26 PROCEDURE — 71250 CT THORAX DX C-: CPT

## 2023-11-26 PROCEDURE — 85027 COMPLETE CBC AUTOMATED: CPT | Performed by: INTERNAL MEDICINE

## 2023-11-26 PROCEDURE — 99232 SBSQ HOSP IP/OBS MODERATE 35: CPT | Performed by: INTERNAL MEDICINE

## 2023-11-26 PROCEDURE — G1004 CDSM NDSC: HCPCS

## 2023-11-26 RX ADMIN — HEPARIN SODIUM 5000 UNITS: 5000 INJECTION INTRAVENOUS; SUBCUTANEOUS at 21:20

## 2023-11-26 RX ADMIN — PREGABALIN 150 MG: 75 CAPSULE ORAL at 08:19

## 2023-11-26 RX ADMIN — Medication 2.5 MG: at 20:25

## 2023-11-26 RX ADMIN — PREDNISONE 5 MG: 5 TABLET ORAL at 08:19

## 2023-11-26 RX ADMIN — HEPARIN SODIUM 5000 UNITS: 5000 INJECTION INTRAVENOUS; SUBCUTANEOUS at 05:44

## 2023-11-26 RX ADMIN — HEPARIN SODIUM 5000 UNITS: 5000 INJECTION INTRAVENOUS; SUBCUTANEOUS at 14:35

## 2023-11-26 RX ADMIN — Medication 2.5 MG: at 05:47

## 2023-11-26 RX ADMIN — PREDNISONE 5 MG: 5 TABLET ORAL at 17:13

## 2023-11-26 RX ADMIN — PREGABALIN 150 MG: 75 CAPSULE ORAL at 17:13

## 2023-11-26 NOTE — PROGRESS NOTES
[address]  Progress Note  Name: Willian Rodriguez  MRN: [id number]  Unit/Bed#: -01 I Date of Admission: 11/17/2023   Date of Service: 11/26/2023 I Hospital Day: 8    Assessment/Plan   * Pleural effusion  Assessment & Plan  Incidental finding, asymptomatic without oxygen requirements  Unclear etiology, refer to CT imaging report  Low suspicion for acute bacterial pneumonia given absence of fevers, leukocytosis, and oxygen requirements  Pulmonology and IR consulted for further management  Status post thoracentesis and chest tube placement on 11/24/2023  Chest tube drained well overnight and chest x-ray seems improved  Pulmonary input appreciated and they recommend to monitor the patient overnight and if patient improves consider chest tube removal on 11/27/2023    Sarcoidosis  Assessment & Plan  Does not appear to be active, hold off on any steroids right now. Moderate protein-calorie malnutrition ([address])  Assessment & Plan  Malnutrition Findings:                                 BMI Findings:  Adult BMI Classifications: Underweight < 18.5        Body mass index is 17.11 kg/m². Advised high-protein diet. Rheumatoid arthritis (HCC)  Assessment & Plan  Continue prednisone and Lyrica. TE Pharmacologic Prophylaxis: Heparin    Patient Centered Rounds: I have performed bedside rounds with nursing staff today. Discussions with Specialists or Other Care Team Provider: Pulmonology  Education and Discussions with Family / Patient: Patient    Current Length of Stay: 8 day(s)  Current Patient Status: Inpatient     Certification Statement: The patient will continue to require additional inpatient hospital stay due to Pleural effusion  Discharge Plan / Estimated Discharge Date: 1 to 2 days    Code Status: Level 1 - Full Code  ______________________________________________________________________________    Subjective:   Patient seen and examined by me.   No chest pain, palpitations or diaphoresis. No fever or chills at this point of time. Patient does have weakness and the weakness is generalized. Does have some pain around the site of the chest tube. Objective:   Vitals: Blood pressure 102/68, pulse 98, temperature (!) 97.4 °F (36.3 °C), resp. rate 19, height 5' 3" (1.6 m), weight 43.8 kg (96 lb 9 oz), SpO2 92 %.     Physical Exam:   General appearance: alert, fatigued, appears stated age, and cooperative  Constitutional- looks a little weak  HEENT - atraumatic and normocephalic  Neck- supple  Skin - no fresh rash  Extremities no fresh focal deformities  Cardiovascular- S1-S2 heard  Respiratory- bilateral air entry present, no crackles or rhonchi  Skin - no fresh rash  Abdomen - normal bowel sounds present, no rebound tenderness  CNS- No fresh focal deficits  Psych- no acute psychosis     Additional Data:   Labs:  Results from last 7 days   Lab Units 11/26/23  0534 11/24/23  0530 11/23/23  1524 11/22/23  0501 11/20/23  0453   WBC Thousand/uL 9.44 13.59* 12.58* 10.12 5.63   HEMOGLOBIN g/dL 11.4* 11.8 11.6 11.4* 11.2*   HEMATOCRIT % 37.0 38.1 36.3 37.6 36.4   MCV fL 97 96 96 97 98   PLATELETS Thousands/uL 318 301 270 213 257     Results from last 7 days   Lab Units 11/26/23  0534 11/24/23  0530 11/22/23  0501 11/21/23  0452 11/20/23  0453   SODIUM mmol/L 140 137 143 143 145   POTASSIUM mmol/L 4.4 4.8 4.6 4.9 4.3   CHLORIDE mmol/L 102 102 106 109* 110*   CO2 mmol/L 31 29 32 30 30   ANION GAP mmol/L 7 6 5 4 5   BUN mg/dL 23 23 13 13 11   CREATININE mg/dL 0.59* 0.60 0.62 0.59* 0.60   CALCIUM mg/dL 9.5 9.2 9.3 9.0 9.0   ALBUMIN g/dL  --   --   --  3.1* 3.1*   TOTAL BILIRUBIN mg/dL  --   --   --  0.24 0.22   ALK PHOS U/L  --   --   --  72 90   ALT U/L  --   --   --  6* 6*   AST U/L  --   --   --  14 10*   EGFR ml/min/1.73sq m 94 93 92 94 93   GLUCOSE RANDOM mg/dL 110 115 98 107 99     Results from last 7 days   Lab Units 11/21/23  0452 11/20/23  0453   MAGNESIUM mg/dL 2.1 2.0 * I Have Reviewed All Lab Data Listed Above. Cultures:   Results from last 7 days   Lab Units 11/20/23  0750   GRAM STAIN RESULT  Rare Polys  No bacteria seen   BODY FLUID CULTURE, STERILE  No growth             Imaging:  Imaging Reports Reviewed Today Include:   XR chest portable    Result Date: 11/22/2023  Impression: No significant change in size or appearance of right pleural effusion from prior exam Workstation performed: [id number]     XR chest portable    Result Date: 11/21/2023  Impression: Small right pleural effusion and right basilar linear atelectasis. Workstation performed: [id number]     CT chest wo contrast    Result Date: 11/21/2023  Impression: Small loculated right pleural effusion, slightly decreased from 11/17/2023. Trace left pleural effusion, slightly increased from 11/17/2023. Mild dependent tree-in-bud nodularity in both upper lobes, bronchiolitis versus aspiration. Improved atelectasis in the right lung. Redemonstration of 1.4 cm low-attenuation right lower lobe nodule. As previously recommended, follow-up can be obtained with a noncontrasted abdominal CT or MRI in 12 months with biochemical evaluation to rule out functioning adenoma if not already performed. Workstation performed: [id number]     IR IN-Patient Thoracentesis    Result Date: 11/20/2023  Impression: Ultrasound-guided right thoracentesis with drainage of 400 mL of cloudy yellow pleural fluid. Plan: Resume care by clinical team. Workstation performed: [id number]     CT chest abdomen pelvis w contrast    Result Date: 11/17/2023  Impression: Increasing loculated effusion in the right mid and lower chest. Atelectasis in the right chest has progressed to an even greater degree than pleural effusions and there is now slight rightward shift of the heart and mediastinal structures. Otherwise no significant interval change. Osteopenia with numerous healed chronic fractures reidentified.  Reidentified circumscribed 14 mm right adrenal nodule. Although its imaging features are indeterminate, it does not have suspicious imaging features (heterogeneity, necrosis, irregular margins), therefore this is likely benign, and can be followed by non-contrast abdomen CT or MRI in 12 months. Please note that for adrenal nodule > 1cm,  biochemical evaluation is suggested to rule out functioning adenoma, if not already performed. Adrenal recommendation based on institutional consensus and [employer] of [employer]iology 1695;46:1101-5028 Workstation performed: [id number]     CT head without contrast    Result Date: 11/17/2023  Impression: No acute intracranial abnormality. Workstation performed: [id number]     Scheduled Meds:  Current Facility-Administered Medications   Medication Dose Route Frequency Provider Last Rate    acetaminophen  650 mg Oral Q6H PRN Joshua Aburto MD      heparin (porcine)  5,000 Units Subcutaneous Q8H Jesus Johnson MD      nicotine  1 patch Transdermal Daily Joshua Aburto MD      ondansetron  4 mg Intravenous Q6H PRN Joshua Aburto MD      oxyCODONE  2.5 mg Oral Q6H PRN KACY Fuentes      predniSONE  5 mg Oral BID With Meals Ramon Vital MD      pregabalin  150 mg Oral BID MD Jessica Redd MD  [facility]    ** Please Note: This note has been constructed using a voice recognition system.  **

## 2023-11-26 NOTE — PROGRESS NOTES
Progress Note - Pulmonary   [place] President 76 y.o. female MRN: [id number]  Unit/Bed#: -Sadie Encounter: [id number]    Assessment & Plan:  Exudative pleural effusion, R  Sarcoidosis  RA  Abnormal chest CT    Patient remains stable on room air  S/p IR chest tube placement 11/24. Has drained about 400 mL since being placed, ~100 over the last 24 H  Yesterday's CXR shows significant improvement   F/u CT chest today, depending on results and drainage over next 24 H chest tube can likely be discontinued tomorrow     Discussed with nursing     Subjective:   Patient reports only minimal SOB, continues to improve day by day. No CP. Mild dry cough. Objective:     Vitals: Blood pressure 109/68, pulse 82, temperature (!) 97.1 °F (36.2 °C), resp. rate 19, height 5' 3" (1.6 m), weight 43.8 kg (96 lb 9 oz), SpO2 93 %. ,Body mass index is 17.11 kg/m². Intake/Output Summary (Last 24 hours) at 11/26/2023 5446  Last data filed at 11/26/2023 3133  Gross per 24 hour   Intake 240 ml   Output 775 ml   Net -535 ml         Invasive Devices       Peripheral Intravenous Line  Duration             Long-Dwell Peripheral IV (Midline) 90/92/52 Right Basilic 5 days              Drain  Duration             Chest Tube 1 Right Pleural;Posterior 10.2 Fr. 1 day                    Physical Exam:   General appearance: Alert and awake, in no acute distress  Head: Normocephalic, without obvious abnormality, atraumatic  Eyes: No scleral icterus   Lungs:Decreased breath sounds. R sided chest tube in place to suction, no air leak approx 400 mL clear yellow pleural fluid in the drainage system   Heart: Regular rate  Abdomen:  No appreciable distension or tenderness  Extremities: No deformity, no LE edema  Skin: Warm and dry  Neurologic: No acute focal deficits are noted    Labs: I have personally reviewed pertinent lab results. Imaging and other studies: I have personally reviewed pertinent reports.

## 2023-11-26 NOTE — ASSESSMENT & PLAN NOTE
Incidental finding, asymptomatic without oxygen requirements  Unclear etiology, refer to CT imaging report  Low suspicion for acute bacterial pneumonia given absence of fevers, leukocytosis, and oxygen requirements  Pulmonology and IR consulted for further management  Status post thoracentesis and chest tube placement on 11/24/2023  Chest tube drained well overnight and chest x-ray seems improved  Pulmonary input appreciated and they recommend to monitor the patient overnight and if patient improves consider chest tube removal on 11/27/2023

## 2023-11-27 ENCOUNTER — APPOINTMENT (INPATIENT)
Dept: RADIOLOGY | Facility: HOSPITAL | Age: 68
DRG: 546 | End: 2023-11-27
Payer: COMMERCIAL

## 2023-11-27 LAB
ANION GAP SERPL CALCULATED.3IONS-SCNC: 5 MMOL/L
BUN SERPL-MCNC: 25 MG/DL (ref 5–25)
CALCIUM SERPL-MCNC: 9.2 MG/DL (ref 8.4–10.2)
CHLORIDE SERPL-SCNC: 100 MMOL/L (ref 96–108)
CO2 SERPL-SCNC: 32 MMOL/L (ref 21–32)
CREAT SERPL-MCNC: 0.62 MG/DL (ref 0.6–1.3)
ERYTHROCYTE [DISTWIDTH] IN BLOOD BY AUTOMATED COUNT: 15.4 % (ref 11.6–15.1)
GFR SERPL CREATININE-BSD FRML MDRD: 92 ML/MIN/1.73SQ M
GLUCOSE SERPL-MCNC: 104 MG/DL (ref 65–140)
HCT VFR BLD AUTO: 40 % (ref 34.8–46.1)
HGB BLD-MCNC: 12.3 G/DL (ref 11.5–15.4)
MCH RBC QN AUTO: 29.7 PG (ref 26.8–34.3)
MCHC RBC AUTO-ENTMCNC: 30.8 G/DL (ref 31.4–37.4)
MCV RBC AUTO: 97 FL (ref 82–98)
PLATELET # BLD AUTO: 185 THOUSANDS/UL (ref 149–390)
PMV BLD AUTO: 11.7 FL (ref 8.9–12.7)
POTASSIUM SERPL-SCNC: 5.1 MMOL/L (ref 3.5–5.3)
RBC # BLD AUTO: 4.14 MILLION/UL (ref 3.81–5.12)
SODIUM SERPL-SCNC: 137 MMOL/L (ref 135–147)
WBC # BLD AUTO: 7.09 THOUSAND/UL (ref 4.31–10.16)

## 2023-11-27 PROCEDURE — 80048 BASIC METABOLIC PNL TOTAL CA: CPT | Performed by: INTERNAL MEDICINE

## 2023-11-27 PROCEDURE — 99232 SBSQ HOSP IP/OBS MODERATE 35: CPT | Performed by: INTERNAL MEDICINE

## 2023-11-27 PROCEDURE — 71045 X-RAY EXAM CHEST 1 VIEW: CPT

## 2023-11-27 PROCEDURE — 88112 CYTOPATH CELL ENHANCE TECH: CPT | Performed by: STUDENT IN AN ORGANIZED HEALTH CARE EDUCATION/TRAINING PROGRAM

## 2023-11-27 PROCEDURE — 85027 COMPLETE CBC AUTOMATED: CPT | Performed by: INTERNAL MEDICINE

## 2023-11-27 PROCEDURE — 88305 TISSUE EXAM BY PATHOLOGIST: CPT | Performed by: STUDENT IN AN ORGANIZED HEALTH CARE EDUCATION/TRAINING PROGRAM

## 2023-11-27 RX ADMIN — HEPARIN SODIUM 5000 UNITS: 5000 INJECTION INTRAVENOUS; SUBCUTANEOUS at 21:30

## 2023-11-27 RX ADMIN — PREDNISONE 5 MG: 5 TABLET ORAL at 08:03

## 2023-11-27 RX ADMIN — Medication 2.5 MG: at 21:30

## 2023-11-27 RX ADMIN — PREGABALIN 150 MG: 75 CAPSULE ORAL at 18:03

## 2023-11-27 RX ADMIN — PREGABALIN 150 MG: 75 CAPSULE ORAL at 08:03

## 2023-11-27 RX ADMIN — Medication 2.5 MG: at 10:29

## 2023-11-27 RX ADMIN — HEPARIN SODIUM 5000 UNITS: 5000 INJECTION INTRAVENOUS; SUBCUTANEOUS at 14:14

## 2023-11-27 RX ADMIN — ONDANSETRON 4 MG: 2 INJECTION INTRAMUSCULAR; INTRAVENOUS at 10:34

## 2023-11-27 RX ADMIN — PREDNISONE 5 MG: 5 TABLET ORAL at 16:25

## 2023-11-27 RX ADMIN — HEPARIN SODIUM 5000 UNITS: 5000 INJECTION INTRAVENOUS; SUBCUTANEOUS at 05:34

## 2023-11-27 NOTE — PROGRESS NOTES
Progress Note - Pulmonary   Candie Ship 76 y.o. female MRN: [id number]  Unit/Bed#: -01 Encounter: [id number]    Assessment:  Exudative right-sided pleural effusion  Rheumatoid arthritis and sarcoidosis  Abnormal chest CT    Plan:  Patient remained stable on room air  Chest tube was placed on 11/24/2023, has drained about 400 mL since being placed  Over the last 24 hours has had 25 mL output  Of note this morning the chest tube was not on suction, unclear how long it has been off suction  Placed back to suction this morning, if no significant output throughout the day, can likely remove the chest tube today  CT chest done yesterday showed loculated right hydropneumothorax with pleural thickening  Will hold off on tPA/dornase, would need repeat imaging in 6 to 8 weeks to assess the remaining effusion  Will continue to follow    Subjective:   Patient resting in bed. Having some discomfort at the site of the chest tube. Objective:     Vitals: Blood pressure 97/62, pulse 86, temperature (!) 97.4 °F (36.3 °C), resp. rate 14, height 5' 3" (1.6 m), weight 43.8 kg (96 lb 9 oz), SpO2 92 %. ,Body mass index is 17.11 kg/m².       Intake/Output Summary (Last 24 hours) at 11/27/2023 1012  Last data filed at 11/27/2023 4471  Gross per 24 hour   Intake 120 ml   Output 2075 ml   Net -1955 ml       Invasive Devices       Peripheral Intravenous Line  Duration             Long-Dwell Peripheral IV (Midline) 57/38/70 Right Basilic 6 days              Drain  Duration             Chest Tube 1 Right Pleural;Posterior 10.2 Fr. 2 days                    Physical Exam: BP 97/62   Pulse 86   Temp (!) 97.4 °F (36.3 °C)   Resp 14   Ht 5' 3" (1.6 m)   Wt 43.8 kg (96 lb 9 oz)   SpO2 92%   BMI 17.11 kg/m²   General appearance: alert and oriented, in no acute distress  Head: Normocephalic, without obvious abnormality, atraumatic  Eyes: negative findings: conjunctivae and sclerae normal  Lungs:  diminished right base, chest tube in place  Heart: regular rate and rhythm  Abdomen: normal findings: soft, non-tender  Extremities:  no edema  Skin:  warm and dry  Neurologic: Mental status: Alert, oriented, thought content appropriate     Labs: I have personally reviewed pertinent lab results. , CBC:   Lab Results   Component Value Date    WBC 7.09 11/27/2023    HGB 12.3 11/27/2023    HCT 40.0 11/27/2023    MCV 97 11/27/2023     11/27/2023    RBC 4.14 11/27/2023    MCH 29.7 11/27/2023    MCHC 30.8 (L) 11/27/2023    RDW 15.4 (H) 11/27/2023    MPV 11.7 11/27/2023   , CMP:   Lab Results   Component Value Date    SODIUM 137 11/27/2023    K 5.1 11/27/2023     11/27/2023    CO2 32 11/27/2023    BUN 25 11/27/2023    CREATININE 0.62 11/27/2023    CALCIUM 9.2 11/27/2023    EGFR 92 11/27/2023     Imaging and other studies: I have personally reviewed pertinent reports.    and I have personally reviewed pertinent films in PACS

## 2023-11-27 NOTE — ASSESSMENT & PLAN NOTE
Incidental finding, currently asymptomatic oxygen requirements  Pulmonology and IR on board  Status postthoracentesis of chest tube placement on 11/24  Chest tube was removed on 11/27  Stable for discharge from medical standpoint, will have repeat CT scan in 6 to 8 weeks and outpatient follow-up with pulmonology

## 2023-11-27 NOTE — ASSESSMENT & PLAN NOTE
Malnutrition Findings:                           BMI Findings:  Adult BMI Classifications: Underweight < 18.5        Body mass index is 17.11 kg/m².      High-protein diet

## 2023-11-27 NOTE — CASE MANAGEMENT
Case Management Progress Note    Patient name Sherita Heredia  Location /-01 MRN [id number]  : 1955 Date 2023       LOS (days): 9  Geometric Mean LOS (GMLOS) (days): 3.10  Days to GMLOS:-6.2        OBJECTIVE:        Current admission status: Inpatient  Preferred Pharmacy:   [id number] - [address] [facility], [address]  Phone: [phone] Fax: [phone]    Primary Care Provider: No primary care provider on file. Primary Insurance: [facility] [address] [place] [address]  Secondary Insurance:     PROGRESS NOTE:  Discussed with patient options for STR/ LTC- [address] [place] [address], and [facility] at [facility]. Patient would like to go to [facility], as it is her preference to be near [place]. Aware they are STR only, and will assist her with MA application and assisted living facility placement. Emmanuel notified, will submit auth but waiting on PT/OT notes and recent progress notes. PT made aware need notes for auth. Report they will prioritize.

## 2023-11-27 NOTE — PROGRESS NOTES
[address]  Progress Note  Name: Ryley Hernandez  MRN: [id number]  Unit/Bed#: -01 I Date of Admission: 11/17/2023   Date of Service: 11/27/2023 I Hospital Day: 9    Assessment/Plan     Assessment/Plan   * Pleural effusion  Assessment & Plan  Incidental finding, asymptomatic without oxygen requirements  Unclear etiology, refer to CT imaging report  Low suspicion for acute bacterial pneumonia given absence of fevers, leukocytosis, and oxygen requirements  Pulmonology and IR consulted for further management  Status post thoracentesis and chest tube placement on 11/24/2023  Chest tube drained well overnight and chest x-ray seems improved  Pulmonary input appreciated and they recommend to monitor the patient overnight and if patient improves consider chest tube removal on 11/27/2023    Sarcoidosis  Assessment & Plan  Does not appear to be active, hold off on any steroids right now. Moderate protein-calorie malnutrition ([address])  Assessment & Plan  Malnutrition Findings:                                 BMI Findings:  Adult BMI Classifications: Underweight < 18.5        Body mass index is 17.11 kg/m². Advised high-protein diet. Rheumatoid arthritis ([address])  Assessment & Plan  Continue prednisone and Lyrica     Chest tube removed by interventional radiology today. Will monitor the patient closely till tomorrow      VTE Pharmacologic Prophylaxis: VTE Score: 5 High Risk (Score >/= 5) - Pharmacological DVT Prophylaxis Ordered: heparin. Sequential Compression Devices Ordered. Mobility:   Basic Mobility Inpatient Raw Score: 19  JH-HLM Goal: 6: Walk 10 steps or more  JH-HLM Achieved: 6: Walk 10 steps or more  HLM Goal achieved. Continue to encourage appropriate mobility. Patient Centered Rounds: I performed bedside rounds with nursing staff today.    Discussions with Specialists or Other Care Team Provider: pulmonology    Education and Discussions with Family / Patient: Patient declined call to . Total Time Spent on Date of Encounter in care of patient: 35 mins. This time was spent on one or more of the following: performing physical exam; counseling and coordination of care; obtaining or reviewing history; documenting in the medical record; reviewing/ordering tests, medications or procedures; communicating with other healthcare professionals and discussing with patient's family/caregivers. Current Length of Stay: 9 day(s)  Current Patient Status: Inpatient   Certification Statement: The patient will continue to require additional inpatient hospital stay due to effusion  Discharge Plan: Patient is stable and improving. Should be ready for discharge on 2023    Code Status: Level 1 - Full Code    Subjective:   Patient laying in bed, she says she's tired and notes slight increase dyspnea and increased pain at site of chest tube. Also noted some nausea, patient given zolfran. Denies any sternal chest pain, palpitations, hemoptysis, cough. Objective:     Vitals:   Temp (24hrs), Av.7 °F (36.5 °C), Min:97.4 °F (36.3 °C), Max:97.9 °F (36.6 °C)    Temp:  [97.4 °F (36.3 °C)-97.9 °F (36.6 °C)] 97.9 °F (36.6 °C)  HR:  [86-98] 96  Resp:  [14] 14  BP: ()/(55-67) 86/55  SpO2:  [91 %-93 %] 91 %  Body mass index is 17.11 kg/m². Input and Output Summary (last 24 hours): Intake/Output Summary (Last 24 hours) at 2023 1713  Last data filed at 2023 1300  Gross per 24 hour   Intake 360 ml   Output 1175 ml   Net -815 ml       Physical Exam:   Physical Exam  Constitutional:       Appearance: She is not toxic-appearing. HENT:      Mouth/Throat:      Mouth: Mucous membranes are moist.   Cardiovascular:      Rate and Rhythm: Normal rate and regular rhythm. Pulses: Normal pulses. Heart sounds: Normal heart sounds. No murmur heard. No friction rub. No gallop. Pulmonary:      Effort: No respiratory distress. Breath sounds: Normal breath sounds. No stridor. No wheezing, rhonchi or rales. Chest:      Chest wall: Tenderness present. Abdominal:      General: There is no distension. Palpations: Abdomen is soft. Tenderness: There is no guarding or rebound. Lymphadenopathy:      Cervical: No cervical adenopathy. Neurological:      General: No focal deficit present. Mental Status: She is alert and oriented to person, place, and time. Psychiatric:         Mood and Affect: Mood normal.         Behavior: Behavior normal.         Thought Content: Thought content normal.         Judgment: Judgment normal.      General exam- looks a little weak  HEENT - atraumatic and normocephalic  Neck- supple  Skin - no fresh rash  Extremities no fresh focal deformities  Cardiovascular- S1-S2 heard  Respiratory- bilateral air entry present, no crackles or rhonchi  Skin - no fresh rash  Abdomen - normal bowel sounds present, no rebound tenderness  CNS- No fresh focal deficits  Psych- no acute psychosis      Additional Data:     Labs:  Results from last 7 days   Lab Units 11/27/23  0513 11/26/23  0534 11/24/23  0530   WBC Thousand/uL 7.09   < > 13.59*   HEMOGLOBIN g/dL 12.3   < > 11.8   HEMATOCRIT % 40.0   < > 38.1   PLATELETS Thousands/uL 185   < > 301   NEUTROS PCT %  --   --  82*   LYMPHS PCT %  --   --  10*   MONOS PCT %  --   --  6   EOS PCT %  --   --  2    < > = values in this interval not displayed. Results from last 7 days   Lab Units 11/27/23  0513 11/22/23  0501 11/21/23  0452   SODIUM mmol/L 137   < > 143   POTASSIUM mmol/L 5.1   < > 4.9   CHLORIDE mmol/L 100   < > 109*   CO2 mmol/L 32   < > 30   BUN mg/dL 25   < > 13   CREATININE mg/dL 0.62   < > 0.59*   ANION GAP mmol/L 5   < > 4   CALCIUM mg/dL 9.2   < > 9.0   ALBUMIN g/dL  --   --  3.1*   TOTAL BILIRUBIN mg/dL  --   --  0.24   ALK PHOS U/L  --   --  72   ALT U/L  --   --  6*   AST U/L  --   --  14   GLUCOSE RANDOM mg/dL 104   < > 107    < > = values in this interval not displayed. Lines/Drains:  Invasive Devices       Peripheral Intravenous Line  Duration             Long-Dwell Peripheral IV (Midline) 78/78/67 Right Basilic 7 days              Drain  Duration             Chest Tube 1 Right Pleural;Posterior 10.2 Fr. 3 days                          Imaging: Reviewed radiology reports from this admission including: chest xray and chest CT scan    Recent Cultures (last 7 days):         Last 24 Hours Medication List:   Current Facility-Administered Medications   Medication Dose Route Frequency Provider Last Rate    acetaminophen  650 mg Oral Q6H PRN Joshua Aburto MD      heparin (porcine)  5,000 Units Subcutaneous Q8H [facility] Joshua Aburto MD      nicotine  1 patch Transdermal Daily Joshua Aburto MD      ondansetron  4 mg Intravenous Q6H PRN Joshua Aburto MD      oxyCODONE  2.5 mg Oral Q6H PRN KACY Girard      predniSONE  5 mg Oral BID With Meals Joshua Aburto MD      pregabalin  150 mg Oral BID Ivana Arizmendi MD          Today, Patient Was Seen By: Katie Kumar MD    **Please Note: This note may have been constructed using a voice recognition system. **

## 2023-11-27 NOTE — ASSESSMENT & PLAN NOTE
Pt notes 20 year history of sarcoidosis  -evidence of pulmonary changes typical of sarcoidosis can be seen on CT last visit  -has not followed up outpatient with pulmonology  -likely non-contributory to current disease, pulm following

## 2023-11-27 NOTE — ASSESSMENT & PLAN NOTE
New loculated pleural effusion found on CT at time of admission  -recently d/c 11/14 after being managed for abdominal pain and respiratory distress, current presentation patient is overall asymptomatic from respiratory complaints  -Pt reports having smaller effusion of left lobe about 2 years ago with thoracentesis, and she was similarly not having respiratory problems  -pt notes she has been hospitalizes 4 times since July of this year, having on/off respiratory and GI problems        -s/p thoracentesis of R lobe 11/20 with 400ml exudative neutrophil-predominant fluid  -- likely due to aspiration event in setting of vomitting  -s/p chest tube placement 11/25 - drained about 400mL total, and 25mL over past 24 hour.    -CT 11/26 showed development of right hydropneumothorax.  -chest tube placed to suction this morning (unknown how long it was off), will plan to pull this afternoon if output remains minimal after   pulmonologist visits her  -pt endorses some increasing dyspnea - O2 sat stable, respiratory rate stable, could be due to pain of chest tube  -Will need to repeat imaging in 6-8 weeks for resolution of effusion      -Referral to op pulmonology on d/c - sarcoidosis for 20 years, RA, now having on and off lung problems requiring hospitalizations - consider workup for underlying cause of recent pulmonary issues causing hospitalization

## 2023-11-27 NOTE — ASSESSMENT & PLAN NOTE
Chronic history, likely non-contributory to pulmonary exudate due to high neutrophils and glucose   -methotrexate use for a few years but quit a year ago, does not fit timeline of when respiratory problems developed   -continue prednisone and pregabalin

## 2023-11-28 PROBLEM — R26.2 AMBULATORY DYSFUNCTION: Status: ACTIVE | Noted: 2023-11-28

## 2023-11-28 PROCEDURE — 97162 PT EVAL MOD COMPLEX 30 MIN: CPT

## 2023-11-28 PROCEDURE — 99232 SBSQ HOSP IP/OBS MODERATE 35: CPT | Performed by: INTERNAL MEDICINE

## 2023-11-28 PROCEDURE — 97166 OT EVAL MOD COMPLEX 45 MIN: CPT

## 2023-11-28 RX ADMIN — PREGABALIN 150 MG: 75 CAPSULE ORAL at 10:18

## 2023-11-28 RX ADMIN — HEPARIN SODIUM 5000 UNITS: 5000 INJECTION INTRAVENOUS; SUBCUTANEOUS at 21:36

## 2023-11-28 RX ADMIN — Medication 2.5 MG: at 17:37

## 2023-11-28 RX ADMIN — HEPARIN SODIUM 5000 UNITS: 5000 INJECTION INTRAVENOUS; SUBCUTANEOUS at 14:41

## 2023-11-28 RX ADMIN — Medication 2.5 MG: at 23:26

## 2023-11-28 RX ADMIN — PREGABALIN 150 MG: 75 CAPSULE ORAL at 17:35

## 2023-11-28 RX ADMIN — PREDNISONE 5 MG: 5 TABLET ORAL at 10:18

## 2023-11-28 RX ADMIN — PREDNISONE 5 MG: 5 TABLET ORAL at 17:35

## 2023-11-28 RX ADMIN — HEPARIN SODIUM 5000 UNITS: 5000 INJECTION INTRAVENOUS; SUBCUTANEOUS at 05:26

## 2023-11-28 NOTE — PROGRESS NOTES
[address]  Progress Note  Name: Chela Castillo  MRN: [id number]  Unit/Bed#: -01 I Date of Admission: 11/17/2023   Date of Service: 11/28/2023 I Hospital Day: 10    Assessment/Plan   * Pleural effusion  Assessment & Plan  Incidental finding, currently asymptomatic oxygen requirements  Pulmonology and IR on board  Status postthoracentesis of chest tube placement on 11/24  Chest tube was removed on 11/27  Stable for discharge from medical standpoint, will have repeat CT scan in 6 to 8 weeks and outpatient follow-up with pulmonology    Ambulatory dysfunction  Assessment & Plan  PT/OT recommending rehab  Currently awaiting placement  Follow-up with case management    Sarcoidosis  Assessment & Plan  Continue outpatient follow-up    Moderate protein-calorie malnutrition ([address])  Assessment & Plan  Continue nutrition evaluation outpatient    Rheumatoid arthritis ([address])  Assessment & Plan  Continue Lyrica and prednisone    Colitis-resolved as of 11/20/2023  Assessment & Plan  CT scan revealed segmental colitis  Currently asymptomatic  No diarrhea  Monitor off antibiotics         VTE Pharmacologic Prophylaxis:   Pharmacologic: Heparin  Mechanical VTE Prophylaxis in Place: Yes    Patient Centered Rounds: I have performed bedside rounds with nursing staff today. Discussions with Specialists or Other Care Team Provider: cm, nursing    Education and Discussions with Family / Patient: pt, declined family update    Time Spent for Care: 30 minutes. More than 50% of total time spent on counseling and coordination of care as described above.     Current Length of Stay: 10 day(s)    Current Patient Status: Inpatient   Certification Statement: The patient will continue to require additional inpatient hospital stay due to see below    Discharge Plan: Medically clear for discharge awaiting placement to rehab    Code Status: Level 1 - Full Code      Subjective:   Currently denies shortness of breath, fevers, chills, chest pain    Objective:     Vitals:   Temp (24hrs), Av.6 °F (36.4 °C), Min:97.4 °F (36.3 °C), Max:97.9 °F (36.6 °C)    Temp:  [97.4 °F (36.3 °C)-97.9 °F (36.6 °C)] 97.6 °F (36.4 °C)  HR:  [69-99] 69  BP: ()/(55-65) 111/65  SpO2:  [91 %-93 %] 93 %  Body mass index is 17.11 kg/m². Input and Output Summary (last 24 hours): Intake/Output Summary (Last 24 hours) at 2023 1205  Last data filed at 2023 6160  Gross per 24 hour   Intake 710 ml   Output 1300 ml   Net -590 ml       Physical Exam:     Physical Exam  Constitutional:       General: She is not in acute distress. Appearance: She is well-developed. She is not diaphoretic. HENT:      Head: Normocephalic and atraumatic. Nose: Nose normal.      Mouth/Throat:      Pharynx: No oropharyngeal exudate. Eyes:      General: No scleral icterus. Right eye: No discharge. Left eye: No discharge. Conjunctiva/sclera: Conjunctivae normal.   Neck:      Thyroid: No thyromegaly. Vascular: No JVD. Cardiovascular:      Rate and Rhythm: Normal rate and regular rhythm. Heart sounds: Normal heart sounds. No murmur heard. No friction rub. No gallop. Pulmonary:      Effort: Pulmonary effort is normal. No respiratory distress. Breath sounds: Normal breath sounds. No wheezing or rales. Chest:      Chest wall: No tenderness. Abdominal:      General: Bowel sounds are normal. There is no distension. Palpations: Abdomen is soft. Tenderness: There is no abdominal tenderness. There is no guarding or rebound. Musculoskeletal:         General: No tenderness or deformity. Normal range of motion. Cervical back: Normal range of motion and neck supple. Skin:     General: Skin is warm and dry. Findings: No erythema or rash. Neurological:      Mental Status: She is alert. Mental status is at baseline. Cranial Nerves: No cranial nerve deficit. Sensory: No sensory deficit. Motor: No abnormal muscle tone. Coordination: Coordination normal.       (    Additional Data:     Labs:    Results from last 7 days   Lab Units 11/27/23  0513 11/26/23  0534 11/24/23  0530   WBC Thousand/uL 7.09   < > 13.59*   HEMOGLOBIN g/dL 12.3   < > 11.8   HEMATOCRIT % 40.0   < > 38.1   PLATELETS Thousands/uL 185   < > 301   NEUTROS PCT %  --   --  82*   LYMPHS PCT %  --   --  10*   MONOS PCT %  --   --  6   EOS PCT %  --   --  2    < > = values in this interval not displayed. Results from last 7 days   Lab Units 11/27/23  0513   SODIUM mmol/L 137   POTASSIUM mmol/L 5.1   CHLORIDE mmol/L 100   CO2 mmol/L 32   BUN mg/dL 25   CREATININE mg/dL 0.62   ANION GAP mmol/L 5   CALCIUM mg/dL 9.2   GLUCOSE RANDOM mg/dL 104                           * I Have Reviewed All Lab Data Listed Above. * Additional Pertinent Lab Tests Reviewed: All Labs Within Last 24 Hours Reviewed    Imaging:    Imaging Reports Reviewed Today Include: na  Imaging Personally Reviewed by Myself Includes:  na    Recent Cultures (last 7 days):           Last 24 Hours Medication List:   Current Facility-Administered Medications   Medication Dose Route Frequency Provider Last Rate    acetaminophen  650 mg Oral Q6H PRN Joshua Aburto MD      heparin (porcine)  5,000 Units Subcutaneous Q8H Patti Benitez MD      nicotine  1 patch Transdermal Daily Joshua Aburto MD      ondansetron  4 mg Intravenous Q6H PRN Joshua Aburto MD      oxyCODONE  2.5 mg Oral Q6H PRN KACY Bernard      predniSONE  5 mg Oral BID With Meals Joshua Aburto MD      pregabalin  150 mg Oral BID Cordelia Mcgrath MD          Today, Patient Was Seen By: Cordelia Mcgrath MD    ** Please Note: Dictation voice to text software may have been used in the creation of this document.  **

## 2023-11-28 NOTE — PLAN OF CARE
Problem: OCCUPATIONAL THERAPY ADULT  Goal: Performs self-care activities at highest level of function for planned discharge setting. See evaluation for individualized goals. Description: Treatment Interventions: ADL retraining, Functional transfer training, UE strengthening/ROM, Endurance training, Cognitive reorientation, Patient/family training, Equipment evaluation/education, Fine motor coordination activities, Compensatory technique education, Continued evaluation          See flowsheet documentation for full assessment, interventions and recommendations. Note: Limitation: Decreased ADL status, Decreased UE strength, Decreased UE ROM, Decreased Safe judgement during ADL, Decreased cognition, Decreased endurance, Decreased fine motor control, Decreased self-care trans, Decreased high-level ADLs  Prognosis: Good  Assessment: Patient is a 76 y.o. female seen for OT evaluation s/p admit to [address] [place] [address] on 11/17/2023 w/Pleural effusion. Commorbidities affecting patient's functional performance at time of assessment include: rheumatoid arthritis, moderate protein calorie malnutrition, and sarcoidosis. Orders placed for OT evaluation and treatment. Performed at least two patient identifiers during session including name and wristband. Prior to admission, Patient reported independent with ADLs/ IADLs, lives with friends in a 2 story house, Pt ambulates with a cane, 4 ELLY; 4 steps up/down. Personal factors affecting patient at time of initial evaluation include: limited insight into deficits, decreased initiation and engagement, difficulty performing ADLs, and difficulty performing IADLs. Upon evaluation, patient requires minimal  assist for UB ADLs, moderate assist for LB ADLs, transfers and functional ambulation in room and bathroom with contact guard assist, with the use of Rolling Walker.  Therapist completed the Short Blessed Cognitive test- patient scored 9/28 (5-9) indicative of questionable impairment (evaluate for early dementing disorder.)   Occupational performance is affected by the following deficits: decreased functional use of BUEs, limited active ROM, decreased muscle strength, degenerative arthritic joint changes, impaired gross motor coordination, impaired fine motor coordination, dynamic sit/ stand balance deficit with poor standing tolerance time for self care and functional mobility, decreased activity tolerance, impaired problem solving, and decreased safety awareness. Patient to benefit from continued Occupational Therapy treatment while in the hospital to address deficits as defined above and maximize level of functional independence with ADLs and functional mobility. Occupational Performance areas to address include: bathing/ shower, dressing, toilet hygiene, transfer to all surfaces, functional mobility, health maintenance, IADLs: safety procedures, and Leisure Participation. From OT standpoint, recommendation at time of d/c would be Level 2- STR vs Home therapy services.      Rehab Resource Intensity Level, OT: II (Moderate Resource Intensity) (STR vs Home Care services)

## 2023-11-28 NOTE — CASE MANAGEMENT
Case Management Progress Note    Patient name Ale Minium  Location /-01 MRN [id number]  : 1955 Date 2023       LOS (days): 10  Geometric Mean LOS (GMLOS) (days): 3.10  Days to GMLOS:-7.1        OBJECTIVE:        Current admission status: Inpatient  Preferred Pharmacy:   [id number] - [address] [facility], [address]  Phone: [phone] Fax: [phone]    Primary Care Provider: No primary care provider on file. Primary Insurance: [employer] [facility] REP  Secondary Insurance:     PROGRESS NOTE:    Silvana Chase to discuss status of auth and letter from [employer] regarding payment for patient while [address] is pending. LVM to call back.

## 2023-11-28 NOTE — OCCUPATIONAL THERAPY NOTE
Occupational Therapy Evaluation        Patient Name: Mamta Richards  BFAPJ'H Date: 11/28/2023 11/28/23 0859   OT Last Visit   OT Visit Date 11/28/23   Note Type   Note type Evaluation   Pain Assessment   Pain Assessment Tool 0-10   Pain Score 8   Pain Location/Orientation Orientation: Right;Location: Chest   Pain Onset/Description Onset: Ongoing   Hospital Pain Intervention(s) Ambulation/increased activity;Repositioned   Restrictions/Precautions   Weight Bearing Precautions Per Order No   Braces or Orthoses Other (Comment)  (none per patient)   Other Precautions Bed Alarm; Chair Alarm; Fall Risk;Pain;Limb alert   Home Living   Type of Home House  (bi level)   Home Layout Two level; Able to live on main level with bedroom/bathroom; Performs ADLs on one level;Stairs to enter with rails  (4 ELLY; 4 steps up/down)   Bathroom Shower/Tub Tub/shower unit   Bathroom Toilet Standard   Bathroom Equipment Other (Comment)  (none per patient)   [address]   Additional Comments Pt ambulates with a cane. Prior Function   Level of [place] Independent with functional mobility; Independent with ADLs; Needs assistance with IADLS   Lives With Friend(s)   Receives Help From Friend(s)   IADLs Family/Friend/Other provides meals; Family/Friend/Other provides transportation; Family/Friend/Other provides medication management   Falls in the last 6 months 0   Vocational Retired   Lifestyle   Autonomy Patient reported independent with ADLs/ IADLs, lives with friends in a 2 story house, Pt ambulates with a cane, 4 ELLY; 4 steps up/down.    Reciprocal Relationships Supportive Family   Service to Others Retired   General   Family/Caregiver Present No   ADL   Eating Assistance 6  Modified independent   [address] 3  Moderate Assistance   [address] Assistance 3  Moderate Assistance   Toileting Assistance  4  Minimal Assistance   Functional Assistance 4  Minimal Assistance   Bed Mobility   Additional Comments Pt was received seated at EOB in NAD. Transfers   Sit to Stand   (contact guard assist)   Additional items Assist x 1; Increased time required;Verbal cues   Stand to Sit   (contact guard assist)   Additional items Assist x 1; Armrests; Increased time required;Verbal cues   Functional Mobility   Additional Comments contact guard assist   Additional items Rolling walker   Balance   Static Sitting Fair +   Dynamic Sitting Fair   Static Standing Fair   Dynamic Standing Fair -   Activity Tolerance   Activity Tolerance Patient tolerated treatment well   Medical Staff Made Aware Co-evaluation performed with PT secondary to complex medical condition of patient and regression of functional status from baseline. RUE Assessment   RUE Assessment X  (limited overhead movements)   RUE Overall AROM   R Mass Grasp bilateral ulnar deviation deformity consistent with RA ulnar deviation of MCP, right hand  is grossly functional with ability to handle utensils and tooth brush on her own. RUE Strength   RUE Overall Strength Deficits  (3/5)   LUE Assessment   LUE Assessment X  (limited overhead movements)   LUE Overall PROM   L Mass Grasp bilateral ulnar deviation deformity consistent with RA ulnar deviation of MCP, right hand  is grossly functional with ability to handle utensils and tooth brush on her own. LUE Strength   LUE Overall Strength Deficits  (3/5)   Hand Function   Gross Motor Coordination Impaired   Fine Motor Coordination Impaired   Sensation   Light Touch No apparent deficits  (BUEs)   Proprioception   Proprioception No apparent deficits   Vision-Basic Assessment   Current Vision Does not wear glasses   Cognition   Overall Cognitive Status Impaired   Arousal/Participation Alert; Responsive; Cooperative   Attention Within functional limits   Orientation Level Oriented X4   Memory Decreased short term memory;Decreased recall of recent events   Following Commands Follows one step commands without difficulty   Comments Therapist completed the Short Blessed Cognitive test- patient scored 9/28 (5-9) indicative of questionable impairment (evaluate for early dementing disorder. Cognition Assessment Tools   (Short Blessed Cognitive test)   Score 9  (see above summary)   Assessment   Limitation Decreased ADL status; Decreased UE strength;Decreased UE ROM; Decreased Safe judgement during ADL;Decreased cognition;Decreased endurance;Decreased fine motor control;Decreased self-care trans;Decreased high-level ADLs   Prognosis Good   Assessment Patient is a 76 y.o. female seen for OT evaluation s/p admit to [facility] on 11/17/2023 w/Pleural effusion. Commorbidities affecting patient's functional performance at time of assessment include: rheumatoid arthritis, moderate protein calorie malnutrition, and sarcoidosis. Orders placed for OT evaluation and treatment. Performed at least two patient identifiers during session including name and wristband. Prior to admission, Patient reported independent with ADLs/ IADLs, lives with friends in a 2 story house, Pt ambulates with a cane, 4 ELLY; 4 steps up/down. Personal factors affecting patient at time of initial evaluation include: limited insight into deficits, decreased initiation and engagement, difficulty performing ADLs, and difficulty performing IADLs. Upon evaluation, patient requires minimal  assist for UB ADLs, moderate assist for LB ADLs, transfers and functional ambulation in room and bathroom with contact guard assist, with the use of Rolling Walker.  Therapist completed the Short Blessed Cognitive test- patient scored 9/28 (5-9) indicative of questionable impairment (evaluate for early dementing disorder.)   Occupational performance is affected by the following deficits: decreased functional use of BUEs, limited active ROM, decreased muscle strength, degenerative arthritic joint changes, impaired gross motor coordination, impaired fine motor coordination, dynamic sit/ stand balance deficit with poor standing tolerance time for self care and functional mobility, decreased activity tolerance, impaired problem solving, and decreased safety awareness. Patient to benefit from continued Occupational Therapy treatment while in the hospital to address deficits as defined above and maximize level of functional independence with ADLs and functional mobility. Occupational Performance areas to address include: bathing/ shower, dressing, toilet hygiene, transfer to all surfaces, functional mobility, health maintenance, IADLs: safety procedures, and Leisure Participation. From OT standpoint, recommendation at time of d/c would be Level 2- STR vs Home therapy services. Plan   Treatment Interventions ADL retraining;Functional transfer training;UE strengthening/ROM; Endurance training;Cognitive reorientation;Patient/family training;Equipment evaluation/education; Fine motor coordination activities; Compensatory technique education;Continued evaluation   Goal Expiration Date 12/12/23   OT Frequency 3-5x/wk   Discharge Recommendation   Rehab Resource Intensity Level, OT II (Moderate Resource Intensity)  (STR vs Home Care services)   AM-PAC Daily Activity Inpatient   Lower Body Dressing 2   Bathing 2   Toileting 3   Upper Body Dressing 3   Grooming 4   Eating 4   Daily Activity Raw Score 18   Daily Activity Standardized Score (Calc for Raw Score >=11) 38.66   AM-PAC Applied Cognition Inpatient   Following a Speech/Presentation 4   Understanding Ordinary Conversation 4   Taking Medications 2   Remembering Where Things Are Placed or Put Away 3   Remembering List of 4-5 Errands 3   Taking Care of Complicated Tasks 2   Applied Cognition Raw Score 18   Applied Cognition Standardized Score 38.07   Barthel Index   Feeding 10   Bathing 0 Grooming Score 0   Dressing Score 5   Bladder Score 10   Bowels Score 10   Toilet Use Score 5   Transfers (Bed/Chair) Score 10   Mobility (Level Surface) Score 0   Stairs Score 5   Barthel Index Score 55       1 - Patient will verbalize and demonstrate use of energy conservation/ deep breathing technique and work simplification skills during functional activity with no verbal cues. 2 - Patient will verbalize and demonstrate good body mechanics and joint protection techniques during  ADLs/ IADLs with no verbal cues. 3 - Patient will increase OOB/ sitting tolerance to 2-4 hours per day for increased participation in self care and leisure tasks with no s/s of exertion. 4 - Patient will increase standing tolerance time to 5  minutes with unilateral UE support to complete sink level ADLs@ mod I level. 5 - Patient will increase sitting tolerance at edge of bed to 20 minutes to complete UB ADLs @ set up assist level. 6 - Patient will transfer bed to Chair / toilet at Set up assist level with AD as indicated. 7 - Patient will complete UB ADLs with set up assist.     8 - Patient will complete LB ADLs with min assist with the use of adaptive equipment.      9 - Patient will complete toileting hygiene with set up assist/ supervision for thoroughness    10 - Patient/ Family  will demonstrate competency with UE Home Exercise Program.

## 2023-11-28 NOTE — CASE MANAGEMENT
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 Primary Insurance: [employer] [facility] REP  Secondary Insurance:     DISCHARGE DETAILS:                                                                                        IMM Given (Date):: 23  IMM Given to[de-identified] Patient     Additional Comments: reviewed IMM and medicare rights with patient at the bedside. Verbalized understanding, signed original which was filed to medical records. Copy given to patient.

## 2023-11-28 NOTE — PROGRESS NOTES
Spiritual Care Progress Note    2023  Patient: Avery Coyle : 1955  Admission Date & Time: 2023 1414  MRN: [id number] CSN: [id number]    Eucharistic  visited patient, provided communion.      23 1600   Clinical Encounter Type   Visited With Patient   Routine Visit Introduction   Referral From Other (Comment)  ([address] [facility])   [address] [facility] Yes

## 2023-11-28 NOTE — PHYSICAL THERAPY NOTE
Physical Therapy Evaluation     Patient's Name: Lorena Bai    Admitting Diagnosis  Abdominal pain [R10.9]  Pleural effusion [J90]  Generalized abdominal pain [R10.84]  Pleural effusion on right [J90]    Problem List  Patient Active Problem List   Diagnosis    Abnormal CT scan    Adrenal nodule (HCC)    Respiratory insufficiency    Rheumatoid arthritis ([address])    Moderate protein-calorie malnutrition ([address])    Pleural effusion    Sarcoidosis     Past Medical History  Past Medical History:   Diagnosis Date    Colitis 11/12/2023    Lethargy 11/19/2023    Rheumatoid arthritis [facility])      Past Surgical History  Past Surgical History:   Procedure Laterality Date    IR CHEST TUBE PLACEMENT  11/24/2023    IR THORACENTESIS  11/20/2023 11/28/23 0833   PT Last Visit   PT Visit Date 11/28/23   Note Type   Note type Evaluation   Pain Assessment   Pain Assessment Tool 0-10   Pain Score 8   Pain Location/Orientation Orientation: Right;Location: Chest   Pain Onset/Description Onset: Ongoing   Hospital Pain Intervention(s) Ambulation/increased activity;Repositioned   Restrictions/Precautions   Weight Bearing Precautions Per Order No   Braces or Orthoses Other (Comment)  (none per patient)   Other Precautions Bed Alarm; Chair Alarm; Fall Risk;Pain;Limb alert   Home Living   Type of Home House  (bi-level)   Home Layout Two level; Able to live on main level with bedroom/bathroom; Performs ADLs on one level;Stairs to enter with rails  (4 ELLY; 4 steps up/down)   Bathroom Shower/Tub Tub/shower unit   Bathroom Toilet Standard   Bathroom Equipment Other (Comment)  (none per patient)   [address]   Additional Comments Pt ambulates with a cane. Prior Function   Level of [place] Independent with functional mobility; Independent with ADLs; Needs assistance with IADLS   Lives With Friend(s)   Receives Help From Friend(s)   IADLs Family/Friend/Other provides meals; Family/Friend/Other provides transportation; Family/Friend/Other provides medication management   Falls in the last 6 months 0   Vocational Retired   General   Family/Caregiver Present No   Cognition   Overall Cognitive Status Impaired   Arousal/Participation Alert   Orientation Level Oriented X4   Memory Decreased short term memory   Following Commands Follows multistep commands with increased time or repetition   Comments Pt agreeable to PT. Subjective   Subjective "I haven't been walking too much."   RLE Assessment   RLE Assessment X   Strength RLE   RLE Overall Strength 4-/5   LLE Assessment   LLE Assessment X   Strength LLE   LLE Overall Strength 4-/5   Light Touch   RLE Light Touch Grossly intact   LLE Light Touch Grossly intact   Bed Mobility   Additional Comments Pt was received seated at EOB in NAD. Transfers   Sit to Stand   (CG assist)   Additional items Assist x 1; Increased time required;Verbal cues   Stand to Sit   (CG assist)   Additional items Assist x 1; Armrests; Increased time required;Verbal cues   Ambulation/Elevation   Gait pattern Short stride; Shuffling   Gait Assistance   (CG assist)   Additional items Assist x 1;Verbal cues   Assistive Device Rolling walker   Distance 80 feet   Stair Management Assistance   (CG assist)   Additional items Assist x 1;Verbal cues   Stair Management Technique Two rails; Alternating pattern; Foreward   Number of Stairs 7  (4, 4 inch; 3, 6 inch; x2 trials)   Balance   Static Sitting Fair +   Dynamic Sitting Fair   Static Standing Fair   Dynamic Standing Fair -   Ambulatory Fair -   Endurance Deficit   Endurance Deficit Yes   Endurance Deficit Description decreased activity tolerance   Activity Tolerance   Activity Tolerance Patient tolerated treatment well   Medical Staff Made Aware JOSH Roblero  (Co-evaluation performed with OT secondary to complex medical condition of patient and regression of functional status from baseline.  PT/OT goals were addressed separately.)   Nurse Made Aware DANA Marina Assessment   Prognosis Good   Problem List Decreased strength;Decreased endurance; Impaired balance;Decreased mobility; Decreased cognition; Impaired judgement;Decreased safety awareness;Pain   Assessment Pt is 76year old female seen for PT evaluation s/p admit to [address] [facility] on 11/17/2023 with Pleural effusion. PT consulted to assess pt's functional mobility and discharge needs. Order placed for PT evaluation and treatment, with up and out of bed as tolerated order. Comorbidities affecting pt's physical performance at time of assessment include rheumatoid arthritis, moderate protein calorie malnutrition, and sarcoidosis. Prior to hospitalization, pt was independent with all functional mobility with a cane. Pt ambulates household and community distances. Pt resides with a friend in a bi-level house with four steps to enter and four steps up/down. Personal factors affecting pt at time of initial evaluation include lives in a bi-level house, stairs to enter home, ambulating with an assistive device, inability to ambulate community distances, inability to navigate level surfaces without external assistance, unable to perform dynamic tasks in the community, limited home support, difficulty performing ADLs, and inability to perform IADLs. Please find objective findings from PT assessment regarding body systems outlined above with impairments and limitations including weakness, impaired balance, decreased endurance, gait deviations, pain, decreased activity tolerance, decreased functional mobility tolerance, impaired cognition, impaired judgement, decreased safety awareness, and fall risk. The following objective measures were performed on initial evaluation Barthel Index: 55/100, Modified [place]: 4 (moderate/severe disability), and AM-PAC 6-Clicks: 03/91.  Pt's clinical presentation is currently evolving seen in pt's presentation of need for ongoing medical management/monitoring, pt is a fall risk, pt requires use of RW for safe ambulation, and pt requires cues and assist for safety with functional mobility. Pt to benefit from continued PT treatment to address deficits as defined above and maximize pt's level of function and independence with mobility. From a PT standpoint, recommendation at time of discharge would be level 2, moderate resource intensity, STR vs. HHPT pending progress, in order to facilitate return to prior level of function. Barriers to Discharge Inaccessible home environment;Decreased caregiver support   Goals   STG Expiration Date 12/08/23   Short Term Goal #1 In 10 days: Increase bilateral LE strength 1/2 grade to facilitate independent mobility, Perform all bed mobility tasks modified independent to decrease caregiver burden, Perform all transfers modified independent to improve independence, Ambulate > 150 ft. with least restrictive assistive device modified independent w/o LOB and w/ normalized gait pattern 100% of the time, Navigate 4 stairs modified independent with unilateral handrail to facilitate return to previous living environment, and Increase all balance 1/2 grade to decrease risk for falls   Plan   Treatment/Interventions Functional transfer training;LE strengthening/ROM; Elevations; Therapeutic exercise; Endurance training;Patient/family training;Bed mobility;Gait training;Spoke to nursing;Spoke to case management;OT   PT Frequency 3-5x/wk   Discharge Recommendation   Rehab Resource Intensity Level, PT II (Moderate Resource Intensity)  (STR vs. HHPT pending progress)   AM-PAC Basic Mobility Inpatient   Turning in Flat Bed Without Bedrails 3   Lying on Back to Sitting on Edge of Flat Bed Without Bedrails 3   Moving Bed to Chair 3   Standing Up From Chair Using Arms 3   Walk in Room 3   Climb 3-5 Stairs With Railing 3   Basic Mobility Inpatient Raw Score 18   Basic Mobility Standardized Score 41.05   Highest Level Of Mobility   JH-HLM Goal 6: Walk 10 steps or more   JH-HLM Achieved 7: Walk 25 feet or more   Modified [place] Scale   Modified [place] Scale 4   Barthel Index   Feeding 10   Bathing 0   Grooming Score 0   Dressing Score 5   Bladder Score 10   Bowels Score 10   Toilet Use Score 5   Transfers (Bed/Chair) Score 10   Mobility (Level Surface) Score 0   Stairs Score 5   Barthel Index Score 55     PT Evaluation Time: 2968-2001  Pooja Rebollar, PT, DPT

## 2023-11-28 NOTE — PLAN OF CARE
Problem: PHYSICAL THERAPY ADULT  Goal: Performs mobility at highest level of function for planned discharge setting. See evaluation for individualized goals. Description: Treatment/Interventions: Functional transfer training, LE strengthening/ROM, Elevations, Therapeutic exercise, Endurance training, Patient/family training, Bed mobility, Gait training, Spoke to nursing, Spoke to case management, OT          See flowsheet documentation for full assessment, interventions and recommendations. Note: Prognosis: Good  Problem List: Decreased strength, Decreased endurance, Impaired balance, Decreased mobility, Decreased cognition, Impaired judgement, Decreased safety awareness, Pain  Assessment: Pt is 76year old female seen for PT evaluation s/p admit to [address] [facility] on 11/17/2023 with Pleural effusion. PT consulted to assess pt's functional mobility and discharge needs. Order placed for PT evaluation and treatment, with up and out of bed as tolerated order. Comorbidities affecting pt's physical performance at time of assessment include rheumatoid arthritis, moderate protein calorie malnutrition, and sarcoidosis. Prior to hospitalization, pt was independent with all functional mobility with a cane. Pt ambulates household and community distances. Pt resides with a friend in a bi-level house with four steps to enter and four steps up/down. Personal factors affecting pt at time of initial evaluation include lives in a bi-level house, stairs to enter home, ambulating with an assistive device, inability to ambulate community distances, inability to navigate level surfaces without external assistance, unable to perform dynamic tasks in the community, limited home support, difficulty performing ADLs, and inability to perform IADLs.  Please find objective findings from PT assessment regarding body systems outlined above with impairments and limitations including weakness, impaired balance, decreased endurance, gait deviations, pain, decreased activity tolerance, decreased functional mobility tolerance, and fall risk. The following objective measures were performed on initial evaluation Barthel Index: 55/100, Modified [place]: 4 (moderate/severe disability), and AM-PAC 6-Clicks: 95/85. Pt's clinical presentation is currently evolving seen in pt's presentation of need for ongoing medical management/monitoring, pt is a fall risk, pt requires use of RW for safe ambulation, and pt requires cues and assist for safety with functional mobility. Pt to benefit from continued PT treatment to address deficits as defined above and maximize pt's level of function and independence with mobility. From a PT standpoint, recommendation at time of discharge would be level 2, moderate resource intensity, STR vs. HHPT pending progress, in order to facilitate return to prior level of function. Barriers to Discharge: Inaccessible home environment, Decreased caregiver support     Rehab Resource Intensity Level, PT: II (Moderate Resource Intensity) (STR vs. HHPT pending progress)    See flowsheet documentation for full assessment.

## 2023-11-28 NOTE — PROGRESS NOTES
Progress Note - Pulmonary   Tad Patience 76 y.o. female MRN: [id number]  Unit/Bed#: -01 Encounter: [id number]    Assessment:  Exudative right-sided pleural effusion  Rheumatoid arthritis and sarcoidosis  Abnormal chest CT    Plan:  Patient remains stable on room air  Chest tube was removed yesterday, patient overall feeling better though still with some pleuritic discomfort  Will need repeat chest CT in 6 to 8 weeks  She is stable for discharge from pulmonary standpoint, can follow-up with us in the office in 1 to 2 weeks    Subjective:   Patient resting in bed. She is overall feeling better but still with some discomfort when she moves or takes a deep breath. Objective:     Vitals: Blood pressure 111/65, pulse 69, temperature 97.6 °F (36.4 °C), temperature source Oral, resp. rate 14, height 5' 3" (1.6 m), weight 43.8 kg (96 lb 9 oz), SpO2 93 %. ,Body mass index is 17.11 kg/m². Intake/Output Summary (Last 24 hours) at 11/28/2023 1102  Last data filed at 11/28/2023 9495  Gross per 24 hour   Intake 710 ml   Output 1300 ml   Net -590 ml       Invasive Devices       Peripheral Intravenous Line  Duration             Long-Dwell Peripheral IV (Midline) 58/32/83 Right Basilic 7 days              Drain  Duration             Chest Tube 1 Right Pleural;Posterior 10.2 Fr. 4 days                    Physical Exam: /65   Pulse 69   Temp 97.6 °F (36.4 °C) (Oral)   Resp 14   Ht 5' 3" (1.6 m)   Wt 43.8 kg (96 lb 9 oz)   SpO2 93%   BMI 17.11 kg/m²   General appearance: alert and oriented, in no acute distress  Head: Normocephalic, without obvious abnormality, atraumatic  Eyes: negative findings: conjunctivae and sclerae normal  Lungs:  diminished right base  Heart: regular rate and rhythm  Abdomen: normal findings: soft, non-tender  Extremities:  no edema  Skin:  warm and dry  Neurologic: Mental status: Alert, oriented, thought content appropriate     Labs: I have personally reviewed pertinent lab results. , CBC: No results found for: "WBC", "HGB", "HCT", "MCV", "PLT", "ADJUSTEDWBC", "RBC", "MCH", "MCHC", "RDW", "MPV", "NRBC", CMP: No results found for: "SODIUM", "K", "CL", "CO2", "ANIONGAP", "BUN", "CREATININE", "GLUCOSE", "CALCIUM", "AST", "ALT", "ALKPHOS", "PROT", "BILITOT", "EGFR"  Imaging and other studies: I have personally reviewed pertinent reports.    and I have personally reviewed pertinent films in PACS

## 2023-11-29 LAB
ANION GAP SERPL CALCULATED.3IONS-SCNC: 5 MMOL/L
BASOPHILS # BLD AUTO: 0.03 THOUSANDS/ÂΜL (ref 0–0.1)
BASOPHILS NFR BLD AUTO: 0 % (ref 0–1)
BUN SERPL-MCNC: 23 MG/DL (ref 5–25)
CALCIUM SERPL-MCNC: 9.4 MG/DL (ref 8.4–10.2)
CHLORIDE SERPL-SCNC: 100 MMOL/L (ref 96–108)
CO2 SERPL-SCNC: 34 MMOL/L (ref 21–32)
CREAT SERPL-MCNC: 0.62 MG/DL (ref 0.6–1.3)
EOSINOPHIL # BLD AUTO: 0.24 THOUSAND/ÂΜL (ref 0–0.61)
EOSINOPHIL NFR BLD AUTO: 3 % (ref 0–6)
ERYTHROCYTE [DISTWIDTH] IN BLOOD BY AUTOMATED COUNT: 15.4 % (ref 11.6–15.1)
GFR SERPL CREATININE-BSD FRML MDRD: 92 ML/MIN/1.73SQ M
GLUCOSE SERPL-MCNC: 149 MG/DL (ref 65–140)
HCT VFR BLD AUTO: 35.6 % (ref 34.8–46.1)
HGB BLD-MCNC: 11 G/DL (ref 11.5–15.4)
IMM GRANULOCYTES # BLD AUTO: 0.02 THOUSAND/UL (ref 0–0.2)
IMM GRANULOCYTES NFR BLD AUTO: 0 % (ref 0–2)
LYMPHOCYTES # BLD AUTO: 1.81 THOUSANDS/ÂΜL (ref 0.6–4.47)
LYMPHOCYTES NFR BLD AUTO: 26 % (ref 14–44)
MCH RBC QN AUTO: 30.3 PG (ref 26.8–34.3)
MCHC RBC AUTO-ENTMCNC: 30.9 G/DL (ref 31.4–37.4)
MCV RBC AUTO: 98 FL (ref 82–98)
MONOCYTES # BLD AUTO: 0.34 THOUSAND/ÂΜL (ref 0.17–1.22)
MONOCYTES NFR BLD AUTO: 5 % (ref 4–12)
NEUTROPHILS # BLD AUTO: 4.6 THOUSANDS/ÂΜL (ref 1.85–7.62)
NEUTS SEG NFR BLD AUTO: 66 % (ref 43–75)
NRBC BLD AUTO-RTO: 0 /100 WBCS
PLATELET # BLD AUTO: 265 THOUSANDS/UL (ref 149–390)
PMV BLD AUTO: 10.9 FL (ref 8.9–12.7)
POTASSIUM SERPL-SCNC: 4 MMOL/L (ref 3.5–5.3)
RBC # BLD AUTO: 3.63 MILLION/UL (ref 3.81–5.12)
SODIUM SERPL-SCNC: 139 MMOL/L (ref 135–147)
WBC # BLD AUTO: 7.04 THOUSAND/UL (ref 4.31–10.16)

## 2023-11-29 PROCEDURE — 80048 BASIC METABOLIC PNL TOTAL CA: CPT | Performed by: INTERNAL MEDICINE

## 2023-11-29 PROCEDURE — 99232 SBSQ HOSP IP/OBS MODERATE 35: CPT | Performed by: INTERNAL MEDICINE

## 2023-11-29 PROCEDURE — 85025 COMPLETE CBC W/AUTO DIFF WBC: CPT | Performed by: INTERNAL MEDICINE

## 2023-11-29 RX ADMIN — PREGABALIN 150 MG: 75 CAPSULE ORAL at 08:03

## 2023-11-29 RX ADMIN — Medication 2.5 MG: at 06:32

## 2023-11-29 RX ADMIN — HEPARIN SODIUM 5000 UNITS: 5000 INJECTION INTRAVENOUS; SUBCUTANEOUS at 06:32

## 2023-11-29 RX ADMIN — HEPARIN SODIUM 5000 UNITS: 5000 INJECTION INTRAVENOUS; SUBCUTANEOUS at 14:12

## 2023-11-29 RX ADMIN — PREGABALIN 150 MG: 75 CAPSULE ORAL at 17:56

## 2023-11-29 RX ADMIN — PREDNISONE 5 MG: 5 TABLET ORAL at 08:04

## 2023-11-29 RX ADMIN — HEPARIN SODIUM 5000 UNITS: 5000 INJECTION INTRAVENOUS; SUBCUTANEOUS at 21:06

## 2023-11-29 RX ADMIN — Medication 2.5 MG: at 21:06

## 2023-11-29 RX ADMIN — PREDNISONE 5 MG: 5 TABLET ORAL at 15:39

## 2023-11-29 RX ADMIN — Medication 2.5 MG: at 14:22

## 2023-11-29 RX ADMIN — ACETAMINOPHEN 650 MG: 325 TABLET, FILM COATED ORAL at 08:04

## 2023-11-29 NOTE — ASSESSMENT & PLAN NOTE
Incidental finding, currently asymptomatic  Status post thoracentesis and chest tube placement on 11/24  Chest tube has since been removed on 11/27  Currently asymptomatic  Stable for discharge from medical standpoint  Will have repeat CT scan in 6 to 8 weeks  Currently awaiting placement

## 2023-11-29 NOTE — PROGRESS NOTES
[address]  Progress Note  Name: Shadia Edwards  MRN: [id number]  Unit/Bed#: -01 I Date of Admission: 2023   Date of Service: 2023 I Hospital Day: 11    Assessment/Plan   * Pleural effusion  Assessment & Plan  Incidental finding, currently asymptomatic  Status post thoracentesis and chest tube placement on   Chest tube has since been removed on   Currently asymptomatic  Stable for discharge from medical standpoint  Will have repeat CT scan in 6 to 8 weeks  Currently awaiting placement    Ambulatory dysfunction  Assessment & Plan  PT/OT recommending rehab  Currently awaiting placement  Follow-up with case management    Sarcoidosis  Assessment & Plan  Continue outpatient follow-up    Moderate protein-calorie malnutrition ([address])  Assessment & Plan  Follow-up with nutrition outpatient    Rheumatoid arthritis ([address])  Assessment & Plan  Continue Lyrica and prednisone             VTE Pharmacologic Prophylaxis:   Pharmacologic: Heparin  Mechanical VTE Prophylaxis in Place: Yes    Patient Centered Rounds: I have performed bedside rounds with nursing staff today. Discussions with Specialists or Other Care Team Provider: cm, nursing    Education and Discussions with Family / Patient: pt, declined family update    Time Spent for Care: 30 minutes. More than 50% of total time spent on counseling and coordination of care as described above.     Current Length of Stay: 11 day(s)    Current Patient Status: Inpatient   Certification Statement: The patient will continue to require additional inpatient hospital stay due to see below    Discharge Plan: medically clear awaiting placement    Code Status: Level 1 - Full Code      Subjective:   Denies cp, sob, cough, fevers, chills    Objective:     Vitals:   Temp (24hrs), Av.7 °F (36.5 °C), Min:97.6 °F (36.4 °C), Max:98 °F (36.7 °C)    Temp:  [97.6 °F (36.4 °C)-98 °F (36.7 °C)] 97.6 °F (36.4 °C)  HR:  [75-87] 75  Resp:  [18] 18  BP: (100-105)/(63-70) 104/63  SpO2:  [92 %-97 %] 97 %  Body mass index is 17.11 kg/m². Input and Output Summary (last 24 hours): Intake/Output Summary (Last 24 hours) at 11/29/2023 1052  Last data filed at 11/29/2023 2388  Gross per 24 hour   Intake --   Output 1000 ml   Net -1000 ml       Physical Exam:     Physical Exam  Constitutional:       General: She is not in acute distress. Appearance: She is well-developed. She is not diaphoretic. HENT:      Head: Normocephalic and atraumatic. Nose: Nose normal.      Mouth/Throat:      Pharynx: No oropharyngeal exudate. Eyes:      General: No scleral icterus. Right eye: No discharge. Left eye: No discharge. Conjunctiva/sclera: Conjunctivae normal.   Neck:      Thyroid: No thyromegaly. Vascular: No JVD. Cardiovascular:      Rate and Rhythm: Normal rate and regular rhythm. Heart sounds: Normal heart sounds. No murmur heard. No friction rub. No gallop. Pulmonary:      Effort: Pulmonary effort is normal. No respiratory distress. Breath sounds: Normal breath sounds. No wheezing or rales. Chest:      Chest wall: No tenderness. Abdominal:      General: Bowel sounds are normal. There is no distension. Palpations: Abdomen is soft. Tenderness: There is no abdominal tenderness. There is no guarding or rebound. Musculoskeletal:         General: No tenderness or deformity. Normal range of motion. Cervical back: Normal range of motion and neck supple. Skin:     General: Skin is warm and dry. Findings: No erythema or rash. Neurological:      Mental Status: She is alert. Mental status is at baseline. Cranial Nerves: No cranial nerve deficit. Sensory: No sensory deficit. Motor: No abnormal muscle tone.       Coordination: Coordination normal.       (  Additional Data:     Labs:    Results from last 7 days   Lab Units 11/29/23  0835   WBC Thousand/uL 7.04   HEMOGLOBIN g/dL 11.0* HEMATOCRIT % 35.6   PLATELETS Thousands/uL 265   NEUTROS PCT % 66   LYMPHS PCT % 26   MONOS PCT % 5   EOS PCT % 3     Results from last 7 days   Lab Units 11/29/23  0835   SODIUM mmol/L 139   POTASSIUM mmol/L 4.0   CHLORIDE mmol/L 100   CO2 mmol/L 34*   BUN mg/dL 23   CREATININE mg/dL 0.62   ANION GAP mmol/L 5   CALCIUM mg/dL 9.4   GLUCOSE RANDOM mg/dL 149*                           * I Have Reviewed All Lab Data Listed Above. * Additional Pertinent Lab Tests Reviewed: All Labs Within Last 24 Hours Reviewed    Imaging:    Imaging Reports Reviewed Today Include: na  Imaging Personally Reviewed by Myself Includes:  na    Recent Cultures (last 7 days):           Last 24 Hours Medication List:   Current Facility-Administered Medications   Medication Dose Route Frequency Provider Last Rate    acetaminophen  650 mg Oral Q6H PRN Joshua Aburto MD      heparin (porcine)  5,000 Units Subcutaneous Q8H Santosh Rojas MD      nicotine  1 patch Transdermal Daily Joshua Aburto MD      ondansetron  4 mg Intravenous Q6H PRN Joshua Aburto MD      oxyCODONE  2.5 mg Oral Q6H PRN KACY José      predniSONE  5 mg Oral BID With Meals Joshua Aburto MD      pregabalin  150 mg Oral BID Remi Jeong MD          Today, Patient Was Seen By: Remi Jeong MD    ** Please Note: Dictation voice to text software may have been used in the creation of this document.  **

## 2023-11-29 NOTE — PLAN OF CARE
Problem: PAIN - ADULT  Goal: Verbalizes/displays adequate comfort level or baseline comfort level  Description: Interventions:  - Encourage patient to monitor pain and request assistance  - Assess pain using appropriate pain scale  - Administer analgesics based on type and severity of pain and evaluate response  - Implement non-pharmacological measures as appropriate and evaluate response  - Consider cultural and social influences on pain and pain management  - Notify physician/advanced practitioner if interventions unsuccessful or patient reports new pain  Outcome: Progressing     Problem: INFECTION - ADULT  Goal: Absence or prevention of progression during hospitalization  Description: INTERVENTIONS:  - Assess and monitor for signs and symptoms of infection  - Monitor lab/diagnostic results  - Monitor all insertion sites, i.e. indwelling lines, tubes, and drains  - Monitor endotracheal if appropriate and nasal secretions for changes in amount and color  - [place] appropriate cooling/warming therapies per order  - Administer medications as ordered  - Instruct and encourage patient and family to use good hand hygiene technique  - Identify and instruct in appropriate isolation precautions for identified infection/condition  Outcome: Progressing

## 2023-11-30 VITALS
HEIGHT: 63 IN | WEIGHT: 96.56 LBS | DIASTOLIC BLOOD PRESSURE: 64 MMHG | BODY MASS INDEX: 17.11 KG/M2 | SYSTOLIC BLOOD PRESSURE: 109 MMHG | HEART RATE: 70 BPM | OXYGEN SATURATION: 98 % | RESPIRATION RATE: 18 BRPM | TEMPERATURE: 97.5 F

## 2023-11-30 LAB
ANION GAP SERPL CALCULATED.3IONS-SCNC: 6 MMOL/L
BASOPHILS # BLD AUTO: 0.03 THOUSANDS/ÂΜL (ref 0–0.1)
BASOPHILS NFR BLD AUTO: 1 % (ref 0–1)
BUN SERPL-MCNC: 21 MG/DL (ref 5–25)
CALCIUM SERPL-MCNC: 9.4 MG/DL (ref 8.4–10.2)
CHLORIDE SERPL-SCNC: 99 MMOL/L (ref 96–108)
CO2 SERPL-SCNC: 33 MMOL/L (ref 21–32)
CREAT SERPL-MCNC: 0.59 MG/DL (ref 0.6–1.3)
EOSINOPHIL # BLD AUTO: 0.33 THOUSAND/ÂΜL (ref 0–0.61)
EOSINOPHIL NFR BLD AUTO: 5 % (ref 0–6)
ERYTHROCYTE [DISTWIDTH] IN BLOOD BY AUTOMATED COUNT: 15.5 % (ref 11.6–15.1)
GFR SERPL CREATININE-BSD FRML MDRD: 94 ML/MIN/1.73SQ M
GLUCOSE SERPL-MCNC: 150 MG/DL (ref 65–140)
HCT VFR BLD AUTO: 34.1 % (ref 34.8–46.1)
HGB BLD-MCNC: 10.6 G/DL (ref 11.5–15.4)
IMM GRANULOCYTES # BLD AUTO: 0.02 THOUSAND/UL (ref 0–0.2)
IMM GRANULOCYTES NFR BLD AUTO: 0 % (ref 0–2)
LYMPHOCYTES # BLD AUTO: 1.64 THOUSANDS/ÂΜL (ref 0.6–4.47)
LYMPHOCYTES NFR BLD AUTO: 26 % (ref 14–44)
MCH RBC QN AUTO: 30.3 PG (ref 26.8–34.3)
MCHC RBC AUTO-ENTMCNC: 31.1 G/DL (ref 31.4–37.4)
MCV RBC AUTO: 97 FL (ref 82–98)
MONOCYTES # BLD AUTO: 0.45 THOUSAND/ÂΜL (ref 0.17–1.22)
MONOCYTES NFR BLD AUTO: 7 % (ref 4–12)
NEUTROPHILS # BLD AUTO: 3.89 THOUSANDS/ÂΜL (ref 1.85–7.62)
NEUTS SEG NFR BLD AUTO: 61 % (ref 43–75)
NRBC BLD AUTO-RTO: 0 /100 WBCS
PLATELET # BLD AUTO: 256 THOUSANDS/UL (ref 149–390)
PMV BLD AUTO: 11.2 FL (ref 8.9–12.7)
POTASSIUM SERPL-SCNC: 4.1 MMOL/L (ref 3.5–5.3)
RBC # BLD AUTO: 3.5 MILLION/UL (ref 3.81–5.12)
SODIUM SERPL-SCNC: 138 MMOL/L (ref 135–147)
WBC # BLD AUTO: 6.36 THOUSAND/UL (ref 4.31–10.16)

## 2023-11-30 PROCEDURE — 99232 SBSQ HOSP IP/OBS MODERATE 35: CPT | Performed by: INTERNAL MEDICINE

## 2023-11-30 PROCEDURE — 99238 HOSP IP/OBS DSCHRG MGMT 30/<: CPT | Performed by: INTERNAL MEDICINE

## 2023-11-30 PROCEDURE — 85025 COMPLETE CBC W/AUTO DIFF WBC: CPT | Performed by: INTERNAL MEDICINE

## 2023-11-30 PROCEDURE — 80048 BASIC METABOLIC PNL TOTAL CA: CPT | Performed by: INTERNAL MEDICINE

## 2023-11-30 RX ORDER — OXYCODONE HYDROCHLORIDE 5 MG/1
2.5 TABLET ORAL EVERY 6 HOURS PRN
Qty: 30 TABLET | Refills: 0 | Status: SHIPPED | OUTPATIENT
Start: 2023-11-30 | End: 2023-12-15

## 2023-11-30 RX ORDER — PREGABALIN 75 MG/1
150 CAPSULE ORAL 2 TIMES DAILY
Qty: 120 CAPSULE | Refills: 0 | Status: SHIPPED | OUTPATIENT
Start: 2023-11-30 | End: 2023-12-30

## 2023-11-30 RX ADMIN — HEPARIN SODIUM 5000 UNITS: 5000 INJECTION INTRAVENOUS; SUBCUTANEOUS at 05:54

## 2023-11-30 RX ADMIN — PREDNISONE 5 MG: 5 TABLET ORAL at 09:01

## 2023-11-30 RX ADMIN — Medication 2.5 MG: at 05:54

## 2023-11-30 RX ADMIN — PREGABALIN 150 MG: 75 CAPSULE ORAL at 09:02

## 2023-11-30 NOTE — PROGRESS NOTES
[address]  Progress Note  Name: Juan Luis Major  MRN: [id number]  Unit/Bed#: -01 I Date of Admission: 2023   Date of Service: 2023 I Hospital Day: 12    Assessment/Plan   * Pleural effusion  Assessment & Plan  Incidental finding  Status post thoracentesis and chest tube placement on   Chest tube has since been removed on   Currently asymptomatic  Stable for discharge from medical standpoint  Will have repeat CT in 6 to 8 weeks    Ambulatory dysfunction  Assessment & Plan  PT/OT recommending rehab  Currently awaiting placement    Sarcoidosis  Assessment & Plan  Continue outpatient follow-up    Moderate protein-calorie malnutrition ([address])  Assessment & Plan  As noted by BMI of 17  Follow-up outpatient with nutrition    Rheumatoid arthritis ([address])  Assessment & Plan  Continue Lyrica and prednisone           VTE Pharmacologic Prophylaxis:   Pharmacologic: Heparin  Mechanical VTE Prophylaxis in Place: Yes    Patient Centered Rounds: I have performed bedside rounds with nursing staff today. Discussions with Specialists or Other Care Team Provider: sabas    Education and Discussions with Family / Patient: pt, declined family update    Time Spent for Care: 30 minutes. More than 50% of total time spent on counseling and coordination of care as described above. Current Length of Stay: 12 day(s)    Current Patient Status: Inpatient   Certification Statement: The patient will continue to require additional inpatient hospital stay due to see below    Discharge Plan: medically clear for dc, awaiting placement    Code Status: Level 1 - Full Code      Subjective:   Denies any acute complaints.   Denies chest pain, shortness of breath, fevers, chills    Objective:     Vitals:   Temp (24hrs), Av.4 °F (36.3 °C), Min:97.3 °F (36.3 °C), Max:97.5 °F (36.4 °C)    Temp:  [97.3 °F (36.3 °C)-97.5 °F (36.4 °C)] 97.5 °F (36.4 °C)  HR:  [70] 70  Resp:  [18] 18  BP: (105-109)/(64) 109/64  SpO2:  [97 %-98 %] 98 %  Body mass index is 17.11 kg/m². Input and Output Summary (last 24 hours): Intake/Output Summary (Last 24 hours) at 11/30/2023 1006  Last data filed at 11/30/2023 0557  Gross per 24 hour   Intake 360 ml   Output 300 ml   Net 60 ml       Physical Exam:     Physical Exam  Constitutional:       General: She is not in acute distress. Appearance: She is well-developed. She is not diaphoretic. HENT:      Head: Normocephalic and atraumatic. Nose: Nose normal.      Mouth/Throat:      Pharynx: No oropharyngeal exudate. Eyes:      General: No scleral icterus. Right eye: No discharge. Left eye: No discharge. Conjunctiva/sclera: Conjunctivae normal.   Neck:      Thyroid: No thyromegaly. Vascular: No JVD. Cardiovascular:      Rate and Rhythm: Normal rate and regular rhythm. Heart sounds: Normal heart sounds. No murmur heard. No friction rub. No gallop. Pulmonary:      Effort: Pulmonary effort is normal. No respiratory distress. Breath sounds: Normal breath sounds. No wheezing or rales. Chest:      Chest wall: No tenderness. Abdominal:      General: Bowel sounds are normal. There is no distension. Palpations: Abdomen is soft. Tenderness: There is no abdominal tenderness. There is no guarding or rebound. Musculoskeletal:         General: No tenderness or deformity. Normal range of motion. Cervical back: Normal range of motion and neck supple. Skin:     General: Skin is warm and dry. Findings: No erythema or rash. Neurological:      Mental Status: She is alert. Mental status is at baseline. Cranial Nerves: No cranial nerve deficit. Sensory: No sensory deficit. Motor: No abnormal muscle tone.       Coordination: Coordination normal.       (    Additional Data:     Labs:    Results from last 7 days   Lab Units 11/30/23  0908   WBC Thousand/uL 6.36   HEMOGLOBIN g/dL 10.6*   HEMATOCRIT % 34.1* PLATELETS Thousands/uL 256   NEUTROS PCT % 61   LYMPHS PCT % 26   MONOS PCT % 7   EOS PCT % 5     Results from last 7 days   Lab Units 11/30/23  0908   SODIUM mmol/L 138   POTASSIUM mmol/L 4.1   CHLORIDE mmol/L 99   CO2 mmol/L 33*   BUN mg/dL 21   CREATININE mg/dL 0.59*   ANION GAP mmol/L 6   CALCIUM mg/dL 9.4   GLUCOSE RANDOM mg/dL 150*                           * I Have Reviewed All Lab Data Listed Above. * Additional Pertinent Lab Tests Reviewed: All Labs Within Last 24 Hours Reviewed    Imaging:    Imaging Reports Reviewed Today Include: na  Imaging Personally Reviewed by Myself Includes:  na    Recent Cultures (last 7 days):           Last 24 Hours Medication List:   Current Facility-Administered Medications   Medication Dose Route Frequency Provider Last Rate    acetaminophen  650 mg Oral Q6H PRN Joshua Aburto MD      heparin (porcine)  5,000 Units Subcutaneous Q8H Adoins Donis MD      nicotine  1 patch Transdermal Daily Joshua Aburto MD      ondansetron  4 mg Intravenous Q6H PRN Joshua Aburto MD      oxyCODONE  2.5 mg Oral Q6H PRN KACY Pulido      predniSONE  5 mg Oral BID With Meals Joshua Aburto MD      pregabalin  150 mg Oral BID Garret Nesbitt MD          Today, Patient Was Seen By: Garret Nesbitt MD    ** Please Note: Dictation voice to text software may have been used in the creation of this document.  **

## 2023-11-30 NOTE — PLAN OF CARE
Problem: PAIN - ADULT  Goal: Verbalizes/displays adequate comfort level or baseline comfort level  Description: Interventions:  - Encourage patient to monitor pain and request assistance  - Assess pain using appropriate pain scale  - Administer analgesics based on type and severity of pain and evaluate response  - Implement non-pharmacological measures as appropriate and evaluate response  - Consider cultural and social influences on pain and pain management  - Notify physician/advanced practitioner if interventions unsuccessful or patient reports new pain  Outcome: Progressing     Problem: INFECTION - ADULT  Goal: Absence or prevention of progression during hospitalization  Description: INTERVENTIONS:  - Assess and monitor for signs and symptoms of infection  - Monitor lab/diagnostic results  - Monitor all insertion sites, i.e. indwelling lines, tubes, and drains  - Monitor endotracheal if appropriate and nasal secretions for changes in amount and color  - [place] appropriate cooling/warming therapies per order  - Administer medications as ordered  - Instruct and encourage patient and family to use good hand hygiene technique  - Identify and instruct in appropriate isolation precautions for identified infection/condition  Outcome: Progressing

## 2023-11-30 NOTE — ASSESSMENT & PLAN NOTE
Incidental finding  Status post thoracentesis and chest tube placement on 11/24  Chest tube has since been removed on 11/27  Currently asymptomatic  Stable for discharge from medical standpoint  Will have repeat CT in 6 to 8 weeks

## 2023-12-01 NOTE — UTILIZATION REVIEW
NOTIFICATION OF ADMISSION DISCHARGE   This is a Notification of Discharge from [address] [facility]. Please be advised that this patient has been discharge from our facility. Below you will find the admission and discharge date and time including the patient’s disposition. UTILIZATION REVIEW CONTACT:  T[facility] [employer]  Utilization   Network Utilization Review Department  Phone: [phone] x carefully listen to the prompts. All voicemails are confidential.  Email: [email or web address]     ADMISSION INFORMATION  PRESENTATION DATE: 11/17/2023  2:14 PM  OBERVATION ADMISSION DATE:   INPATIENT ADMISSION DATE: 11/18/23 10:12 AM   DISCHARGE DATE: 11/30/2023 12:16 PM   DISPOSITION:[facility] SNF/TCU/SNU    Network Utilization Review Department  ATTENTION: Please call with any questions or concerns to [phone] and carefully listen to the prompts so that you are directed to the right person. All voicemails are confidential.   For Discharge needs, contact Care Management DC Support Team at [phone] opt. 2  Send all requests for admission clinical reviews, approved or denied determinations and any other requests to dedicated fax number below belonging to the campus where the patient is receiving treatment.  List of dedicated fax numbers for the Facilities:  [place] DENIALS (Administrative/Medical Necessity) [phone]   DISCHARGE SUPPORT TEAM (Network) [phone] [address] [facility] (Maternity/NICU/Pediatrics) [phone] [address] [facility] [address] [facility] [address] [place] [address] [address]  [phone]

## 2023-12-06 NOTE — DISCHARGE SUMMARY
[address]  Discharge- Magaly Goldman 1955, 76 y.o. female MRN: [id number]  Unit/Bed#: -01 Encounter: [id number]  Primary Care Provider: No primary care provider on file. Date and time admitted to hospital: 11/17/2023  2:14 PM    Discharging Physician / Practitioner: Rahul Smiley MD  PCP: No primary care provider on file. Admission Date:   Admission Orders (From admission, onward)       Ordered        11/18/23 1012  Inpatient Admission  Once            11/17/23 1810  Place in Observation  Once                          Discharge Date: 11/30/23    Medical Problems       Resolved Problems  Date Reviewed: 11/30/2023            Resolved    Colitis 11/20/2023     Resolved by  Aminah Stevens MD    Lethargy 11/20/2023     Resolved by  Aminah Stevens MD          Consultations During Hospital Stay:  Pulmonology    Procedures Performed:   Chest tube    Significant Findings / Test Results:   XR chest portable    Result Date: 11/22/2023  Impression: No significant change in size or appearance of right pleural effusion from prior exam Workstation performed: [id number]     XR chest portable    Result Date: 11/21/2023  Impression: Small right pleural effusion and right basilar linear atelectasis. Workstation performed: [id number]     CT chest wo contrast    Result Date: 11/21/2023  Impression: Small loculated right pleural effusion, slightly decreased from 11/17/2023. Trace left pleural effusion, slightly increased from 11/17/2023. Mild dependent tree-in-bud nodularity in both upper lobes, bronchiolitis versus aspiration. Improved atelectasis in the right lung. Redemonstration of 1.4 cm low-attenuation right lower lobe nodule. As previously recommended, follow-up can be obtained with a noncontrasted abdominal CT or MRI in 12 months with biochemical evaluation to rule out functioning adenoma if not already performed.  Workstation performed: [id number]     IR IN-Patient Thoracentesis    Result Date: 11/20/2023  Impression: Ultrasound-guided right thoracentesis with drainage of 400 mL of cloudy yellow pleural fluid. Plan: Resume care by clinical team. Workstation performed: [id number]     CT chest abdomen pelvis w contrast    Result Date: 11/17/2023  Impression: Increasing loculated effusion in the right mid and lower chest. Atelectasis in the right chest has progressed to an even greater degree than pleural effusions and there is now slight rightward shift of the heart and mediastinal structures. Otherwise no significant interval change. Osteopenia with numerous healed chronic fractures reidentified. Reidentified circumscribed 14 mm right adrenal nodule. Although its imaging features are indeterminate, it does not have suspicious imaging features (heterogeneity, necrosis, irregular margins), therefore this is likely benign, and can be followed by non-contrast abdomen CT or MRI in 12 months. Please note that for adrenal nodule > 1cm,  biochemical evaluation is suggested to rule out functioning adenoma, if not already performed. Adrenal recommendation based on institutional consensus and [employer] of Radiology 2947;[id number] Workstation performed: [id number]     CT head without contrast    Result Date: 11/17/2023  Impression: No acute intracranial abnormality. Workstation performed: [id number]      Incidental Findings:   none     Test Results Pending at Discharge (will require follow up):   none     Outpatient Tests Requested:  none    Complications:  none    Reason for Admission: Pleural effusion    Hospital Course:     Avery Coyle is a 76 y.o. female patient who originally presented to the hospital on 11/17/2023 with past medical history significant rheumatoid arthritis, sarcoidosis and is present with pleural effusion. Underwent thoracentesis and chest tube placement for effusion. Clinically improved chest tube was removed.   Will have repeat CT scan outpatient. Was evaluated by physical therapy recommended rehab ultimately discharged to rehab facility  Will follow-up with primary care doctor in approximately 1 week    Please see above list of diagnoses and related plan for additional information. Condition at Discharge: stable     Discharge Day Visit / Exam:     * Please refer to separate progress note for these details *    Discussion with Family: patient,declined family upfate    Discharge instructions/Information to patient and family:   See after visit summary for information provided to patient and family. Provisions for Follow-Up Care:  See after visit summary for information related to follow-up care and any pertinent home health orders. Disposition:     Acute Rehab at     For Discharges to [facility] SNF:   Not Applicable to this Patient - Not Applicable to this Patient    Planned Readmission: none     Discharge Statement:  I spent 40 minutes discharging the patient. This time was spent on the day of discharge. I had direct contact with the patient on the day of discharge. Greater than 50% of the total time was spent examining patient, answering all patient questions, arranging and discussing plan of care with patient as well as directly providing post-discharge instructions. Additional time then spent on discharge activities. Discharge Medications:  See after visit summary for reconciled discharge medications provided to patient and family.       ** Please Note: This note has been constructed using a voice recognition system **

## 2023-12-06 NOTE — ASSESSMENT & PLAN NOTE
Incidental finding, unknown etiology  Status post thoracentesis and chest tube placement on 11/24  Chest tube has since been removed on 11/27  Currently asymptomatic  Stable for discharge from medical standpoint  Will have repeat CT in 6 to 8 weeks